# Patient Record
Sex: FEMALE | Race: WHITE | NOT HISPANIC OR LATINO | Employment: OTHER | URBAN - METROPOLITAN AREA
[De-identification: names, ages, dates, MRNs, and addresses within clinical notes are randomized per-mention and may not be internally consistent; named-entity substitution may affect disease eponyms.]

---

## 2017-01-10 ENCOUNTER — HOSPITAL ENCOUNTER (INPATIENT)
Facility: HOSPITAL | Age: 82
LOS: 3 days | Discharge: NURSING, REHAB OR LONG-TERM CARE | End: 2017-01-13
Attending: EMERGENCY MEDICINE | Admitting: INTERNAL MEDICINE

## 2017-01-10 ENCOUNTER — APPOINTMENT (OUTPATIENT)
Dept: GENERAL RADIOLOGY | Facility: HOSPITAL | Age: 82
End: 2017-01-10

## 2017-01-10 DIAGNOSIS — A41.9 SEPSIS, DUE TO UNSPECIFIED ORGANISM: ICD-10-CM

## 2017-01-10 DIAGNOSIS — N39.0 ACUTE UTI: Primary | ICD-10-CM

## 2017-01-10 PROBLEM — D72.829 LEUKOCYTOSIS: Status: ACTIVE | Noted: 2017-01-10

## 2017-01-10 PROBLEM — K21.9 GERD (GASTROESOPHAGEAL REFLUX DISEASE): Status: ACTIVE | Noted: 2017-01-10

## 2017-01-10 PROBLEM — I95.9 HYPOTENSION: Status: ACTIVE | Noted: 2017-01-10

## 2017-01-10 PROBLEM — I48.20 CHRONIC A-FIB (HCC): Status: ACTIVE | Noted: 2017-01-10

## 2017-01-10 PROBLEM — J96.00 ACUTE RESPIRATORY FAILURE (HCC): Status: ACTIVE | Noted: 2017-01-10

## 2017-01-10 LAB
ALBUMIN SERPL-MCNC: 3.3 G/DL (ref 3.2–4.8)
ALBUMIN/GLOB SERPL: 1.4 G/DL (ref 1.5–2.5)
ALP SERPL-CCNC: 151 U/L (ref 25–100)
ALT SERPL W P-5'-P-CCNC: 24 U/L (ref 7–40)
ANION GAP SERPL CALCULATED.3IONS-SCNC: 11 MMOL/L (ref 3–11)
AST SERPL-CCNC: 31 U/L (ref 0–33)
BACTERIA UR QL AUTO: ABNORMAL /HPF
BASOPHILS # BLD AUTO: 0.04 10*3/MM3 (ref 0–0.2)
BASOPHILS NFR BLD AUTO: 0.1 % (ref 0–1)
BILIRUB SERPL-MCNC: 0.4 MG/DL (ref 0.3–1.2)
BILIRUB UR QL STRIP: NEGATIVE
BUN BLD-MCNC: 26 MG/DL (ref 9–23)
BUN/CREAT SERPL: 32.5 (ref 7–25)
CALCIUM SPEC-SCNC: 8.3 MG/DL (ref 8.7–10.4)
CHLORIDE SERPL-SCNC: 98 MMOL/L (ref 99–109)
CLARITY UR: CLEAR
CO2 SERPL-SCNC: 27 MMOL/L (ref 20–31)
COLOR UR: YELLOW
CREAT BLD-MCNC: 0.8 MG/DL (ref 0.6–1.3)
D-LACTATE SERPL-SCNC: 1.3 MMOL/L (ref 0.5–2)
DEPRECATED RDW RBC AUTO: 60.4 FL (ref 37–54)
EOSINOPHIL # BLD AUTO: 0.01 10*3/MM3 (ref 0.1–0.3)
EOSINOPHIL NFR BLD AUTO: 0 % (ref 0–3)
ERYTHROCYTE [DISTWIDTH] IN BLOOD BY AUTOMATED COUNT: 20.1 % (ref 11.3–14.5)
GFR SERPL CREATININE-BSD FRML MDRD: 69 ML/MIN/1.73
GLOBULIN UR ELPH-MCNC: 2.4 GM/DL
GLUCOSE BLD-MCNC: 97 MG/DL (ref 70–100)
GLUCOSE UR STRIP-MCNC: NEGATIVE MG/DL
HCT VFR BLD AUTO: 32.1 % (ref 34.5–44)
HGB BLD-MCNC: 10.1 G/DL (ref 11.5–15.5)
HGB UR QL STRIP.AUTO: NEGATIVE
HYALINE CASTS UR QL AUTO: ABNORMAL /LPF
IMM GRANULOCYTES # BLD: 0.16 10*3/MM3 (ref 0–0.03)
IMM GRANULOCYTES NFR BLD: 0.5 % (ref 0–0.6)
INR PPP: 1.2
KETONES UR QL STRIP: NEGATIVE
LEUKOCYTE ESTERASE UR QL STRIP.AUTO: ABNORMAL
LYMPHOCYTES # BLD AUTO: 0.9 10*3/MM3 (ref 0.6–4.8)
LYMPHOCYTES NFR BLD AUTO: 3.1 % (ref 24–44)
MCH RBC QN AUTO: 25.5 PG (ref 27–31)
MCHC RBC AUTO-ENTMCNC: 31.5 G/DL (ref 32–36)
MCV RBC AUTO: 81.1 FL (ref 80–99)
MONOCYTES # BLD AUTO: 1.39 10*3/MM3 (ref 0–1)
MONOCYTES NFR BLD AUTO: 4.7 % (ref 0–12)
NEUTROPHILS # BLD AUTO: 26.82 10*3/MM3 (ref 1.5–8.3)
NEUTROPHILS NFR BLD AUTO: 91.6 % (ref 41–71)
NITRITE UR QL STRIP: NEGATIVE
PH UR STRIP.AUTO: 6 [PH] (ref 5–8)
PLATELET # BLD AUTO: 284 10*3/MM3 (ref 150–450)
PMV BLD AUTO: 10.4 FL (ref 6–12)
POTASSIUM BLD-SCNC: 4.5 MMOL/L (ref 3.5–5.5)
PROCALCITONIN SERPL-MCNC: 2.86 NG/ML
PROT SERPL-MCNC: 5.7 G/DL (ref 5.7–8.2)
PROT UR QL STRIP: NEGATIVE
PROTHROMBIN TIME: 13.2 SECONDS (ref 9.6–11.5)
RBC # BLD AUTO: 3.96 10*6/MM3 (ref 3.89–5.14)
RBC # UR: ABNORMAL /HPF
REF LAB TEST METHOD: ABNORMAL
SODIUM BLD-SCNC: 136 MMOL/L (ref 132–146)
SP GR UR STRIP: 1.01 (ref 1–1.03)
SQUAMOUS #/AREA URNS HPF: ABNORMAL /HPF
UROBILINOGEN UR QL STRIP: ABNORMAL
WBC NRBC COR # BLD: 29.32 10*3/MM3 (ref 3.5–10.8)
WBC UR QL AUTO: ABNORMAL /HPF
YEAST URNS QL MICRO: ABNORMAL /HPF

## 2017-01-10 PROCEDURE — 83880 ASSAY OF NATRIURETIC PEPTIDE: CPT | Performed by: INTERNAL MEDICINE

## 2017-01-10 PROCEDURE — 87106 FUNGI IDENTIFICATION YEAST: CPT | Performed by: EMERGENCY MEDICINE

## 2017-01-10 PROCEDURE — 99223 1ST HOSP IP/OBS HIGH 75: CPT | Performed by: INTERNAL MEDICINE

## 2017-01-10 PROCEDURE — 99285 EMERGENCY DEPT VISIT HI MDM: CPT

## 2017-01-10 PROCEDURE — 71020 HC CHEST PA AND LATERAL: CPT

## 2017-01-10 PROCEDURE — 25010000002 PIPERACILLIN SOD-TAZOBACTAM PER 1 G: Performed by: EMERGENCY MEDICINE

## 2017-01-10 PROCEDURE — 83605 ASSAY OF LACTIC ACID: CPT | Performed by: EMERGENCY MEDICINE

## 2017-01-10 PROCEDURE — 87086 URINE CULTURE/COLONY COUNT: CPT | Performed by: EMERGENCY MEDICINE

## 2017-01-10 PROCEDURE — 87040 BLOOD CULTURE FOR BACTERIA: CPT | Performed by: EMERGENCY MEDICINE

## 2017-01-10 PROCEDURE — 84145 PROCALCITONIN (PCT): CPT | Performed by: EMERGENCY MEDICINE

## 2017-01-10 PROCEDURE — 80053 COMPREHEN METABOLIC PANEL: CPT | Performed by: EMERGENCY MEDICINE

## 2017-01-10 PROCEDURE — 85025 COMPLETE CBC W/AUTO DIFF WBC: CPT | Performed by: EMERGENCY MEDICINE

## 2017-01-10 PROCEDURE — 81001 URINALYSIS AUTO W/SCOPE: CPT | Performed by: EMERGENCY MEDICINE

## 2017-01-10 PROCEDURE — 85610 PROTHROMBIN TIME: CPT | Performed by: EMERGENCY MEDICINE

## 2017-01-10 PROCEDURE — 25010000002 VANCOMYCIN HCL IN NACL 1.5-0.9 GM/500ML-% SOLUTION: Performed by: EMERGENCY MEDICINE

## 2017-01-10 RX ORDER — FLUCONAZOLE 150 MG/1
150 TABLET ORAL
Status: DISCONTINUED | OUTPATIENT
Start: 2017-01-10 | End: 2017-01-13 | Stop reason: HOSPADM

## 2017-01-10 RX ORDER — ONDANSETRON 2 MG/ML
4 INJECTION INTRAMUSCULAR; INTRAVENOUS EVERY 6 HOURS PRN
Status: DISCONTINUED | OUTPATIENT
Start: 2017-01-10 | End: 2017-01-13 | Stop reason: HOSPADM

## 2017-01-10 RX ORDER — ARGININE/ASCORBATE SOD/VITE AC 4.5 G/9.2G
1 POWDER IN PACKET (EA) ORAL 2 TIMES DAILY
COMMUNITY
End: 2017-04-05 | Stop reason: HOSPADM

## 2017-01-10 RX ORDER — WARFARIN SODIUM 5 MG/1
5 TABLET ORAL
COMMUNITY
End: 2017-01-10

## 2017-01-10 RX ORDER — HYDROCODONE BITARTRATE AND ACETAMINOPHEN 7.5; 325 MG/1; MG/1
1 TABLET ORAL EVERY 6 HOURS PRN
Status: DISCONTINUED | OUTPATIENT
Start: 2017-01-10 | End: 2017-01-13 | Stop reason: HOSPADM

## 2017-01-10 RX ORDER — SODIUM CHLORIDE 9 MG/ML
100 INJECTION, SOLUTION INTRAVENOUS CONTINUOUS
Status: DISCONTINUED | OUTPATIENT
Start: 2017-01-10 | End: 2017-01-11

## 2017-01-10 RX ORDER — CEPHALEXIN 500 MG/1
500 CAPSULE ORAL 2 TIMES DAILY
COMMUNITY
End: 2017-01-10

## 2017-01-10 RX ORDER — LEVOTHYROXINE SODIUM 0.12 MG/1
125 TABLET ORAL DAILY
Status: ON HOLD | COMMUNITY
End: 2017-03-22

## 2017-01-10 RX ORDER — VANCOMYCIN HYDROCHLORIDE 1 G/200ML
15 INJECTION, SOLUTION INTRAVENOUS EVERY 24 HOURS
Status: DISCONTINUED | OUTPATIENT
Start: 2017-01-11 | End: 2017-01-11 | Stop reason: DRUGHIGH

## 2017-01-10 RX ORDER — POLYETHYLENE GLYCOL 3350 17 G/17G
17 POWDER, FOR SOLUTION ORAL EVERY OTHER DAY
COMMUNITY

## 2017-01-10 RX ORDER — ACETAMINOPHEN 325 MG/1
650 TABLET ORAL EVERY 4 HOURS PRN
Status: DISCONTINUED | OUTPATIENT
Start: 2017-01-10 | End: 2017-01-13 | Stop reason: HOSPADM

## 2017-01-10 RX ORDER — SODIUM CHLORIDE 0.9 % (FLUSH) 0.9 %
10 SYRINGE (ML) INJECTION AS NEEDED
Status: DISCONTINUED | OUTPATIENT
Start: 2017-01-10 | End: 2017-01-13 | Stop reason: HOSPADM

## 2017-01-10 RX ORDER — VANCOMYCIN/0.9 % SOD CHLORIDE 1.5G/250ML
20 PLASTIC BAG, INJECTION (ML) INTRAVENOUS ONCE
Status: COMPLETED | OUTPATIENT
Start: 2017-01-10 | End: 2017-01-10

## 2017-01-10 RX ORDER — SODIUM CHLORIDE 9 MG/ML
125 INJECTION, SOLUTION INTRAVENOUS CONTINUOUS
Status: DISCONTINUED | OUTPATIENT
Start: 2017-01-10 | End: 2017-01-11

## 2017-01-10 RX ADMIN — FLUCONAZOLE 150 MG: 150 TABLET ORAL at 22:50

## 2017-01-10 RX ADMIN — SODIUM CHLORIDE 100 ML/HR: 9 INJECTION, SOLUTION INTRAVENOUS at 22:50

## 2017-01-10 RX ADMIN — SODIUM CHLORIDE 125 ML/HR: 9 INJECTION, SOLUTION INTRAVENOUS at 20:27

## 2017-01-10 RX ADMIN — TAZOBACTAM SODIUM AND PIPERACILLIN SODIUM 4.5 G: 500; 4 INJECTION, SOLUTION INTRAVENOUS at 19:52

## 2017-01-10 RX ADMIN — HYDROCODONE BITARTRATE AND ACETAMINOPHEN 1 TABLET: 7.5; 325 TABLET ORAL at 18:51

## 2017-01-10 RX ADMIN — SODIUM CHLORIDE 1000 ML: 9 INJECTION, SOLUTION INTRAVENOUS at 18:34

## 2017-01-10 RX ADMIN — VANCOMYCIN HYDROCHLORIDE 1500 MG: 1 INJECTION, POWDER, LYOPHILIZED, FOR SOLUTION INTRAVENOUS at 20:26

## 2017-01-10 NOTE — Clinical Note
Level of Care: Telemetry [5]   Diagnosis: Acute UTI [[id number]]   Admitting Physician: BERHANE DIGGS [[id number]]   Attending Physician: BERHANE DIGGS [[id number]]

## 2017-01-10 NOTE — IP AVS SNAPSHOT
INTER-FACILITY TRANSFER   AFTER VISIT SUMMARY             Neida Burk            Summary of Your Hospitalization        About Your Hospitalization     You were admitted on:  January 10, 2017 You last received care in the:  [address]      Reason for Hospitalization     Your primary diagnosis was:  Infection In Bloodstream    Your diagnoses also included:  Urinary Tract Infection, Low Blood Pressure, Elevated White Blood Cell Count, Atrial Fibrillation (Irregular Heartbeat), Acid Reflux Disease, Respiratory Insufficiency, Aspiration Pneumonia      Care Providers     Provider Service Role Specialty    Charlotte WILDE MD Medicine Attending Provider Hospitalist    Alban Villatoro MD Infectious Disease Consulting Physician  Infectious Diseases       Your Allergies  Date Reviewed: 1/10/2017    Allergen Reactions    Codeine Nausea And Vomiting         Lortab (Hydrocodone-Acetaminophen) Nausea And Vomiting      Pending Labs     Order Current Status    Blood Culture Preliminary result    Blood Culture Preliminary result       Medications    Based on the information you provided to us as well as any changes during this visit, the following is your updated medication list.  This is subject to change based on the care your receive at the receiving facility.  You should receive a new list once you are discharged.      If you have any questions or concerns, contact your primary care physician's office.             Your Medications      START taking these medications     amoxicillin-clavulanate 875-125 MG per tablet   Take 1 tablet by mouth Every 12 (Twelve) Hours for 4 days. Indications: Pneumonia   Commonly known as:  AUGMENTIN           fluconazole 150 MG tablet   Take 1 tablet by mouth Daily for 4 days. Indications: Infection of Blood or Tissues affecting the Whole Body, Urinary Tract Infection   Last time this was given:  1/12/2017  9:19 PM   Commonly known as:  DIFLUCAN             CONTINUE  taking these medications     ARGINAID pack   Take 1 packet by mouth 2 (Two) Times a Day.           aspirin 81 MG chewable tablet   Chew 81 mg Daily.   Last time this was given:  1/13/2017  9:23 AM           atorvastatin 20 MG tablet   Take 20 mg by mouth Every Night.   Last time this was given:  1/12/2017  9:19 PM   Commonly known as:  LIPITOR           baclofen 10 MG tablet   Take 10 mg by mouth Every Night.   Last time this was given:  1/12/2017  9:19 PM   Commonly known as:  LIORESAL           CENTRUM SILVER ULTRA WOMENS PO   Take 1 tablet by mouth Daily.   Last time this was given:  1/13/2017  9:24 AM           cholecalciferol 1000 UNITS tablet   Take 1,000 Units by mouth Daily.   Last time this was given:  1/13/2017 12:08 PM   Commonly known as:  VITAMIN D3           cyclobenzaprine 10 MG tablet   Take 10 mg by mouth 3 (Three) Times a Day As Needed for muscle spasms.   Last time this was given:  1/13/2017 12:08 PM   Commonly known as:  FLEXERIL           DULoxetine 60 MG capsule   Take 60 mg by mouth Daily.   Last time this was given:  1/13/2017 12:08 PM   Commonly known as:  CYMBALTA           folic acid 1 MG tablet   Take 1 mg by mouth Daily. Except on day take methotrexate   Last time this was given:  1/13/2017  9:25 AM   Commonly known as:  FOLVITE           furosemide 40 MG tablet   Take 40 mg by mouth Daily.   Commonly known as:  LASIX           gabapentin 600 MG tablet   Take 600 mg by mouth Every Night.   Commonly known as:  NEURONTIN           HYDROcodone-acetaminophen 7.5-325 MG per tablet   Take 1 tablet by mouth Every 6 (Six) Hours As Needed for moderate pain (4-6).   Last time this was given:  1/13/2017 10:27 AM   Commonly known as:  NORCO           hydroxychloroquine 200 MG tablet   Take 200 mg by mouth Daily.   Last time this was given:  1/13/2017 12:08 PM   Commonly known as:  PLAQUENIL           levothyroxine 125 MCG tablet   Take 125 mcg by mouth Daily.   Last time this was given:  1/13/2017   6:00 AM   Commonly known as:  SYNTHROID, LEVOTHROID           pantoprazole 40 MG EC tablet   Take 40 mg by mouth Daily.   Last time this was given:  1/13/2017  9:25 AM   Commonly known as:  PROTONIX           polyethylene glycol packet   Take 17 g by mouth Every Other Day.   Commonly known as:  MIRALAX           potassium chloride 10 MEQ CR tablet   Take 10 mEq by mouth Daily.   Commonly known as:  K-DUR           pramipexole 1 MG tablet   Take 1 mg by mouth Daily.   Last time this was given:  1/12/2017  9:19 PM   Commonly known as:  MIRAPEX                Where to Get Your Medications      Information about where to get these medications is not yet available     ! Ask your nurse or doctor about these medications     amoxicillin-clavulanate 875-125 MG per tablet    fluconazole 150 MG tablet                Additional Instructions    Information is subject to change based on the care your receive at the receiving facility.  If you have any questions or concerns, contact your primary care physician's office.          Activity Instructions     Activity as Tolerated                 Diet Instructions     Advance Diet As Tolerated                  Follow-ups for After Discharge        Follow-up Information     Follow up with Sergio Garcia MD Follow up on 1/23/2017.    Specialty:  Internal Medicine    Why:  Follow up with Dr. Garcia 1/23/17 at 11:30am.     Contact information:    [address] [place] [id number] [phone]        Referrals and Follow-ups to Schedule     Follow-Up    As directed    PCP in 1 week             Information on Stroke     You have been diagnosed with stroke during your hospitalization. Review the following to reduce your risk of stroke:  Discharge Medications  Taking your medications as prescribed is one of the most vital aspects of reducing your risk for stroke. It is important to know the names of your medications, how they work, how much to take, and when to take them. You  should take your medications at the same time every day. Do not stop your prescribed medications or begin taking over-the-counter or herbal medications without first speaking with your physician.  Risk Factors for Stroke  High blood pressure - High blood pressure is the most important risk factor for stroke. People who have high blood pressure have more than half the lifetime risk of having stroke compared to those who consistently have an optimal blood pressure reading of 120/80.  Tobacco Use - Tobacco use doubles the risk for another stroke. Stop smoking if you smoke  High cholesterol - Cholesterol or plaque build-up in the arteries can block normal blood flow to the brain and cause a stroke and increase risk of heart disease. Maintain healthy cholesterol levels  Diabetes - People with diabetes are up to 4 times as likely to have a stroke as someone who does not have the disease.   Atrial fibrillation - Atrial fibrillation increases your stroke risk 5 times, so it’s important to work with a doctor to control it. Eat a healthy diet -- maintaining a diet low in calories, saturated and trans fats and cholesterol helps manage both obesity and healthy cholesterol levels in the blood, which also reduces risk for stroke.   Physical activity - Physical activity reduces stroke risk. A recent study showed that people who exercise five or more times per week are less likely to have another stroke. Increase your physical activity.  Alcohol use - Some studies say that drinking more than 2 drinks per day may increase stroke risk by 50 percent. Other studies have indicated that one alcoholic beverage a day may lower a person’s risk for stroke, provided that there is no other medical reason for avoiding alcohol. Talk with a doctor about alcohol use and how it can best be controlled to prevent another stroke.    Warning Signs of Stroke  Use FAST to remember warning signs of stroke:  Face - Ask the person to smile. Does one side of  the face droop?  Arms - Ask the person to raise both arms. Does one arm drift downward?  Speech - Ask the person to repeat a simple phrase. Is their speech slurred or strange?  Time - If you observe any of these signs, call 9-1-1 immediately.  Symptoms of Stroke  Sudden numbness or weakness of face, arm or leg - especially on one side of the body.   Sudden confusion, trouble speaking or understanding.   Sudden trouble seeing in one or both eyes.   Sudden trouble walking, dizziness, loss of balance or coordination.   Sudden severe headache with no known cause.                     Patient Signature:  ____________________________________________________________    Date:  ____________________________________________________________        More Information      Urinary Tract Infection  Urinary tract infections (UTIs) can develop anywhere along your urinary tract. Your urinary tract is your body's drainage system for removing wastes and extra water. Your urinary tract includes two kidneys, two ureters, a bladder, and a urethra. Your kidneys are a pair of bean-shaped organs. Each kidney is about the size of your fist. They are located below your ribs, one on each side of your spine.  CAUSES  Infections are caused by microbes, which are microscopic organisms, including fungi, viruses, and bacteria. These organisms are so small that they can only be seen through a microscope. Bacteria are the microbes that most commonly cause UTIs.  SYMPTOMS   Symptoms of UTIs may vary by age and gender of the patient and by the location of the infection. Symptoms in young women typically include a frequent and intense urge to urinate and a painful, burning feeling in the bladder or urethra during urination. Older women and men are more likely to be tired, shaky, and weak and have muscle aches and abdominal pain. A fever may mean the infection is in your kidneys. Other symptoms of a kidney infection include pain in your back or sides below  the ribs, nausea, and vomiting.  DIAGNOSIS  To diagnose a UTI, your caregiver will ask you about your symptoms. Your caregiver will also ask you to provide a urine sample. The urine sample will be tested for bacteria and white blood cells. White blood cells are made by your body to help fight infection.  TREATMENT   Typically, UTIs can be treated with medication. Because most UTIs are caused by a bacterial infection, they usually can be treated with the use of antibiotics. The choice of antibiotic and length of treatment depend on your symptoms and the type of bacteria causing your infection.  HOME CARE INSTRUCTIONS  · If you were prescribed antibiotics, take them exactly as your caregiver instructs you. Finish the medication even if you feel better after you have only taken some of the medication.  · Drink enough water and fluids to keep your urine clear or pale yellow.  · Avoid caffeine, tea, and carbonated beverages. They tend to irritate your bladder.  · Empty your bladder often. Avoid holding urine for long periods of time.  · Empty your bladder before and after sexual intercourse.  · After a bowel movement, women should cleanse from front to back. Use each tissue only once.  SEEK MEDICAL CARE IF:   · You have back pain.  · You develop a fever.  · Your symptoms do not begin to resolve within 3 days.  SEEK IMMEDIATE MEDICAL CARE IF:   · You have severe back pain or lower abdominal pain.  · You develop chills.  · You have nausea or vomiting.  · You have continued burning or discomfort with urination.  MAKE SURE YOU:   · Understand these instructions.  · Will watch your condition.  · Will get help right away if you are not doing well or get worse.     This information is not intended to replace advice given to you by your health care provider. Make sure you discuss any questions you have with your health care provider.     Document Released: 09/27/2006 Document Revised: 09/07/2016 Document Reviewed:  01/25/2013  [employer] Patient Education ©2016 [employer]          Amoxicillin; Clavulanic Acid tablets  What is this medicine?  AMOXICILLIN; CLAVULANIC ACID (a mox i LAURENCE in; TRINHCLARE moriah misty mccabe AS id) is a penicillin antibiotic. It is used to treat certain kinds of bacterial infections. It will not work for colds, flu, or other viral infections.  This medicine may be used for other purposes; ask your health care provider or pharmacist if you have questions.  What should I tell my health care provider before I take this medicine?  They need to know if you have any of these conditions:  -bowel disease, like colitis  -kidney disease  -liver disease  -mononucleosis  -an unusual or allergic reaction to amoxicillin, penicillin, cephalosporin, other antibiotics, clavulanic acid, other medicines, foods, dyes, or preservatives  -pregnant or trying to get pregnant  -breast-feeding  How should I use this medicine?  Take this medicine by mouth with a full glass of water. Follow the directions on the prescription label. Take at the start of a meal. Do not crush or chew. If the tablet has a score line, you may cut it in half at the score line for easier swallowing. Take your medicine at regular intervals. Do not take your medicine more often than directed. Take all of your medicine as directed even if you think you are better. Do not skip doses or stop your medicine early.  Talk to your pediatrician regarding the use of this medicine in children. Special care may be needed.  Overdosage: If you think you have taken too much of this medicine contact a poison control center or emergency room at once.  NOTE: This medicine is only for you. Do not share this medicine with others.  What if I miss a dose?  If you miss a dose, take it as soon as you can. If it is almost time for your next dose, take only that dose. Do not take double or extra doses.  What may interact with this medicine?  -allopurinol  -anticoagulants  -birth  control pills  -methotrexate  -probenecid  This list may not describe all possible interactions. Give your health care provider a list of all the medicines, herbs, non-prescription drugs, or dietary supplements you use. Also tell them if you smoke, drink alcohol, or use illegal drugs. Some items may interact with your medicine.  What should I watch for while using this medicine?  Tell your doctor or health care professional if your symptoms do not improve.  Do not treat diarrhea with over the counter products. Contact your doctor if you have diarrhea that lasts more than 2 days or if it is severe and watery.  If you have diabetes, you may get a false-positive result for sugar in your urine. Check with your doctor or health care professional.  Birth control pills may not work properly while you are taking this medicine. Talk to your doctor about using an extra method of birth control.  What side effects may I notice from receiving this medicine?  Side effects that you should report to your doctor or health care professional as soon as possible:  -allergic reactions like skin rash, itching or hives, swelling of the face, lips, or tongue  -breathing problems  -dark urine  -fever or chills, sore throat  -redness, blistering, peeling or loosening of the skin, including inside the mouth  -seizures  -trouble passing urine or change in the amount of urine  -unusual bleeding, bruising  -unusually weak or tired  -white patches or sores in the mouth or throat  Side effects that usually do not require medical attention (report to your doctor or health care professional if they continue or are bothersome):  -diarrhea  -dizziness  -headache  -nausea, vomiting  -stomach upset  -vaginal or anal irritation  This list may not describe all possible side effects. Call your doctor for medical advice about side effects. You may report side effects to FDA at 3-507-FDA-5478.  Where should I keep my medicine?  Keep out of the reach of  children.  Store at room temperature below 25 degrees C (77 degrees F). Keep container tightly closed. Throw away any unused medicine after the expiration date.  NOTE: This sheet is a summary. It may not cover all possible information. If you have questions about this medicine, talk to your doctor, pharmacist, or health care provider.     © 2016, [employer]. (2009-03-12 12:04:30)          Fluconazole tablets  What is this medicine?  FLUCONAZOLE (floo HEMA na zole) is an antifungal medicine. It is used to treat certain kinds of fungal or yeast infections.  This medicine may be used for other purposes; ask your health care provider or pharmacist if you have questions.  What should I tell my health care provider before I take this medicine?  They need to know if you have any of these conditions:  -electrolyte abnormalities  -history of irregular heart beat  -kidney disease  -an unusual or allergic reaction to fluconazole, other azole antifungals, medicines, foods, dyes, or preservatives  -pregnant or trying to get pregnant  -breast-feeding  How should I use this medicine?  Take this medicine by mouth. Follow the directions on the prescription label. Do not take your medicine more often than directed.  Talk to your pediatrician regarding the use of this medicine in children. Special care may be needed. This medicine has been used in children as young as 6 months of age.  Overdosage: If you think you have taken too much of this medicine contact a poison control center or emergency room at once.  NOTE: This medicine is only for you. Do not share this medicine with others.  What if I miss a dose?  If you miss a dose, take it as soon as you can. If it is almost time for your next dose, take only that dose. Do not take double or extra doses.  What may interact with this medicine?  Do not take this medicine with any of the following medications:  -astemizole  -certain medicines for irregular heart beat like  dofetilide, dronedarone, quinidine  -cisapride  -erythromycin  -lomitapide  -other medicines that prolong the QT interval (cause an abnormal heart rhythm)  -pimozide  -terfenadine  -thioridazine  -tolvaptan  -ziprasidone  This medicine may also interact with the following medications:  -antiviral medicines for HIV or AIDS  -birth control pills  -certain antibiotics like rifabutin, rifampin  -certain medicines for blood pressure like amlodipine, isradipine, felodipine, hydrochlorothiazide, losartan, nifedipine  -certain medicines for cancer like cyclophosphamide, vinblastine, vincristine  -certain medicines for cholesterol like atorvastatin, lovastatin, fluvastatin, simvastatin  -certain medicines for depression, anxiety, or psychotic disturbances like amitriptyline, midazolam, nortriptyline, triazolam  -certain medicines for diabetes like glipizide, glyburide, tolbutamide  -certain medicines for pain like alfentanil, fentanyl, methadone  -certain medicines for seizures like carbamazepine, phenytoin  -certain medicines that treat or prevent blood clots like warfarin  -halofantrine  -medicines that lower your chance of fighting infection like cyclosporine, prednisone, tacrolimus  -NSAIDS, medicines for pain and inflammation, like celecoxib, diclofenac, flurbiprofen, ibuprofen, meloxicam, naproxen  -other medicines for fungal infections  -sirolimus  -theophylline  -tofacitinib  This list may not describe all possible interactions. Give your health care provider a list of all the medicines, herbs, non-prescription drugs, or dietary supplements you use. Also tell them if you smoke, drink alcohol, or use illegal drugs. Some items may interact with your medicine.  What should I watch for while using this medicine?  Visit your doctor or health care professional for regular checkups. If you are taking this medicine for a long time you may need blood work. Tell your doctor if your symptoms do not improve. Some fungal  infections need many weeks or months of treatment to cure.  Alcohol can increase possible damage to your liver. Avoid alcoholic drinks.  If you have a vaginal infection, do not have sex until you have finished your treatment. You can wear a sanitary napkin. Do not use tampons. Wear freshly washed cotton, not synthetic, panties.  What side effects may I notice from receiving this medicine?  Side effects that you should report to your doctor or health care professional as soon as possible:  -allergic reactions like skin rash or itching, hives, swelling of the lips, mouth, tongue, or throat  -dark urine  -feeling dizzy or faint  -irregular heartbeat or chest pain  -redness, blistering, peeling or loosening of the skin, including inside the mouth  -trouble breathing  -unusual bruising or bleeding  -vomiting  -yellowing of the eyes or skin  Side effects that usually do not require medical attention (report to your doctor or health care professional if they continue or are bothersome):  -changes in how food tastes  -diarrhea  -headache  -stomach upset or nausea  This list may not describe all possible side effects. Call your doctor for medical advice about side effects. You may report side effects to FDA at 8-033-FDA-0388.  Where should I keep my medicine?  Keep out of the reach of children.  Store at room temperature below 30 degrees C (86 degrees F). Throw away any medicine after the expiration date.  NOTE: This sheet is a summary. It may not cover all possible information. If you have questions about this medicine, talk to your doctor, pharmacist, or health care provider.     © 2016, [employer]. (2014-07-26 16:13:04)

## 2017-01-10 NOTE — ED PROVIDER NOTES
Subjective   HPI Comments: Jonathan Burk is a 83 y.o.female who presents to the ED with c/o hypotension. She was sent to the ED today by her nursing home. She has no physical complaints but by report has hypotension, leukocytosis, and fever. Upon ED arrival, she states she had cold symptoms this morning which have resolved but denies SOA, CP, abdominal pain, or any other complaints at this time. She notes she has chronic neck pain secondary to arthritis.    Patient is a 83 y.o. female presenting with general illness.   History provided by:  Patient and nursing home  Illness   Location:  General  Quality:  Hypotension  Severity:  Moderate  Onset quality:  Sudden  Timing:  Constant  Progression:  Unchanged  Chronicity:  New  Context:  She was sent to the ED today by her nursing home. She has no physical complaints but by report has hypotension, leukocytosis, and fever. Upon ED arrival, she states she had cold symptoms this morning which have resolved but denies SOA, CP, abdominal pain, or any other complaints at this time.   Relieved by:  None tried  Worsened by:  Nothing  Ineffective treatments:  None tried  Associated symptoms: fever    Associated symptoms: no abdominal pain, no chest pain, no cough, no diarrhea, no headaches, no loss of consciousness, no nausea, no shortness of breath, no sore throat and no vomiting        Review of Systems   Constitutional: Positive for fever. Negative for chills.        Hypotension and leukocytosis.    HENT: Positive for sneezing. Negative for sore throat.         Cold symptoms.    Respiratory: Negative for cough and shortness of breath.    Cardiovascular: Negative for chest pain.   Gastrointestinal: Negative for abdominal pain, diarrhea, nausea and vomiting.   Musculoskeletal: Negative for back pain and neck pain.   Neurological: Negative for dizziness, loss of consciousness, weakness, light-headedness and headaches.   All other systems reviewed and are negative.      Past  Medical History   Diagnosis Date   • Back pain    • Chronic a-fib    • CVA (cerebral vascular accident)      about 2-3 years ago, residual is slight gait problems   • Gastrointestinal hemorrhage    • GERD (gastroesophageal reflux disease)    • Guillain Barré syndrome    • Hypothyroid    • Malignant melanoma    • Melanoma    • MRSA (methicillin resistant Staphylococcus aureus)    • Ulcer of esophagus        Allergies   Allergen Reactions   • Codeine Nausea And Vomiting   • Lortab [Hydrocodone-Acetaminophen] Nausea And Vomiting       Past Surgical History   Procedure Laterality Date   • Hip arthroplasty Right      Had complications with bone fx and anisha, 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 and wears shoes with elevated soles   • Back surgery       x3    • Elbow procedure     • Knee surgery     • Skin cancer excision     • Endoscopy N/A 12/3/2016     Procedure: ESOPHAGOGASTRODUODENOSCOPY AT BEDSIDE;  Surgeon: Chandler aMher MD;  Location: [facility] ENDOSCOPY;  Service:        Family History   Problem Relation Age of Onset   • Aneurysm Mother    • Stroke Mother    • No Known Problems Father        Social History     Social History   • Marital status:      Spouse name: N/A   • Number of children: N/A   • Years of education: N/A     Social History Main Topics   • Smoking status: Former Smoker   • Smokeless tobacco: Never Used   • Alcohol use 0.6 oz/week     1 Glasses of wine per week   • Drug use: No   • Sexual activity: Defer     Other Topics Concern   • None     Social History Narrative   • None         Objective   Physical Exam   Constitutional: She is oriented to person, place, and time. She appears well-developed and well-nourished. No distress.   HENT:   Head: Normocephalic and atraumatic.   Eyes: Conjunctivae are normal. No scleral icterus.   Neck: Normal range of motion. Neck supple.   Cardiovascular: Normal rate, regular rhythm and normal heart sounds.    Pulmonary/Chest: Effort normal and breath  sounds normal. No respiratory distress.   Abdominal: Soft. Bowel sounds are normal. There is no tenderness.   Musculoskeletal: Normal range of motion. She exhibits no edema.   Neurological: She is alert and oriented to person, place, and time.   Skin: Skin is warm and dry.   Psychiatric: She has a normal mood and affect. Her behavior is normal.   Nursing note and vitals reviewed.      Procedures         ED Course  ED Course   Hypotensive in ED, responded well to IV fluids.  Empiric abx for sepsis ordered.  Urine results were delayed, finally showed UTI. CXR negative, unsure why she was hypoxic.  Patient stable on serial rechecks.  Discussed exam findings, test results so far and concerns in detail at the bedside.  Discussed need for admission for further evaluation and treatment.                    MDM  Number of Diagnoses or Management Options  Acute UTI:   Sepsis, due to unspecified organism:      Amount and/or Complexity of Data Reviewed  Clinical lab tests: ordered and reviewed  Tests in the radiology section of CPT®: ordered and reviewed  Decide to obtain previous medical records or to obtain history from someone other than the patient: yes  Discuss the patient with other providers: yes  Independent visualization of images, tracings, or specimens: yes        Final diagnoses:   Acute UTI   Sepsis, due to unspecified organism       Documentation assistance provided by blade Stevens.  Information recorded by the scribe was done at my direction and has been verified and validated by me.     Irma Stevens  01/10/17 1720       Francisco Lockwood MD  01/11/17 4425

## 2017-01-10 NOTE — IP AVS SNAPSHOT
LUIS VARGAS   Facility: [facility] ([place])   SA: [facility]    MRN:  [id number]   PT#:     Report:  [id number] - Summary of Care Document           Basic Information     Date Of Birth Sex Race Ethnicity Preferred Language Preferred Written Language    11/23/1933 Female White or  Not  or  English English      Allergies as of 1/13/2017  Reviewed On: 1/10/2017 By: Kristi Chau RN       Noted Reaction Type Reactions    Codeine 10/19/2016    Nausea And Vomiting    Lortab [Hydrocodone-acetaminophen] 10/19/2016    Nausea And Vomiting      Current Medications Are     amoxicillin-clavulanate (AUGMENTIN) 875-125 MG per tablet Take 1 tablet by mouth Every 12 (Twelve) Hours for 4 days. Indications: Pneumonia    aspirin 81 MG chewable tablet Chew 81 mg Daily.    atorvastatin (LIPITOR) 20 MG tablet Take 20 mg by mouth Every Night.    baclofen (LIORESAL) 10 MG tablet Take 10 mg by mouth Every Night.    cholecalciferol (VITAMIN D3) 1000 UNITS tablet Take 1,000 Units by mouth Daily.    cyclobenzaprine (FLEXERIL) 10 MG tablet Take 10 mg by mouth 3 (Three) Times a Day As Needed for muscle spasms.    DULoxetine (CYMBALTA) 60 MG capsule Take 60 mg by mouth Daily.    fluconazole (DIFLUCAN) 150 MG tablet Take 1 tablet by mouth Daily for 4 days. Indications: Infection of Blood or Tissues affecting the Whole Body, Urinary Tract Infection    folic acid (FOLVITE) 1 MG tablet Take 1 mg by mouth Daily. Except on day take methotrexate    furosemide (LASIX) 40 MG tablet Take 40 mg by mouth Daily.    gabapentin (NEURONTIN) 600 MG tablet Take 600 mg by mouth Every Night.    HYDROcodone-acetaminophen (NORCO) 7.5-325 MG per tablet Take 1 tablet by mouth Every 6 (Six) Hours As Needed for moderate pain (4-6).    hydroxychloroquine (PLAQUENIL) 200 MG tablet Take 200 mg by mouth Daily.    levothyroxine (SYNTHROID, LEVOTHROID) 125 MCG tablet Take 125 mcg by mouth Daily.    Multiple  Vitamins-Minerals (CENTRUM SILVER ULTRA WOMENS PO) Take 1 tablet by mouth Daily.    Nutritional Supplements (ARGINAID) pack Take 1 packet by mouth 2 (Two) Times a Day.    pantoprazole (PROTONIX) 40 MG EC tablet Take 40 mg by mouth Daily.    polyethylene glycol (MIRALAX) packet Take 17 g by mouth Every Other Day.    potassium chloride (K-DUR) 10 MEQ CR tablet Take 10 mEq by mouth Daily.    pramipexole (MIRAPEX) 1 MG tablet Take 1 mg by mouth Daily.      Current Immunizations     No immunizations on file.      Problem List as of 1/13/2017  Date Reviewed: 1/13/2017             Codes Priority Class Noted - Resolved    RESOLVED: Upper GI hemorrhage ICD-10-CM: K92.2  ICD-9-CM: 578.9   12/2/2016 - 1/10/2017            RESOLVED: Elevated liver enzymes due to shock liver ICD-10-CM: R74.8  ICD-9-CM: 790.5   12/2/2016 - 1/10/2017    CVA (cerebral vascular accident) ICD-10-CM: I63.9  ICD-9-CM: 434.91   12/2/2016 - Present    Overview Signed 12/2/2016  6:54 PM by VANNA Du     Takes coumadin at home         RESOLVED: Supratherapeutic INR ICD-10-CM: R79.1  ICD-9-CM: 790.92   12/2/2016 - 1/10/2017    RESOLVED: Altered mental state ICD-10-CM: R41.82  ICD-9-CM: 780.97   12/2/2016 - 1/10/2017    Recent Elbow Infection ICD-10-CM: S50.319A, L08.9  ICD-9-CM: 913.1   12/2/2016 - Present    Overview Signed 12/2/2016  7:06 PM by Anne Parker, APRN     Was just taken off doxycycline         RESOLVED: Acute blood loss anemia ICD-10-CM: D62  ICD-9-CM: 285.1   12/2/2016 - 1/10/2017    RESOLVED: Gastrointestinal hemorrhage with melena ICD-10-CM: K92.1  ICD-9-CM: 578.1   12/2/2016 - 1/10/2017    RESOLVED: Lactic acidosis ICD-10-CM: E87.2  ICD-9-CM: 276.2   12/2/2016 - 1/10/2017    RESOLVED: Acute kidney injury ICD-10-CM: N17.9  ICD-9-CM: 584.9   12/2/2016 - 1/10/2017    Ulcerative esophagitis ICD-10-CM: K22.10  ICD-9-CM: 530.19   12/6/2016 - Present    RESOLVED: Hemorrhagic Shock ICD-10-CM: R57.9  ICD-9-CM: 785.50    12/6/2016 - 1/10/2017    RESOLVED: Thrombophlebitis arm, bilateral ICD-10-CM: I80.8  ICD-9-CM: 451.84   12/6/2016 - 1/10/2017    RESOLVED: Troponin I above reference range, likely due to shock ICD-10-CM: R79.89  ICD-9-CM: 790.6   12/6/2016 - 1/10/2017    RESOLVED: Bilateral neck pain ICD-10-CM: M54.2  ICD-9-CM: 723.1   12/6/2016 - 1/10/2017    UTI (urinary tract infection) ICD-10-CM: N39.0  ICD-9-CM: 599.0   1/10/2017 - Present    * (Principal)Sepsis ICD-10-CM: A41.9  ICD-9-CM: 038.9, 995.91   1/10/2017 - Present    Hypotension ICD-10-CM: I95.9  ICD-9-CM: 458.9   1/10/2017 - Present    Leukocytosis ICD-10-CM: D72.829  ICD-9-CM: 288.60   1/10/2017 - Present    Chronic a-fib ICD-10-CM: I48.2  ICD-9-CM: 427.31   1/10/2017 - Present    GERD (gastroesophageal reflux disease) ICD-10-CM: K21.9  ICD-9-CM: 530.81   1/10/2017 - Present    Acute respiratory failure with hypoxia ICD-10-CM: J96.01  ICD-9-CM: 518.81   1/10/2017 - Present    Aspiration pneumonia ICD-10-CM: J69.0  ICD-9-CM: 507.0   1/13/2017 - Present      Diagnoses        Codes Comments    Acute UTI    -  Primary ICD-10-CM: N39.0  ICD-9-CM: 599.0     Sepsis, due to unspecified organism     ICD-10-CM: A41.9  ICD-9-CM: 038.9, 995.91       Lab and Imaging Results     Procedure Component Value Units Date/Time    Urine Culture [[id number]]  (Abnormal) Collected:  01/10/17 1900    Specimen:  Urine from Urine, Catheter Updated:  01/13/17 1324     Urine Culture 70,000-80,000 CFU/mL Candida tropicalis (A)     CBC (No Diff) [[id number]]  (Abnormal) Collected:  01/13/17 0700    Specimen:  Blood Updated:  01/13/17 0752     WBC 7.93 10*3/mm3      RBC 4.01 10*6/mm3      Hemoglobin 9.8 (L) g/dL      Hematocrit 33.1 (L) %      MCV 82.5 fL      MCH 24.4 (L) pg      MCHC 29.6 (L) g/dL      RDW 19.6 (H) %      RDW-SD 60.5 (H) fl      MPV 10.4 fL      Platelets 311 10*3/mm3     Vancomycin, Trough [[id number]]  (Abnormal) Collected:  01/12/17 2028    Specimen:  Blood Updated:  01/12/17 2102      Vancomycin Trough 8.70 (L) mcg/mL     Blood Culture [[id number]]  (Normal) Collected:  01/10/17 1803    Specimen:  Blood from Hand, Right Updated:  01/12/17 1901     Blood Culture No growth at 2 days     Blood Culture [[id number]]  (Normal) Collected:  01/10/17 1804    Specimen:  Blood from Arm, Left Updated:  01/12/17 1901     Blood Culture No growth at 2 days     CT Chest Without Contrast [[id number]] Collected:  01/12/17 1256     Updated:  01/12/17 1447    Narrative:       EXAMINATION: CT CHEST WITHOUT CONTRAST-01/11/2017:      INDICATION: Fever, cough, hypoxia; possible aspiration;; N39.0-Urinary  tract infection, site not specified; A41.9-Sepsis, unspecified organism.          TECHNIQUE:  CT data set of the chest and mediastinum was performed  without intravenous contrast.     The radiation dose reduction device was turned on for each scan per the  ALARA (As Low as Reasonably Achievable) protocol.     COMPARISON: NONE     FINDINGS:   1. Reactive-type lymph nodes are seen in the superior and mid  mediastinum. The cardiac chambers are borderline enlarged without  pericardial effusion. There is a large hiatus hernia with partial  intrathoracic stomach in the lower chest.  2. There are reactive lymph nodes in the axillary areas bilaterally  without bulky mass. Thoracic inlet is otherwise unremarkable. The images  into the upper abdomen are nonrevealing.  3. Extended window data sets demonstrate obstructive lung disease and  patchy areas of substantial fibrotic opacities. Linear nodular  coalescent opacities with mild groundglass are seen in the superior  segment right lower lobe and in the basilar segments bilaterally with  secondary pulmonary fibrosis and substantial fibrotic involvement of the  peripheral secondary pulmonary lobular septations. Trace effusions are  seen at both lung bases and there is bronchiectasis.       Impression:       1.  Cardiomegaly without pericardial effusion.  2.  Marked background  pattern of obstructive lung disease, centrilobular  emphysema and pulmonary fibrosis with involvement of the secondary  pulmonary lobular septations.  3.  Ill defined nodular groundglass opacity posterior segment right  upper lobe and superior segment right lower lobe with mild consolidative  opacities at both bases and trace effusions.  4.  Marked esophagomegaly is noted with fluid in the esophagus and there  is a large hiatus hernia in the lower chest with partial intrathoracic  stomach.     D:  01/12/2017  E:  01/12/2017           This report was finalized on 1/12/2017 2:45 PM by Dr. Chi Talley MD.       Basic Metabolic Panel [[id number]]  (Abnormal) Collected:  01/12/17 0704    Specimen:  Blood Updated:  01/12/17 0808     Glucose 72 mg/dL      BUN 18 mg/dL      Creatinine 0.70 mg/dL      Sodium 138 mmol/L      Potassium 3.8 mmol/L      Chloride 105 mmol/L      CO2 26.0 mmol/L      Calcium 7.9 (L) mg/dL      eGFR Non African Amer 80 mL/min/1.73      BUN/Creatinine Ratio 25.7 (H)      Anion Gap 7.0 mmol/L     Narrative:       National Kid[employer] Guidelines    Stage                           Description                             GFR                      1                               Normal or High                          90+  2                               Mild decrease                            60-89  3                               Moderate decrease                   30-59  4                               Severe decrease                       15-29  5                               Kidney failure                             <15    CBC & Differential [[id number]] Collected:  01/12/17 0704    Specimen:  Blood Updated:  01/12/17 0800    Narrative:       The following orders were created for panel order CBC & Differential.  Procedure                               Abnormality         Status                     ---------                               -----------         ------                     CBC Auto  Differential[[id number]]         Abnormal            Final result                 Please view results for these tests on the individual orders.    CBC Auto Differential [[id number]]  (Abnormal) Collected:  01/12/17 0704    Specimen:  Blood Updated:  01/12/17 0800     WBC 10.79 10*3/mm3      RBC 3.89 10*6/mm3      Hemoglobin 9.7 (L) g/dL      Hematocrit 32.0 (L) %      MCV 82.3 fL      MCH 24.9 (L) pg      MCHC 30.3 (L) g/dL      RDW 20.0 (H) %      RDW-SD 60.7 (H) fl      MPV 9.8 fL      Platelets 271 10*3/mm3      Neutrophil % 80.4 (H) %      Lymphocyte % 7.0 (L) %      Monocyte % 6.0 %      Eosinophil % 6.0 (H) %      Basophil % 0.2 %      Immature Grans % 0.4 %      Neutrophils, Absolute 8.67 (H) 10*3/mm3      Lymphocytes, Absolute 0.76 10*3/mm3      Monocytes, Absolute 0.65 10*3/mm3      Eosinophils, Absolute 0.65 (H) 10*3/mm3      Basophils, Absolute 0.02 10*3/mm3      Immature Grans, Absolute 0.04 (H) 10*3/mm3     Influenza A & B, RT PCR [[id number]]  (Normal) Collected:  01/11/17 2038    Specimen:  Swab from Nasopharynx Updated:  01/11/17 2219     Influenza A PCR Not Detected      Influenza B PCR Not Detected     XR Chest 2 View [[id number]] Collected:  01/11/17 0933     Updated:  01/11/17 1254    Narrative:       EXAMINATION: XR CHEST 2 VIEWS-01/10/2017:      INDICATION: Hypoxia, fever.      COMPARISON: NONE     FINDINGS:   1.  Cardiomegaly is noted. There is COPD and diffuse interstitial  fibrosis with granulomatous scarring.  2.  Superimposed upon these chronic senile changes, there is no evidence  of consolidation, edema or free fluid.  3.  There is marked dorsal arthropathy and there is severe right  shoulder arthropathy.           Impression:       1.  Cardiomegaly is noted with mild central vascular prominence.  Background pattern of significant interstitial and granulomatous  scarring is noted.  2.  Active disease, consolidation, free fluid or edema is otherwise not  identified.     D:  01/10/2017  E:   01/11/2017     This report was finalized on 1/11/2017 12:52 PM by Dr. Chi Talley MD.       Basic Metabolic Panel [[id number]]  (Abnormal) Collected:  01/11/17 0558    Specimen:  Blood Updated:  01/11/17 0848     Glucose 73 mg/dL      BUN 21 mg/dL      Creatinine 0.70 mg/dL      Sodium 136 mmol/L      Potassium 4.0 mmol/L      Chloride 105 mmol/L      CO2 22.0 mmol/L      Calcium 7.6 (L) mg/dL      eGFR Non African Amer 80 mL/min/1.73      BUN/Creatinine Ratio 30.0 (H)      Anion Gap 9.0 mmol/L     Narrative:       National Kidney Foundation Guidelines    Stage                           Description                             GFR                      1                               Normal or High                          90+  2                               Mild decrease                            60-89  3                               Moderate decrease                   30-59  4                               Severe decrease                       15-29  5                               Kidney failure                             <15    CBC & Differential [[id number]] Collected:  01/11/17 0558    Specimen:  Blood Updated:  01/11/17 0753    Narrative:       The following orders were created for panel order CBC & Differential.  Procedure                               Abnormality         Status                     ---------                               -----------         ------                     CBC Auto Differential[[id number]]         Abnormal            Final result                 Please view results for these tests on the individual orders.    CBC Auto Differential [[id number]]  (Abnormal) Collected:  01/11/17 0558    Specimen:  Blood Updated:  01/11/17 0753     WBC 15.03 (H) 10*3/mm3      RBC 3.76 (L) 10*6/mm3      Hemoglobin 9.2 (L) g/dL      Hematocrit 31.2 (L) %      MCV 83.0 fL      MCH 24.5 (L) pg      MCHC 29.5 (L) g/dL      RDW 20.0 (H) %      RDW-SD 61.8 (H) fl      MPV 10.4 fL      Platelets 248 10*3/mm3       Neutrophil % 85.4 (H) %      Lymphocyte % 6.1 (L) %      Monocyte % 5.8 %      Eosinophil % 2.1 %      Basophil % 0.3 %      Immature Grans % 0.3 %      Neutrophils, Absolute 12.85 (H) 10*3/mm3      Lymphocytes, Absolute 0.92 10*3/mm3      Monocytes, Absolute 0.87 10*3/mm3      Eosinophils, Absolute 0.31 (H) 10*3/mm3      Basophils, Absolute 0.04 10*3/mm3      Immature Grans, Absolute 0.04 (H) 10*3/mm3     BNP [[id number]]  (Abnormal) Collected:  01/10/17 1803    Specimen:  Blood Updated:  01/11/17 0124     BNP 1018.0 (H) pg/mL     Protime-INR [[id number]]  (Abnormal) Collected:  01/10/17 1946    Specimen:  Blood Updated:  01/10/17 2040     Protime 13.2 (H) Seconds      INR 1.20     Narrative:       Therapeutic Ranges for INR: 2.0-3.0 (PT 20-30)                              2.5-3.5 (PT 25-34)    Urinalysis With / Culture If Indicated [[id number]]  (Abnormal) Collected:  01/10/17 1900    Specimen:  Urine from Urine, Catheter Updated:  01/10/17 1958     Color, UA Yellow      Appearance, UA Clear      pH, UA 6.0      Specific Gravity, UA 1.009      Glucose, UA Negative      Ketones, UA Negative      Bilirubin, UA Negative      Blood, UA Negative      Protein, UA Negative      Leuk Esterase, UA Moderate (2+) (A)      Nitrite, UA Negative      Urobilinogen, UA 0.2 E.U./dL     Urinalysis, Microscopic Only [[id number]]  (Abnormal) Collected:  01/10/17 1900    Specimen:  Urine from Urine, Catheter Updated:  01/10/17 1958     RBC, UA 0-2 /HPF      WBC, UA 21-30 (A) /HPF      Bacteria, UA None Seen /HPF      Squamous Epithelial Cells, UA 3-6 (A) /HPF      Yeast, UA Moderate/2+ Budding Yeast /HPF      Hyaline Casts, UA 0-6 /LPF      Methodology Manual Light Microscopy     Comprehensive Metabolic Panel [[id number]]  (Abnormal) Collected:  01/10/17 1803    Specimen:  Blood Updated:  01/10/17 1911     Glucose 97 mg/dL      BUN 26 (H) mg/dL      Creatinine 0.80 mg/dL      Sodium 136 mmol/L      Potassium 4.5 mmol/L      Chloride 98 (L)  mmol/L      CO2 27.0 mmol/L      Calcium 8.3 (L) mg/dL      Total Protein 5.7 g/dL      Albumin 3.30 g/dL      ALT (SGPT) 24 U/L      AST (SGOT) 31 U/L      Alkaline Phosphatase 151 (H) U/L      Total Bilirubin 0.4 mg/dL      eGFR Non African Amer 69 mL/min/1.73      Globulin 2.4 gm/dL      A/G Ratio 1.4 (L) g/dL      BUN/Creatinine Ratio 32.5 (H)      Anion Gap 11.0 mmol/L     Narrative:       National Kidney Foundation Guidelines    Stage                           Description                             GFR                      1                               Normal or High                          90+  2                               Mild decrease                            60-89  3                               Moderate decrease                   30-59  4                               Severe decrease                       15-29  5                               Kidney failure                             <15    Lactic Acid, Plasma [[id number]]  (Normal) Collected:  01/10/17 1803    Specimen:  Blood Updated:  01/10/17 1857     Lactate 1.3 mmol/L       Falsely depressed results may occur on samples drawn from patients receiving N-Acetylcysteine (NAC) or Metamizole.       Procalcitonin [[id number]] Collected:  01/10/17 1803    Specimen:  Blood Updated:  01/10/17 1854     Procalcitonin 2.86 ng/mL     Narrative:       As a Marker for Sepsis (Non-Neonates):   1. <0.5 ng/mL represents a low risk of severe sepsis and/or septic shock.  2. >2 ng/mL represents a high risk of severe sepsis and/or septic shock.    As a Marker for Lower Respiratory Tract Infections that require antibiotic therapy:    PCT on Admission     Antibiotic Therapy       6-12 Hrs later  > 0.5                Strongly Recommended             >0.25 - <0.5         Recommended  0.1 - 0.25           Discouraged              Remeasure/reassess PCT  <0.1                 Strongly Discouraged     Remeasure/reassess PCT                     PCT values of < 0.5 ng/mL do  "not exclude an infection, because localized infections (without systemic signs) may be associated with such low concentrations, or a systemic infection in its initial stages (< 6 hours). Furthermore, increased PCT can occur without infection. PCT concentrations between 0.5 and 2.0 ng/mL should be interpreted taking into account the patient's history. It is recommended to retest PCT within 6-24 hours if any concentrations < 2 ng/mL are obtained.    CBC & Differential [[id number]] Collected:  01/10/17 1803    Specimen:  Blood Updated:  01/10/17 1849    Narrative:       The following orders were created for panel order CBC & Differential.  Procedure                               Abnormality         Status                     ---------                               -----------         ------                     CBC Auto Differential[[id number]]         Abnormal            Final result                 Please view results for these tests on the individual orders.    CBC Auto Differential [[id number]]  (Abnormal) Collected:  01/10/17 1803    Specimen:  Blood Updated:  01/10/17 1849     WBC 29.32 (H) 10*3/mm3      RBC 3.96 10*6/mm3      Hemoglobin 10.1 (L) g/dL      Hematocrit 32.1 (L) %      MCV 81.1 fL      MCH 25.5 (L) pg      MCHC 31.5 (L) g/dL      RDW 20.1 (H) %      RDW-SD 60.4 (H) fl      MPV 10.4 fL      Platelets 284 10*3/mm3      Neutrophil % 91.6 (H) %      Lymphocyte % 3.1 (L) %      Monocyte % 4.7 %      Eosinophil % 0.0 %      Basophil % 0.1 %      Immature Grans % 0.5 %      Neutrophils, Absolute 26.82 (H) 10*3/mm3      Lymphocytes, Absolute 0.90 10*3/mm3      Monocytes, Absolute 1.39 (H) 10*3/mm3      Eosinophils, Absolute 0.01 (L) 10*3/mm3      Basophils, Absolute 0.04 10*3/mm3      Immature Grans, Absolute 0.16 (H) 10*3/mm3       Vitals     BP Pulse Temp Resp Ht Wt    135/72 (BP Location: Right arm, Patient Position: Lying) 67 97.8 °F (36.6 °C) (Oral) 16 59\" (149.9 cm) 115 lb 11.9 oz (52.5 kg)    SpO2 BMI       "       (!) 87% 23.38 kg/m2       Vitals History      Social History     Category History    Smoking Tobacco Use Former Smoker; Start date:     Smokeless Tobacco Use Never Used    Tobacco Comment     Alcohol Use Yes; 0.6 oz alcohol/wk; 1 Glasses of wine per week    Drug Use No    Sexual Activity Defer    ADL Not Asked      Patient Care Team        Relationship Specialty Notifications Start End    Sergio Garcia MD PCP - General Internal Medicine  9/18/16        Instructions for After Discharge        Follow-up Information     Follow up with Sergio Garcia MD Follow up on 1/23/2017.    Specialty:  Internal Medicine    Why:  Follow up with Dr. Garcia 1/23/17 at 11:30am.     Contact information:    [facility] [address] [phone]        Activity Instructions     Activity as Tolerated                 Diet Instructions     Advance Diet As Tolerated                 Referrals and Follow-ups to Schedule     Follow-Up    As directed    PCP in 1 week

## 2017-01-11 ENCOUNTER — APPOINTMENT (OUTPATIENT)
Dept: CT IMAGING | Facility: HOSPITAL | Age: 82
End: 2017-01-11

## 2017-01-11 PROBLEM — J96.01 ACUTE RESPIRATORY FAILURE WITH HYPOXIA (HCC): Status: ACTIVE | Noted: 2017-01-10

## 2017-01-11 LAB
ANION GAP SERPL CALCULATED.3IONS-SCNC: 9 MMOL/L (ref 3–11)
BASOPHILS # BLD AUTO: 0.04 10*3/MM3 (ref 0–0.2)
BASOPHILS NFR BLD AUTO: 0.3 % (ref 0–1)
BNP SERPL-MCNC: 1018 PG/ML (ref 0–100)
BUN BLD-MCNC: 21 MG/DL (ref 9–23)
BUN/CREAT SERPL: 30 (ref 7–25)
CALCIUM SPEC-SCNC: 7.6 MG/DL (ref 8.7–10.4)
CHLORIDE SERPL-SCNC: 105 MMOL/L (ref 99–109)
CO2 SERPL-SCNC: 22 MMOL/L (ref 20–31)
CREAT BLD-MCNC: 0.7 MG/DL (ref 0.6–1.3)
DEPRECATED RDW RBC AUTO: 61.8 FL (ref 37–54)
EOSINOPHIL # BLD AUTO: 0.31 10*3/MM3 (ref 0.1–0.3)
EOSINOPHIL NFR BLD AUTO: 2.1 % (ref 0–3)
ERYTHROCYTE [DISTWIDTH] IN BLOOD BY AUTOMATED COUNT: 20 % (ref 11.3–14.5)
FLUAV SUBTYP SPEC NAA+PROBE: NOT DETECTED
FLUBV RNA ISLT QL NAA+PROBE: NOT DETECTED
GFR SERPL CREATININE-BSD FRML MDRD: 80 ML/MIN/1.73
GLUCOSE BLD-MCNC: 73 MG/DL (ref 70–100)
HCT VFR BLD AUTO: 31.2 % (ref 34.5–44)
HGB BLD-MCNC: 9.2 G/DL (ref 11.5–15.5)
IMM GRANULOCYTES # BLD: 0.04 10*3/MM3 (ref 0–0.03)
IMM GRANULOCYTES NFR BLD: 0.3 % (ref 0–0.6)
LYMPHOCYTES # BLD AUTO: 0.92 10*3/MM3 (ref 0.6–4.8)
LYMPHOCYTES NFR BLD AUTO: 6.1 % (ref 24–44)
MCH RBC QN AUTO: 24.5 PG (ref 27–31)
MCHC RBC AUTO-ENTMCNC: 29.5 G/DL (ref 32–36)
MCV RBC AUTO: 83 FL (ref 80–99)
MONOCYTES # BLD AUTO: 0.87 10*3/MM3 (ref 0–1)
MONOCYTES NFR BLD AUTO: 5.8 % (ref 0–12)
NEUTROPHILS # BLD AUTO: 12.85 10*3/MM3 (ref 1.5–8.3)
NEUTROPHILS NFR BLD AUTO: 85.4 % (ref 41–71)
PLATELET # BLD AUTO: 248 10*3/MM3 (ref 150–450)
PMV BLD AUTO: 10.4 FL (ref 6–12)
POTASSIUM BLD-SCNC: 4 MMOL/L (ref 3.5–5.5)
RBC # BLD AUTO: 3.76 10*6/MM3 (ref 3.89–5.14)
SODIUM BLD-SCNC: 136 MMOL/L (ref 132–146)
WBC NRBC COR # BLD: 15.03 10*3/MM3 (ref 3.5–10.8)

## 2017-01-11 PROCEDURE — 87502 INFLUENZA DNA AMP PROBE: CPT | Performed by: NURSE PRACTITIONER

## 2017-01-11 PROCEDURE — 80048 BASIC METABOLIC PNL TOTAL CA: CPT | Performed by: NURSE PRACTITIONER

## 2017-01-11 PROCEDURE — 25010000002 ENOXAPARIN PER 10 MG

## 2017-01-11 PROCEDURE — 99233 SBSQ HOSP IP/OBS HIGH 50: CPT | Performed by: HOSPITALIST

## 2017-01-11 PROCEDURE — 92610 EVALUATE SWALLOWING FUNCTION: CPT

## 2017-01-11 PROCEDURE — 85025 COMPLETE CBC W/AUTO DIFF WBC: CPT | Performed by: INTERNAL MEDICINE

## 2017-01-11 PROCEDURE — 25010000002 VANCOMYCIN PER 500 MG

## 2017-01-11 PROCEDURE — 71250 CT THORAX DX C-: CPT

## 2017-01-11 PROCEDURE — 25010000002 PIPERACILLIN SOD-TAZOBACTAM PER 1 G

## 2017-01-11 RX ORDER — ATORVASTATIN CALCIUM 20 MG/1
20 TABLET, FILM COATED ORAL NIGHTLY
Status: DISCONTINUED | OUTPATIENT
Start: 2017-01-11 | End: 2017-01-13 | Stop reason: HOSPADM

## 2017-01-11 RX ORDER — LEVOTHYROXINE SODIUM 0.12 MG/1
125 TABLET ORAL DAILY
Status: DISCONTINUED | OUTPATIENT
Start: 2017-01-11 | End: 2017-01-13 | Stop reason: HOSPADM

## 2017-01-11 RX ORDER — FOLIC ACID 1 MG/1
1 TABLET ORAL DAILY
Status: DISCONTINUED | OUTPATIENT
Start: 2017-01-11 | End: 2017-01-13 | Stop reason: HOSPADM

## 2017-01-11 RX ORDER — CYCLOBENZAPRINE HCL 10 MG
10 TABLET ORAL ONCE
Status: COMPLETED | OUTPATIENT
Start: 2017-01-11 | End: 2017-01-11

## 2017-01-11 RX ORDER — CYCLOBENZAPRINE HCL 10 MG
10 TABLET ORAL 3 TIMES DAILY
Status: DISCONTINUED | OUTPATIENT
Start: 2017-01-11 | End: 2017-01-11

## 2017-01-11 RX ORDER — MULTIPLE VITAMINS W/ MINERALS TAB 9MG-400MCG
1 TAB ORAL DAILY
Status: DISCONTINUED | OUTPATIENT
Start: 2017-01-11 | End: 2017-01-13 | Stop reason: HOSPADM

## 2017-01-11 RX ORDER — ASPIRIN 81 MG/1
81 TABLET, CHEWABLE ORAL DAILY
Status: DISCONTINUED | OUTPATIENT
Start: 2017-01-11 | End: 2017-01-13 | Stop reason: HOSPADM

## 2017-01-11 RX ORDER — DULOXETIN HYDROCHLORIDE 60 MG/1
60 CAPSULE, DELAYED RELEASE ORAL DAILY
Status: DISCONTINUED | OUTPATIENT
Start: 2017-01-11 | End: 2017-01-13 | Stop reason: HOSPADM

## 2017-01-11 RX ORDER — PRAMIPEXOLE DIHYDROCHLORIDE 1 MG/1
1 TABLET ORAL NIGHTLY
Status: DISCONTINUED | OUTPATIENT
Start: 2017-01-11 | End: 2017-01-13 | Stop reason: HOSPADM

## 2017-01-11 RX ADMIN — ENOXAPARIN SODIUM 30 MG: 30 INJECTION SUBCUTANEOUS at 04:57

## 2017-01-11 RX ADMIN — CYCLOBENZAPRINE HYDROCHLORIDE 10 MG: 10 TABLET, FILM COATED ORAL at 23:46

## 2017-01-11 RX ADMIN — PRAMIPEXOLE DIHYDROCHLORIDE 1 MG: 1 TABLET ORAL at 01:55

## 2017-01-11 RX ADMIN — TAZOBACTAM SODIUM AND PIPERACILLIN SODIUM 4.5 G: 500; 4 INJECTION, SOLUTION INTRAVENOUS at 20:28

## 2017-01-11 RX ADMIN — HYDROCODONE BITARTRATE AND ACETAMINOPHEN 1 TABLET: 7.5; 325 TABLET ORAL at 17:53

## 2017-01-11 RX ADMIN — FLUCONAZOLE 150 MG: 150 TABLET ORAL at 20:29

## 2017-01-11 RX ADMIN — MULTIPLE VITAMINS W/ MINERALS TAB 1 TABLET: TAB ORAL at 09:31

## 2017-01-11 RX ADMIN — HYDROCODONE BITARTRATE AND ACETAMINOPHEN 1 TABLET: 7.5; 325 TABLET ORAL at 01:54

## 2017-01-11 RX ADMIN — PRAMIPEXOLE DIHYDROCHLORIDE 1 MG: 1 TABLET ORAL at 20:30

## 2017-01-11 RX ADMIN — TAZOBACTAM SODIUM AND PIPERACILLIN SODIUM 4.5 G: 500; 4 INJECTION, SOLUTION INTRAVENOUS at 01:54

## 2017-01-11 RX ADMIN — ASPIRIN 81 MG 81 MG: 81 TABLET ORAL at 09:30

## 2017-01-11 RX ADMIN — LEVOTHYROXINE SODIUM 125 MCG: 125 TABLET ORAL at 04:58

## 2017-01-11 RX ADMIN — ATORVASTATIN CALCIUM 20 MG: 20 TABLET, FILM COATED ORAL at 20:29

## 2017-01-11 RX ADMIN — TAZOBACTAM SODIUM AND PIPERACILLIN SODIUM 4.5 G: 500; 4 INJECTION, SOLUTION INTRAVENOUS at 15:35

## 2017-01-11 RX ADMIN — TAZOBACTAM SODIUM AND PIPERACILLIN SODIUM 4.5 G: 500; 4 INJECTION, SOLUTION INTRAVENOUS at 09:30

## 2017-01-11 RX ADMIN — FOLIC ACID 1 MG: 1 TABLET ORAL at 09:30

## 2017-01-11 RX ADMIN — DULOXETINE 60 MG: 60 CAPSULE, DELAYED RELEASE ORAL at 09:31

## 2017-01-11 RX ADMIN — ACETAMINOPHEN 650 MG: 325 TABLET, FILM COATED ORAL at 15:37

## 2017-01-11 RX ADMIN — VANCOMYCIN HYDROCHLORIDE 750 MG: 750 INJECTION, SOLUTION INTRAVENOUS at 20:34

## 2017-01-11 RX ADMIN — HYDROCODONE BITARTRATE AND ACETAMINOPHEN 1 TABLET: 7.5; 325 TABLET ORAL at 11:23

## 2017-01-11 NOTE — PLAN OF CARE
Problem: Patient Care Overview (Adult)  Goal: Plan of Care Review  Outcome: Ongoing (interventions implemented as appropriate)    01/11/17 0934   Coping/Psychosocial Response Interventions   Plan Of Care Reviewed With patient   Outcome Evaluation   Outcome Summary/Follow up Plan Clinical swallow eval complete. Functional swallow. Pt reports reflux w/ recent increase. No overt s/s of asp even when pushed w/ thins. REC: regular and thins, meds whole in thins, reflux procautions, no therpay indicatd.

## 2017-01-11 NOTE — PROGRESS NOTES
"      HOSPITALIST DAILY PROGRESS NOTE    Chief Complaint: f/u hypotension    Subjective   SUBJECTIVE/OVERNIGHT EVENTS   Patient seen this morning. She feels better. Eating breakfast. Says she has been dealing with a cold recently, has been coughing up some clear sputum. No other complaints today.    Review of Systems:  Gen-no fevers, no chills  CV-no chest pain, no palpitations  Resp-+cough, no dyspnea  GI-no N/V/D, no abd pain    Objective   OBJECTIVE   I have reviewed the vital signs.  Visit Vitals   • /70 (BP Location: Right arm, Patient Position: Lying)   • Pulse 59   • Temp 97.4 °F (36.3 °C) (Oral)   • Resp 18   • Ht 59\" (149.9 cm)   • Wt 115 lb 11.9 oz (52.5 kg)   • SpO2 93%   • BMI 23.38 kg/m2       Physical Exam:  Gen-no acute distress  CV-RRR, S1 S2 normal, no m/r/g  Resp-CTAB, no wheezes  Abd-soft, NT, ND, +BS  Ext-no edema  Neuro-A&Ox3, no focal deficits  Psych-appropriate mood    Results:  I have reviewed the labs, culture data, radiology results, and diagnostic studies.      Results from last 7 days  Lab Units 01/11/17  0558 01/10/17  1803   WBC 10*3/mm3 15.03* 29.32*   HEMOGLOBIN g/dL 9.2* 10.1*   HEMATOCRIT % 31.2* 32.1*   PLATELETS 10*3/mm3 248 284       Results from last 7 days  Lab Units 01/11/17  0558   SODIUM mmol/L 136   POTASSIUM mmol/L 4.0   CHLORIDE mmol/L 105   TOTAL CO2 mmol/L 22.0   BUN mg/dL 21   CREATININE mg/dL 0.70   GLUCOSE mg/dL 73   CALCIUM mg/dL 7.6*       Culture Data:  Cultures:    BLOOD CULTURE   Date Value Ref Range Status   01/10/2017 No growth at less than 24 hours  Preliminary   01/10/2017 No growth at less than 24 hours  Preliminary     URINE CULTURE   Date Value Ref Range Status   01/10/2017 No growth at 24 hours  Preliminary         Radiology Results:  Imaging Results (last 24 hours)     Procedure Component Value Units Date/Time    XR Chest 2 View [[id number]] Collected:  01/11/17 0933     Updated:  01/11/17 1254    Narrative:       EXAMINATION: XR CHEST 2 " VIEWS-01/10/2017:      INDICATION: Hypoxia, fever.      COMPARISON: NONE     FINDINGS:   1.  Cardiomegaly is noted. There is COPD and diffuse interstitial  fibrosis with granulomatous scarring.  2.  Superimposed upon these chronic senile changes, there is no evidence  of consolidation, edema or free fluid.  3.  There is marked dorsal arthropathy and there is severe right  shoulder arthropathy.           Impression:       1.  Cardiomegaly is noted with mild central vascular prominence.  Background pattern of significant interstitial and granulomatous  scarring is noted.  2.  Active disease, consolidation, free fluid or edema is otherwise not  identified.     D:  01/10/2017  E:  01/11/2017     This report was finalized on 1/11/2017 12:52 PM by Dr. Chi Talley MD.             I have reviewed the medications.      Assessment/Plan   ASSESSMENT/PLAN    Principal Problem:    Sepsis  Active Problems:    Acute UTI    Hypotension    Leukocytosis    Acute respiratory failure with hypoxia    Chronic a-fib    GERD (gastroesophageal reflux disease)    84 yo F presents from the [facility] due to low blood pressure and temp of 100.4. Found to have leukocytosis of 29,000.    PLAN:  --UA not terribly impressive. Will await urine culture along with blood cultures.   --CXR not so revealing, CT chest ordered and pending for further evaluation for source of sepsis. NH reported choking with food: SLP evaluation complete, no therapy indicated.   --Improving on Vanc/Zosyn.   --ID to see as she has a history of right elbow septic joint treated by Dr. Villatoro in Sept 2016.     Complex patient.    More than 50% of time spent counseling on current illness and plan of care. Case discussed with: patient  Total time of the encounter was 35 minutes.    I expect patient to be discharged in: 1-2 days    Charlotte Peace MD  01/11/17  1:17 PM

## 2017-01-11 NOTE — CONSULTS
Pharmacokinetic Consult - Antimicrobial Dosing    Pharmacy consulted to manage piperacillin/tazobactam and vancomycin therapies for this 82 yo female with sepsis of questionable etiology, PNA v. urosepsis . PMH significant for recent admission for acute blood loss anemia and septic v. hypovolemic shock for which she received the same antimicrobial therapies; history of MRSA right elbow septic arthritis. Her renal function is much better than previous visit when she had an YOGESH and was maintained on vancomycin 750 mg IV q24h.     Indication: Sepsis - urosepsis v. PNA  Consulting Provider: Dr. Arie Barr  Vancomycin target trough: 15-20 mcg/mL    Objective  Lab Results   Component Value Date    CREATININE 0.80 01/10/2017     Estimated Creatinine Clearance: 47.4 mL/min (by C-G formula based on Cr of 0.8).  I/O last 3 completed shifts:  In: 200 [I.V.:200]  Out: -   Wt Readings from Last 3 Encounters:   01/10/17 160 lb (72.6 kg)   12/09/16 152 lb 1.6 oz (69 kg)   10/19/16 130 lb (59 kg)     Lab Results   Component Value Date    WBC 29.32 (H) 01/10/2017     ANTI-INFECTIVE I-VENT REVIEW    Patient: Neida Burk  MRN#: [id number]  Attending: No name on file.  Admission Date: 011017    Current Antimicrobial Medications  IV Anti-Infectives       Ordered     Dose/Rate Route Frequency Start Stop      01/10/17 2155  vancomycin (VANCOCIN) IVPB 1 g (premix) in Dextrose 5% 200 mL     Ordering Provider:  Singh Champion IV, RPH    15 mg/kg × 72.6 kg  over 60 Minutes Intravenous Every 24 Hours 01/11/17 2100      01/10/17 2155  piperacillin-tazobactam (ZOSYN) 4.5 g in dextrose 100 mL IVPB (premix)     Ordering Provider:  Singh Champion IV, RPH    4.5 g  over 0.5 Hours Intravenous Every 6 Hours 01/11/17 0200      01/10/17 2148  fluconazole (DIFLUCAN) tablet 150 mg     Ordering Provider:  VANNA Connell    150 mg Oral Every 24 Hours Scheduled 01/10/17 2230      01/10/17 2148  Pharmacy to dose vancomycin     Ordering  Provider:  VANNA Suarez     Does not apply Continuous PRN 01/10/17 2148      01/10/17 1919  vancomycin IVPB 1500 mg in 0.9% NaCl (Premix) 500 mL     Ordering Provider:  Francisco Lockwood MD    20 mg/kg × 72.6 kg Intravenous Once 01/10/17 1921 01/10/17 2026    01/10/17 1919  piperacillin-tazobactam (ZOSYN) 4.5 g in dextrose 100 mL IVPB (premix)     Ordering Provider:  Francisco Lockwood MD    4.5 g Intravenous Once 01/10/17 1921 01/10/17 2026            Assessment/Plan    Ms. Burk received 4.5 gm of piperacillin/tazobactam and 1500 mg of IV vancomycin in the ED ~2030 this evening.     Start 4.5 gm IV piperacillin/tazobactam for septic PNA at 0200 tomorrow - if CAP rather than HCAP, reduce to 3.375 gm IV q6h.    Vancomycin 1000 mg (15 mg/kg) q24h starting tomorrow at 2100. Assess early vancomycin trough at 2030 on Thursday 1/12/17. Target trough 15-20 mcg/mL.    Thank you for this consult.  Singh Champion IV, PharmD, BCPS  1/10/2017  9:55 PM

## 2017-01-11 NOTE — PLAN OF CARE
Problem: Skin Integrity Impairment, Risk/Actual (Adult)  Goal: Identify Related Risk Factors and Signs and Symptoms  Outcome: Ongoing (interventions implemented as appropriate)    01/11/17 0449   Skin Integrity Impairment, Risk/Actual   Skin Integrity Impairment, Risk/Actual: Related Risk Factors fluid/nutrition status;sensory impairment   Signs and Symptoms (Skin Integrity Impairment) erythema nonblanchable         Problem: Infection, Risk/Actual (Adult)  Goal: Identify Related Risk Factors and Signs and Symptoms  Outcome: Ongoing (interventions implemented as appropriate)    01/11/17 0449   Infection, Risk/Actual   Infection, Risk/Actual: Related Risk Factors skin integrity impairment   Signs and Symptoms (Infection, Risk/Actual) lab value changes

## 2017-01-11 NOTE — THERAPY DISCHARGE NOTE
Acute Care - Speech Language Pathology   Swallow Eval/Discharge [facility]   Clinical Swallow Evaluation       Patient Name: Neida Burk  : 1933  MRN: [id number]  Today's Date: 2017               Admit Date: 1/10/2017    Visit Dx:    ICD-10-CM ICD-9-CM   1. Acute UTI N39.0 599.0   2. Sepsis, due to unspecified organism A41.9 038.9     995.91     Patient Active Problem List   Diagnosis   • CVA (cerebral vascular accident)   • Recent Elbow Infection   • Ulcerative esophagitis   • Acute UTI   • Sepsis   • Hypotension   • Leukocytosis   • Chronic a-fib   • GERD (gastroesophageal reflux disease)   • Acute respiratory failure     Past Medical History   Diagnosis Date   • Back pain    • Chronic a-fib    • CVA (cerebral vascular accident)      about 2-3 years ago, residual is slight gait problems   • Gastrointestinal hemorrhage    • GERD (gastroesophageal reflux disease)    • Guillain Barré syndrome    • Hypothyroid    • Malignant melanoma    • Melanoma    • MRSA (methicillin resistant Staphylococcus aureus)    • Ulcer of esophagus      Past Surgical History   Procedure Laterality Date   • Hip arthroplasty Right      Had complications with bone fx and anisha, 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 and wears shoes with elevated soles   • Back surgery       x3    • Elbow procedure     • Knee surgery     • Skin cancer excision     • Endoscopy N/A 12/3/2016     Procedure: ESOPHAGOGASTRODUODENOSCOPY AT BEDSIDE;  Surgeon: Chandler Maher MD;  Location: [facility] ENDOSCOPY;  Service:           SWALLOW EVALUATION (last 72 hours)      Swallow Evaluation       17 0845                Rehab Evaluation    Document Type evaluation  -LS        Subjective Information no complaints;agree to therapy  -LS        General Information    Patient Profile Review yes  -LS        Subjective Patient Observations alert and oriented  -LS        Pertinent History Of Current Problem admit w/ UTI, ?PNA vs. Broncitis   -LS         Current Diet Limitations thin liquids;regular solid  -        Plans/Goals Discussed With patient;agreed upon  -LS        Barriers to Rehab none identified  -LS        Clinical Impression    Patient's Goals For Discharge patient did not state  -        SLP Swallowing Diagnosis --   WFL  -LS        Rehab Potential/Prognosis, Swallowing good, to achieve stated therapy goals  -        Criteria for Skilled Therapeutic Interventions Met no problems identified which require skilled intervention  -        SLP Diet Recommendation regular textures;thin liquids  -        SLP Rec. for Method of Medication Administration meds whole with thin liquid  -LS        Pain Assessment    Pain Assessment No/denies pain  -LS        Oral Motor Structure and Function    Oral Motor Anatomy and Physiology patient demonstrates anatomy and physiology that is WNL  -LS        Dentition Assessment present and adequate  -LS        Secretion Management WNL/WFL  -LS        Mucosal Quality moist, healthy  -LS        Volitional Swallow no difficulties initiating volitional swallow  -LS        Volitional Cough no difficulties initiating volitional cough  -LS        General Swallow Observations    General Swallow Observations WFL  -        Clinical Swallow Exam    Oral Phase Results intact oral phase without signs of dysfunction  -LS        Pharyngeal Phase Results no signs/symptoms of pharyngeal impairment  -LS        Summary of Clinical Exam Functional swallow. Pt reports reflux w/ recent increase. No overt s/s of asp even when pushed w/ thins. REC: regular and thins, meds whole in thins, reflux procautions, no therpay indicatd.   -          User Key  (r) = Recorded By, (t) = Taken By, (c) = Cosigned By    Initials Name Effective Dates     Odessa Siddiqui MS [facility]-SLP 06/22/15 -         EDUCATION  The patient has been educated in the following areas:   Dysphagia (Swallowing Impairment) Oral Care/Hydration.    SLP Recommendation and Plan      SLP Diet Recommendation: regular textures, thin liquids     SLP Rec. for Method of Medication Administration: meds whole with thin liquid        Criteria for Skilled Therapeutic Interventions Met: no problems identified which require skilled intervention     Rehab Potential/Prognosis, Swallowing: good, to achieve stated therapy goals                Plan of Care Review  Plan Of Care Reviewed With: patient  Outcome Summary/Follow up Plan: Clinical swallow eval complete. Functional swallow. Pt reports reflux w/ recent increase. No overt s/s of asp even when pushed w/ thins. REC: regular and thins, meds whole in thins, reflux procautions, no therpay indicatd.              Time Calculation:         Time Calculation- SLP       01/11/17 0934          Time Calculation- SLP    SLP Start Time 0845  -      SLP Received On 01/11/17  -        User Key  (r) = Recorded By, (t) = Taken By, (c) = Cosigned By    Initials Name Provider Type     Odessa Siddiqui MS CCC-SLP Speech and Language Pathologist          Therapy Charges for Today     Code Description Service Date Service Provider Modifiers Qty    [id number]  ST EVAL ORAL PHARYNG SWALLOW 4 1/11/2017 Odessa Siddiqui, MS CCC-SLP GN 1                    Odessa Siddiqui MS CCC-SLP  1/11/2017

## 2017-01-11 NOTE — PROGRESS NOTES
"Adult Nutrition  Assessment/PES    Patient Name:  Neida Burk  YOB: 1933  MRN: [id number]  Admit Date:  1/10/2017    Assessment Date:  1/11/2017        Reason for Assessment       01/11/17 1148    Reason for Assessment    Reason For Assessment/Visit identified at risk by screening criteria    Identified At Risk By Screening Criteria MST SCORE 2+    Time Spent (min) 20    Diagnosis Diagnosis    Cardiac PAF;Hypotension    Endocrine Hypothyroid    Gastrointestinal GERD/Reflux   Pt has history of GI bleed    Infectious Disease [place]';MRSA;Sepsis;UTI    Neurological CVA    Oncology --   Pt has history of malignant melanoma    Skin --   Pt has history of MRSA    Other diagnosis --              Nutrition/Diet History       01/11/17 1158    Nutrition/Diet History    Reported/Observed By Patient    Other Pt stated appetite declined when she began taking antibiotic meds in Nov; appetite has improved since then, reported eating more than half of breakfast tray            Anthropometrics       01/11/17 1200    Anthropometrics    Height 149.9 cm (59\")    Weight 52.5 kg (115 lb 11.9 oz)    Ideal Body Weight (IBW)    Ideal Body Weight (IBW), Female 43.97    % Ideal Body Weight 119.64    Body Mass Index (BMI)    BMI (kg/m2) 23.43            Labs/Tests/Procedures/Meds       01/11/17 1200    Labs/Tests/Procedures/Meds    Labs/Tests Review Reviewed    Procedure Review SLP   Per SLP REC - Regular, Thins    Swallow eval status Done    Type of SLP Evaluation Bedside                Nutrition Prescription Ordered       01/11/17 1200    Nutrition Prescription PO    Current PO Diet Regular    Fluid Consistency Thin            Evaluation of Received Nutrient/Fluid Intake       01/11/17 1200    PO Evaluation    Number of Meals 1    % PO Intake 75              Problem/Interventions:        Problem 1       01/11/17 1201    Nutrition Diagnoses Problem 1    Problem 1 Nutrition Appropriate for Condition at this Time    " Etiology (related to) Medical Diagnosis   Clinical condition    Signs/Symptoms (evidenced by) PO Intake    Percent (%) intake recorded 75 %    Over number of meals 1                    Intervention Goal       01/11/17 1202    Intervention Goal    General Nutrition support treatment    PO Maintain intake            Nutrition Intervention       01/11/17 1202    Nutrition Intervention    RD/Tech Action Encourage intake;Follow Tx progress              Education/Evaluation       01/11/17 1202    Monitor/Evaluation    Monitor Per protocol;PO intake        Comments:      Electronically signed by:  Felicia Ortega  01/11/17 2:50 PM

## 2017-01-11 NOTE — PROGRESS NOTES
"Adult Nutrition  Assessment/PES    Patient Name:  Neida Burk  YOB: 1933  MRN: [id number]  Admit Date:  1/10/2017    Assessment Date:  1/11/2017        Reason for Assessment       01/11/17 1148    Reason for Assessment    Reason For Assessment/Visit identified at risk by screening criteria    Identified At Risk By Screening Criteria MST SCORE 2+    Time Spent (min) 20    Diagnosis Diagnosis    Cardiac PAF;Hypotension    Endocrine Hypothyroid    Gastrointestinal GERD/Reflux   Pt has history of GI bleed    Infectious Disease [place]';MRSA;Sepsis;UTI    Neurological CVA    Oncology --   Pt has history of malignant melanoma    Skin --   Pt has history of MRSA    Other diagnosis --              Nutrition/Diet History       01/11/17 1158    Nutrition/Diet History    Reported/Observed By Patient    Other Pt stated appetite declined when she began taking antibiotic meds in Nov; appetite has improved since then, reported eating more than half of breakfast tray            Anthropometrics       01/11/17 1200    Anthropometrics    Height 149.9 cm (59\")    Weight 52.5 kg (115 lb 11.9 oz)    Ideal Body Weight (IBW)    Ideal Body Weight (IBW), Female 43.97    % Ideal Body Weight 119.64    Body Mass Index (BMI)    BMI (kg/m2) 23.43            Labs/Tests/Procedures/Meds       01/11/17 1200    Labs/Tests/Procedures/Meds    Labs/Tests Review Reviewed                Nutrition Prescription Ordered       01/11/17 1200    Nutrition Prescription PO    Current PO Diet Regular    Fluid Consistency Thin            Evaluation of Received Nutrient/Fluid Intake       01/11/17 1200    PO Evaluation    Number of Meals 1    % PO Intake 75              Problem/Interventions:        Problem 1       01/11/17 1201    Nutrition Diagnoses Problem 1    Problem 1 Nutrition Appropriate for Condition at this Time    Etiology (related to) Medical Diagnosis   Clinical condition    Signs/Symptoms (evidenced by) PO Intake    Percent " (%) intake recorded 75 %    Over number of meals 1                    Intervention Goal       01/11/17 1202    Intervention Goal    General Nutrition support treatment    PO Maintain intake            Nutrition Intervention       01/11/17 1202    Nutrition Intervention    RD/Tech Action Encourage intake;Follow Tx progress              Education/Evaluation       01/11/17 1202    Monitor/Evaluation    Monitor Per protocol;PO intake        Comments:      Electronically signed by:  Felicai Ortega  01/11/17 2:29 PM

## 2017-01-11 NOTE — H&P
"  [facility] Medicine Services  HISTORY AND PHYSICAL    Primary Care Physician: Sergio Garcia MD    Subjective     Chief Complaint- hypotension     History of Present Illness   84 yo female presented to the ed w/ c/o a low blood pressure. Pt is currently at [facility] for rehabilitation. Pt's blood pressure noted to be low, pt brought to the ed for evaluation. She reports that she has felt ok with only a mildly productive cough that started this morning. Reports that her sputum is clear and that her cough improved this afternoon. She has had a \"head cold\" and some congestion for 4-5 days with improving symptoms. She was told that she had a fever this morning but does not remember the actual temperature. Has felt mildly short of breath today. Does not require supplemental oxygen at home. She denies feeling feverish or chilled. Denies N/V/D, abdominal pain. Denies chest pain, weakness. Does report small laceration to her right lower extremity that she has been dealing with for the past month, but denies drainage, redness or erythema of the surrounding tissue. Denies dysuria, urgency/frequency, but does report that her urine output has been decreased from baseline for the last 24-48 hours. Reports a healthy appetite and good PO intake. Pt admitted to the hospital medicine service for further evaluation.     Admitted in December of 2016 secondary to a GI bleed from esophagitis/esophageal ulcer. Had been on coumadin for chronic afib, per nursing home papers she stopped taking the coumadin 1/2/17. Also admitted in September for a septic right elbow- denies any edema, pain or erythema of any joints at this time.    Review of Systems   Constitutional: Negative for appetite change, chills, fatigue and fever.        Fever reported by [facility] staff, unclear how high her temperature reached. Denies feeling feverish or chilled.   HENT: Positive for congestion. Negative for rhinorrhea, sinus " pressure, sneezing and sore throat.         Head cold x 4-5 days w/ improving symptoms.    Eyes: Negative for visual disturbance.   Respiratory: Positive for shortness of breath. Negative for wheezing and stridor.         Cough w/ scant clear sputum production that started this morning and improved by this afternoon.    Cardiovascular: Negative for chest pain, palpitations and leg swelling.   Gastrointestinal: Negative for abdominal pain, blood in stool, constipation, diarrhea, nausea and vomiting.   Genitourinary: Positive for decreased urine volume. Negative for dysuria, flank pain, frequency, hematuria and urgency.   Musculoskeletal: Negative for back pain, neck pain and neck stiffness.   Skin: Positive for wound.        Small laceration- RLE x 1 month. No surrounding erythema or edema.   Neurological: Negative for dizziness, tremors, seizures, syncope, facial asymmetry, speech difficulty, weakness, light-headedness, numbness and headaches.   Psychiatric/Behavioral: Negative for confusion.   All other systems reviewed and are negative.     Otherwise complete ROS reviewed and negative except as mentioned in the HPI.      Past Medical History:   Past Medical History   Diagnosis Date   • Back pain    • Chronic a-fib    • CVA (cerebral vascular accident)      about 2-3 years ago, residual is slight gait problems   • Gastrointestinal hemorrhage    • GERD (gastroesophageal reflux disease)    • Guillain Barré syndrome    • Hypothyroid    • Malignant melanoma    • Melanoma    • MRSA (methicillin resistant Staphylococcus aureus)    • Ulcer of esophagus        Past Surgical History:  Past Surgical History   Procedure Laterality Date   • Hip arthroplasty Right      Had complications with bone fx and anisha, 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 and wears shoes with elevated soles   • Back surgery       x3    • Elbow procedure     • Knee surgery     • Skin cancer excision     • Endoscopy N/A 12/3/2016     Procedure:  "ESOPHAGOGASTRODUODENOSCOPY AT BEDSIDE;  Surgeon: Chandler Maher MD;  Location: [facility] ENDOSCOPY;  Service:        Family History:   Family History   Problem Relation Age of Onset   • Aneurysm Mother    • Stroke Mother    • No Known Problems Father      Social History:   Social History     Social History   • Marital status:      Spouse name: N/A   • Number of children: N/A   • Years of education: N/A     Occupational History   • Not on file.     Social History Main Topics   • Smoking status: Former Smoker   • Smokeless tobacco: Never Used   • Alcohol use 0.6 oz/week     1 Glasses of wine per week   • Drug use: No   • Sexual activity: Defer     Other Topics Concern   • Not on file     Social History Narrative   • No narrative on file       Medications:    (Not in a hospital admission)  Allergies:  Allergies   Allergen Reactions   • Codeine Nausea And Vomiting   • Lortab [Hydrocodone-Acetaminophen] Nausea And Vomiting       Objective     Vital Signs:   Visit Vitals   • /62   • Pulse 66   • Temp 98.2 °F (36.8 °C) (Oral)   • Resp 16   • Ht 59\" (149.9 cm)   • Wt 160 lb (72.6 kg)   • SpO2 96%   • BMI 32.32 kg/m2     Physical Exam   Constitutional: She is oriented to person, place, and time. She appears well-developed and well-nourished. No distress.   HENT:   Head: Normocephalic and atraumatic.   Eyes: EOM are normal. Pupils are equal, round, and reactive to light.   Neck: Normal range of motion. Neck supple.   Cardiovascular: Normal rate, regular rhythm, normal heart sounds and intact distal pulses.  Exam reveals no gallop and no friction rub.    No murmur heard.  Pulmonary/Chest: Effort normal and breath sounds normal. No respiratory distress. She has no wheezes. She has no rales. She exhibits no tenderness.   Abdominal: Soft. Bowel sounds are normal. She exhibits no distension. There is no tenderness. There is no guarding.   Musculoskeletal: Normal range of motion. She exhibits no edema or " deformity.   Neurological: She is alert and oriented to person, place, and time. No cranial nerve deficit.   Skin: Skin is warm and dry. No rash noted. She is not diaphoretic. No erythema. No pallor.   Small laceration/wound- RLE, 2cm x 2cm. Surrounding skin without edema or erythema. Scant amount of clear drainage noted.    Psychiatric: She has a normal mood and affect. Her behavior is normal. Judgment and thought content normal.   Vitals reviewed.      Results Reviewed:    Results from last 7 days  Lab Units 01/10/17  1803   WBC 10*3/mm3 29.32*   HEMOGLOBIN g/dL 10.1*   PLATELETS 10*3/mm3 284       Results from last 7 days  Lab Units 01/10/17  1803   SODIUM mmol/L 136   POTASSIUM mmol/L 4.5   TOTAL CO2 mmol/L 27.0   CREATININE mg/dL 0.80   GLUCOSE mg/dL 97   CALCIUM mg/dL 8.3*       I have personally reviewed and interpreted the radiology studies and ECG obtained at time of admission.     Assessment / Plan      Assessment & Plan  Principal Problem:    Acute UTI  Active Problems:    Sepsis    Hypotension    Leukocytosis    Chronic a-fib    GERD (gastroesophageal reflux disease)    Acute respiratory failure    PLAN:   82 yo female presented to the ed w/ c/o hypotension and mild dyspnea. Initial blood BP in ed was 86/43. Requiring 2 liters of oxygen w/ saturation of 92%. Chest xray, UA reviewed. UA revealed pyuria with no bacteria seen. WBC elevated @ 29.32. BP improved with IVF. Blood cultures collected. Flu A/B PCR pending. Pt started on broad spectrum antibiotics, including IV Vanc and Zosyn pending blood and urine cultures as source of infection is still somewhat unclear. Add Diflucan. Will continue IVF NS @ 100ml/hr, Vanc and Zosyn. Close telemetry monitoring, frequent BP checks, fall precautions.  WOC consulted for RLE wound care. Repeat CBC and CMP w/ am labs. Consider repeating chest x ray tomorrow. Consult infectious disease, pt has followed w/ Dr. Clemente in the past.     DVT prophylaxis: lovenox,  teds/scds- left leg only  Code Status: Full Code     I discussed the patients findings and my recommendations with: the patient, pt's family and the primary care team.     VANNA Connell 01/10/17 9:24 PM      Brief Attending Note   I have seen and examined the patient, performing an independent face-to-face diagnostic evaluation at bedside    The plan of care reviewed and developed with the advanced practice clinician (APC):   vanna Li  Brief Summary Statement/HPI:   84 yo f w/ history right septic elbow, sent from [facility] To er due to fever 100.4 and sbp 80's. Here wbc 29,000, cough and sats mid 80's. No n/v, no dysuria, yellow sputum. Per nurse, states caregiver (no longer here) was having some choking occasional w/ swallowing food      Attending Physical Exam:  Nontoxic appearing, does cough occasionally, oriented x3  Ncat, oroph clear  rrr  bilatearl rhonchi, no wheezes, normal effort  abd soft, nontender  No cce  nonfocal neuro exam      Brief Assessment/Plan :    *Hypoxia  *Sepsis (poa)   -unclear if pneumonia vs uti vs combination   -wbc 29,000, fever, sbp 80's, elevated procalcitonin  *Fever (100.4 at nursing home)  *Severe Leukocysosis  *funguria/rule out fungal uti  *Bronchitis vs pneumonia  *Hx right elbow septic joint (mrsa, corynebacterium sp grew 9/'16)   -saw dr. romero at that time  *Recent coughing while eating/r/o aspiration  *skin tear right shin    Plan:  Empiric vanc/zosyn given in er  Add diflucan  Follow urine/blood cultures  Volume resuscitation  CT chest  Wound care right shin lesion  Cbc, bmp  Add bnp  Dysphagia eval  Dr. Villatoro/i.d. Consult in a.m.    See above for further detailed assessment and plan developed with APC which I have reviewed and/or edited.    I believe this patient requires inpatient status      VANNA Connell 01/10/17 9:24 PM

## 2017-01-11 NOTE — PLAN OF CARE
Problem: Skin Integrity Impairment, Risk/Actual (Adult)  Goal: Identify Related Risk Factors and Signs and Symptoms  Outcome: Ongoing (interventions implemented as appropriate)

## 2017-01-11 NOTE — CONSULTS
INFECTIOUS DISEASE CONSULT/INITIAL HOSPITAL VISIT    Neida Burk  11/23/1933  [id number]    Date of Consult: 1/11/2017    Admission Date: 1/10/2017      Requesting Provider: No ref. provider found  Evaluating Physician: Alban Villatoro MD    Reason for Consultation:   Sepsis    History of present illness:    Patient is a 83 y.o. female who is seen today for evaluation of sepsis.  I recently treated her for an extensive MRSA right elbow septic tenosynovitis.  She initially received a prolonged course of daptomycin with gradual improvement.  I then switched her to Zyvox therapy.  After the Zyvox, she was switched to doxycycline.  She developed nausea and vomiting on the doxycycline.  She then presented with a significant upper GI bleed and secondary hypotension, acute renal failure, and elevated liver enzymes.  She appeared to have an acute upper GI bleed secondary to Ila-Andrea tear.  She was discharged to [facility] for rehabilitation.  She re-presented last evening after developing an upper respiratory tract infection followed by possible fever at the [facility].  She complains of a cough with minimal sputum production.  When she presented she was found to have hypotension.  Her blood pressure was down to 86 systolic.  She was also found to have significant leukocytosis and an elevated pro-calcitonin.  After cultures were obtained, she was started on intravenous vancomycin\ and Zosyn, along with fluconazole.  Her caretaker now indicates that she had a fever to 101.1° at the nursing home.    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     Social History Main Topics   • Smoking status: Former Smoker   • Smokeless tobacco: Never Used   • Alcohol use 0.6 oz/week     1 Glasses of wine per week   • Drug use: No   • Sexual activity: Defer     Other Topics Concern   • Not on file     Social History Narrative   • No narrative on file       Allergies   Allergen Reactions   • Codeine Nausea And Vomiting   • Lortab [Hydrocodone-Acetaminophen] Nausea And Vomiting         Medication:    Current Facility-Administered Medications:   •  acetaminophen (TYLENOL) tablet 650 mg, 650 mg, Oral, Q4H PRN, Haydee Cannon APRN, 650 mg at 01/11/17 1537  •  aspirin chewable tablet 81 mg, 81 mg, Oral, Daily, Arie Barr MD, 81 mg at 01/11/17 0930  •  atorvastatin (LIPITOR) tablet 20 mg, 20 mg, Oral, Nightly, Arie Barr MD  •  DULoxetine (CYMBALTA) DR capsule 60 mg, 60 mg, Oral, Daily, Arie Barr MD, 60 mg at 01/11/17 0931  •  enoxaparin (LOVENOX) syringe 30 mg, 30 mg, Subcutaneous, Q AM, Singh Champion IV, RPH, 30 mg at 01/11/17 0457  •  fluconazole (DIFLUCAN) tablet 150 mg, 150 mg, Oral, Q24H, Radha Lagos, APRN, 150 mg at 01/10/17 2250  •  folic acid (FOLVITE) tablet 1 mg, 1 mg, Oral, Daily,  Arie Barr MD, 1 mg at 01/11/17 0930  •  HYDROcodone-acetaminophen (NORCO) 7.5-325 MG per tablet 1 tablet, 1 tablet, Oral, Q6H PRN, Francisco Lockwood MD, 1 tablet at 01/11/17 1123  •  levothyroxine (SYNTHROID, LEVOTHROID) tablet 125 mcg, 125 mcg, Oral, Daily, Arie Barr MD, 125 mcg at 01/11/17 0458  •  multivitamin with minerals 1 tablet, 1 tablet, Oral, Daily, Arie Barr MD, 1 tablet at 01/11/17 0931  •  ondansetron (ZOFRAN) injection 4 mg, 4 mg, Intravenous, Q6H PRN, VANNA Suarez  •  Pharmacy Consult - Pharmacy to dose, , Does not apply, Continuous PRN, Arie Barr MD  •  Pharmacy to dose vancomycin, , Does not apply, Continuous PRN, VANNA Suarez  •  piperacillin-tazobactam (ZOSYN) 4.5 g in dextrose 100 mL IVPB (premix), 4.5 g, Intravenous, Q6H, Singh Champion IV, RPH, 4.5 g at 01/11/17 1535  •  pramipexole (MIRAPEX) tablet 1 mg, 1 mg, Oral, Nightly, Arei Barr MD, 1 mg at 01/11/17 0155  •  Insert peripheral IV, , , Once **AND** sodium chloride 0.9 % flush 10 mL, 10 mL, Intravenous, PRN, Francisco Lockwood MD  •  vancomycin (VANCOCIN) IVPB 750 mg, 15 mg/kg, Intravenous, Q24H, Harris Hdz RPH    Antibiotics:  IV Anti-Infectives     Ordered     Dose/Rate Route Frequency Start Stop    01/11/17 0030  vancomycin (VANCOCIN) IVPB 750 mg     Ordering Provider:  Harris Hdz RPH    15 mg/kg × 52.5 kg Intravenous Every 24 Hours 01/11/17 2100      01/10/17 2155  piperacillin-tazobactam (ZOSYN) 4.5 g in dextrose 100 mL IVPB (premix)     Ordering Provider:  Singh Champion IV, RPH    4.5 g  over 0.5 Hours Intravenous Every 6 Hours 01/11/17 0200      01/10/17 2148  fluconazole (DIFLUCAN) tablet 150 mg     Ordering Provider:  VANNA Connell    150 mg Oral Every 24 Hours Scheduled 01/10/17 2230      01/10/17 2148  Pharmacy to dose vancomycin     Ordering Provider:  VANNA Suarez     Does not apply Continuous PRN  01/10/17 2148      01/10/17 1919  vancomycin IVPB 1500 mg in 0.9% NaCl (Premix) 500 mL     Ordering Provider:  Francisco Lockwood MD    20 mg/kg × 72.6 kg Intravenous Once 01/10/17 1921 01/10/17 2026    01/10/17 1919  piperacillin-tazobactam (ZOSYN) 4.5 g in dextrose 100 mL IVPB (premix)     Ordering Provider:  Francisco Lockwood MD    4.5 g Intravenous Once 01/10/17 1921 01/10/17 2026            Review of Systems:  Constitutional-- she had malaise and fevers at the nursing home.  HEENT-- No new vision, hearing or throat complaints.  No epistaxis or oral sores.  Denies odynophagia or dysphagia. No headache, photophobia or neck stiffness.  CV-- No chest pain, palpitation or syncope  Resp-- she complains of cough with minimal sputum production.  She has some mild dyspnea.  GI- No nausea, vomiting, or diarrhea.  She had a recent significant upper GI bleed secondary to a Lia-Andrea tear but denies any recent nausea, vomiting, abdominal pain, hematochezia, or hematemesis.  -- No dysuria, hematuria, or flank pain.  Denies hesitancy, urgency or flank pain.  Lymph- no swollen lymph nodes in neck/axilla or groin.   Heme- No active bruising or bleeding; no Hx of DVT or PE.  MS-- no swelling or pain in the bones or joints of arms/legs.  No new back pain.  Neuro-- No acute focal weakness or numbness in the arms or legs.  No seizures.  Skin--No rashes or lesions      Physical Exam:   Vital Signs  Temp (24hrs), Av.7 °F (36.5 °C), Min:97.4 °F (36.3 °C), Max:98.2 °F (36.8 °C)    Temp  Min: 97.4 °F (36.3 °C)  Max: 98.2 °F (36.8 °C)  BP  Min: 86/43  Max: 131/77  Pulse  Min: 56  Max: 78  Resp  Min: 16  Max: 20  SpO2  Min: 82 %  Max: 99 %    GENERAL: Awake and alert, in no acute distress.   HEENT: Normocephalic, atraumatic.  PERRL. EOMI. No conjunctival injection. No icterus. Oropharynx clear without evidence of thrush or exudate. No evidence of peridontal disease.    NECK: Supple without nuchal rigidity. No mass.  LYMPH:  No cervical, axillary or inguinal lymphadenopathy.  HEART: RRR; 1-2/6 systolic murmur  LUNGS: She has right greater than left crackles in the bases   ABDOMEN: Soft, nontender, nondistended. Positive bowel sounds. No rebound or guarding. NO mass or HSM.  EXT:  No cyanosis, clubbing or edema. No cord.  : Genitalia generally unremarkable.  Without Huynh catheter.  MSK: FROM without joint effusions noted arms/legs.  Examination of the right elbow reveals no residual erythema, swelling, or drainage   SKIN: Is a shallow right lateral calf wound which is approximately 2.5 cm in diameter and 3 mm deep with no associated erythema    NEURO: Oriented to PPT. No focal deficits on motor/sensory exam at arms/legs.  PSYCHIATRIC: Normal insight and judgement. Cooperative with PE    Laboratory Data      Results from last 7 days  Lab Units 01/11/17  0558 01/10/17  1803   WBC 10*3/mm3 15.03* 29.32*   HEMOGLOBIN g/dL 9.2* 10.1*   HEMATOCRIT % 31.2* 32.1*   PLATELETS 10*3/mm3 248 284       Results from last 7 days  Lab Units 01/11/17  0558   SODIUM mmol/L 136   POTASSIUM mmol/L 4.0   CHLORIDE mmol/L 105   TOTAL CO2 mmol/L 22.0   BUN mg/dL 21   CREATININE mg/dL 0.70   GLUCOSE mg/dL 73   CALCIUM mg/dL 7.6*       Results from last 7 days  Lab Units 01/10/17  1803   ALK PHOS U/L 151*   BILIRUBIN mg/dL 0.4   ALT (SGPT) U/L 24   AST (SGOT) U/L 31               Results from last 7 days  Lab Units 01/10/17  1803   LACTATE mmol/L 1.3             Estimated Creatinine Clearance: 38.3 mL/min (by C-G formula based on Cr of 0.7).      Microbiology:  BLOOD CULTURE   Date Value Ref Range Status   01/10/2017 No growth at less than 24 hours  Preliminary                    URINE CULTURE   Date Value Ref Range Status   01/10/2017 No growth at 24 hours  Preliminary              Radiology:  Imaging Results (last 72 hours)     Procedure Component Value Units Date/Time    XR Chest 2 View [[id number]] Collected:  01/11/17 0933     Updated:  01/11/17 6960     Narrative:       EXAMINATION: XR CHEST 2 VIEWS-01/10/2017:      INDICATION: Hypoxia, fever.      COMPARISON: NONE     FINDINGS:   1.  Cardiomegaly is noted. There is COPD and diffuse interstitial  fibrosis with granulomatous scarring.  2.  Superimposed upon these chronic senile changes, there is no evidence  of consolidation, edema or free fluid.  3.  There is marked dorsal arthropathy and there is severe right  shoulder arthropathy.           Impression:       1.  Cardiomegaly is noted with mild central vascular prominence.  Background pattern of significant interstitial and granulomatous  scarring is noted.  2.  Active disease, consolidation, free fluid or edema is otherwise not  identified.     D:  01/10/2017  E:  2017     This report was finalized on 2017 12:52 PM by Dr. Chi Talley MD.       CT Chest Without Contrast [[id number]]      Updated:  17 1639            Impression:   1.  Sepsis-she presented with hypotension, fevers at the nursing home (unquantified), marked leukocytosis, pyuria, and an elevated pro-calcitonin.  She has a history of a recent severe right elbow MRSA infection.  She does not have any evidence of a current flare of MRSA right elbow infection.  Potential sources of sepsis include pneumonia and UTI.  2.  Recent right elbow MRSA infection  3.  Recent severe upper GI bleed-this appears to been secondary to a Lia-Andrea tear from nausea/vomiting  4.  Leukocytosis/neutrophilia-improving  5.  Elevated pro-calcitonin-this supports the diagnosis of sepsis  6.  URI-she is at risk for development of pneumonia  7.  Pyuria-with yeast on urinalysis.  She is currently on fluconazole for a yeast UTI    PLAN/RECOMMENDATIONS:   Thank you for asking us to see Neida Burk, I recommend the followin.  Blood cultures-pending  2.  Urine culture-pending  3.  Chest CT scan-this was just performed  4.  Continue vancomycin and Zosyn pending culture data  6.  Continue fluconazole          Alban Villatoro MD  1/11/2017  4:39 PM

## 2017-01-11 NOTE — PROGRESS NOTES
Pharmacokinetic Consult - Vancomycin Dosing  Neida Burk is a 83 y.o. female who has been consulted for vancomycin dosing for pneumonia vs. urosepsis  (goal trough 15-20 mcg/mL).    Patient's weight was 72.6kg at time of initial pharmacy note.  Patient's weight has been updated to 52.5kg.  I have adjusted initial maintenance doses from 1000mg to 750mg.  Relevant clinical data and objective history reviewed:  CREATININE   Date Value Ref Range Status   01/10/2017 0.80 0.60 - 1.30 mg/dL Final   12/02/2016 2.80 (H) 0.60 - 1.30 mg/dL Final   04/08/2015 0.6 0.6 - 1.3 mg/dL Final     Estimated Creatinine Clearance: 38.3 mL/min (by C-G formula based on Cr of 0.8).    I/O last 3 completed shifts:  In: 200 [I.V.:200]  Out: -   Patient weight: 115 lb 12.8 oz (52.5 kg)    Asessment/Plan  Will initiate dose at 1500 mg IV once (already given)       followed by  Vancomycin 750mg IV q 24 hours    Vancomycin trough is scheduled 01/12 at 2030 prior to the 3rd dose    Harris Hdz [facility]  1/11/2017  12:26 AM

## 2017-01-11 NOTE — PLAN OF CARE
Problem: Patient Care Overview (Adult)  Goal: Plan of Care Review  Outcome: Ongoing (interventions implemented as appropriate)    01/11/17 1506   Coping/Psychosocial Response Interventions   Plan Of Care Reviewed With patient   Outcome Evaluation   Outcome Summary/Follow up Plan Patient presents with right posterior lower leg ulceration POA. Patient unaware of how she sustained wound. Wound presents with mild moist slough. See LDA's for details. Applied Thera honey for autolytic debridement promotion. Then covered with silicone border dressing. Will continue to follow at this time. Please contact [facility] if any further needs arise.    Patient Care Overview   Progress no change         Problem: Wound, Traumatic, Nonburn (Adult)  Goal: Signs and Symptoms of Listed Potential Problems Will be Absent or Manageable (Wound, Traumatic, Nonburn)  Outcome: Ongoing (interventions implemented as appropriate)    01/11/17 1506   Wound, Traumatic, Nonburn   Problems Assessed (Wound) all   Problems Present (Wound) skin breakdown

## 2017-01-12 LAB
ANION GAP SERPL CALCULATED.3IONS-SCNC: 7 MMOL/L (ref 3–11)
BASOPHILS # BLD AUTO: 0.02 10*3/MM3 (ref 0–0.2)
BASOPHILS NFR BLD AUTO: 0.2 % (ref 0–1)
BUN BLD-MCNC: 18 MG/DL (ref 9–23)
BUN/CREAT SERPL: 25.7 (ref 7–25)
CALCIUM SPEC-SCNC: 7.9 MG/DL (ref 8.7–10.4)
CHLORIDE SERPL-SCNC: 105 MMOL/L (ref 99–109)
CO2 SERPL-SCNC: 26 MMOL/L (ref 20–31)
CREAT BLD-MCNC: 0.7 MG/DL (ref 0.6–1.3)
DEPRECATED RDW RBC AUTO: 60.7 FL (ref 37–54)
EOSINOPHIL # BLD AUTO: 0.65 10*3/MM3 (ref 0.1–0.3)
EOSINOPHIL NFR BLD AUTO: 6 % (ref 0–3)
ERYTHROCYTE [DISTWIDTH] IN BLOOD BY AUTOMATED COUNT: 20 % (ref 11.3–14.5)
GFR SERPL CREATININE-BSD FRML MDRD: 80 ML/MIN/1.73
GLUCOSE BLD-MCNC: 72 MG/DL (ref 70–100)
HCT VFR BLD AUTO: 32 % (ref 34.5–44)
HGB BLD-MCNC: 9.7 G/DL (ref 11.5–15.5)
IMM GRANULOCYTES # BLD: 0.04 10*3/MM3 (ref 0–0.03)
IMM GRANULOCYTES NFR BLD: 0.4 % (ref 0–0.6)
LYMPHOCYTES # BLD AUTO: 0.76 10*3/MM3 (ref 0.6–4.8)
LYMPHOCYTES NFR BLD AUTO: 7 % (ref 24–44)
MCH RBC QN AUTO: 24.9 PG (ref 27–31)
MCHC RBC AUTO-ENTMCNC: 30.3 G/DL (ref 32–36)
MCV RBC AUTO: 82.3 FL (ref 80–99)
MONOCYTES # BLD AUTO: 0.65 10*3/MM3 (ref 0–1)
MONOCYTES NFR BLD AUTO: 6 % (ref 0–12)
NEUTROPHILS # BLD AUTO: 8.67 10*3/MM3 (ref 1.5–8.3)
NEUTROPHILS NFR BLD AUTO: 80.4 % (ref 41–71)
PLATELET # BLD AUTO: 271 10*3/MM3 (ref 150–450)
PMV BLD AUTO: 9.8 FL (ref 6–12)
POTASSIUM BLD-SCNC: 3.8 MMOL/L (ref 3.5–5.5)
RBC # BLD AUTO: 3.89 10*6/MM3 (ref 3.89–5.14)
SODIUM BLD-SCNC: 138 MMOL/L (ref 132–146)
VANCOMYCIN TROUGH SERPL-MCNC: 8.7 MCG/ML (ref 10–20)
WBC NRBC COR # BLD: 10.79 10*3/MM3 (ref 3.5–10.8)

## 2017-01-12 PROCEDURE — 80202 ASSAY OF VANCOMYCIN: CPT

## 2017-01-12 PROCEDURE — 25010000002 ENOXAPARIN PER 10 MG

## 2017-01-12 PROCEDURE — 80048 BASIC METABOLIC PNL TOTAL CA: CPT | Performed by: HOSPITALIST

## 2017-01-12 PROCEDURE — 99233 SBSQ HOSP IP/OBS HIGH 50: CPT | Performed by: HOSPITALIST

## 2017-01-12 PROCEDURE — 25010000002 PIPERACILLIN SOD-TAZOBACTAM PER 1 G

## 2017-01-12 PROCEDURE — 85025 COMPLETE CBC W/AUTO DIFF WBC: CPT | Performed by: HOSPITALIST

## 2017-01-12 RX ORDER — HYDROXYCHLOROQUINE SULFATE 200 MG/1
200 TABLET, FILM COATED ORAL DAILY
Status: DISCONTINUED | OUTPATIENT
Start: 2017-01-12 | End: 2017-01-13 | Stop reason: HOSPADM

## 2017-01-12 RX ORDER — MELATONIN
1000 DAILY
Status: DISCONTINUED | OUTPATIENT
Start: 2017-01-12 | End: 2017-01-13 | Stop reason: HOSPADM

## 2017-01-12 RX ORDER — CYCLOBENZAPRINE HCL 10 MG
10 TABLET ORAL 3 TIMES DAILY PRN
Status: DISCONTINUED | OUTPATIENT
Start: 2017-01-12 | End: 2017-01-13 | Stop reason: HOSPADM

## 2017-01-12 RX ORDER — PANTOPRAZOLE SODIUM 40 MG/1
40 TABLET, DELAYED RELEASE ORAL DAILY
Status: DISCONTINUED | OUTPATIENT
Start: 2017-01-12 | End: 2017-01-13 | Stop reason: HOSPADM

## 2017-01-12 RX ORDER — BACLOFEN 10 MG/1
10 TABLET ORAL NIGHTLY
Status: DISCONTINUED | OUTPATIENT
Start: 2017-01-12 | End: 2017-01-13 | Stop reason: HOSPADM

## 2017-01-12 RX ORDER — GABAPENTIN 300 MG/1
600 CAPSULE ORAL NIGHTLY
Status: DISCONTINUED | OUTPATIENT
Start: 2017-01-12 | End: 2017-01-13 | Stop reason: HOSPADM

## 2017-01-12 RX ADMIN — ASPIRIN 81 MG 81 MG: 81 TABLET ORAL at 08:42

## 2017-01-12 RX ADMIN — VITAMIN D, TAB 1000IU (100/BT) 1000 UNITS: 25 TAB at 14:36

## 2017-01-12 RX ADMIN — ATORVASTATIN CALCIUM 20 MG: 20 TABLET, FILM COATED ORAL at 21:19

## 2017-01-12 RX ADMIN — HYDROCODONE BITARTRATE AND ACETAMINOPHEN 1 TABLET: 7.5; 325 TABLET ORAL at 14:33

## 2017-01-12 RX ADMIN — BACLOFEN 10 MG: 10 TABLET ORAL at 21:19

## 2017-01-12 RX ADMIN — TAZOBACTAM SODIUM AND PIPERACILLIN SODIUM 4.5 G: 500; 4 INJECTION, SOLUTION INTRAVENOUS at 02:22

## 2017-01-12 RX ADMIN — HYDROCODONE BITARTRATE AND ACETAMINOPHEN 1 TABLET: 7.5; 325 TABLET ORAL at 21:19

## 2017-01-12 RX ADMIN — TAZOBACTAM SODIUM AND PIPERACILLIN SODIUM 4.5 G: 500; 4 INJECTION, SOLUTION INTRAVENOUS at 21:18

## 2017-01-12 RX ADMIN — GABAPENTIN 600 MG: 300 CAPSULE ORAL at 21:19

## 2017-01-12 RX ADMIN — DULOXETINE 60 MG: 60 CAPSULE, DELAYED RELEASE ORAL at 08:42

## 2017-01-12 RX ADMIN — CYCLOBENZAPRINE HYDROCHLORIDE 10 MG: 10 TABLET, FILM COATED ORAL at 21:57

## 2017-01-12 RX ADMIN — HYDROXYCHLOROQUINE SULFATE 200 MG: 200 TABLET, FILM COATED ORAL at 14:36

## 2017-01-12 RX ADMIN — TAZOBACTAM SODIUM AND PIPERACILLIN SODIUM 4.5 G: 500; 4 INJECTION, SOLUTION INTRAVENOUS at 08:00

## 2017-01-12 RX ADMIN — ENOXAPARIN SODIUM 30 MG: 30 INJECTION SUBCUTANEOUS at 05:27

## 2017-01-12 RX ADMIN — HYDROCODONE BITARTRATE AND ACETAMINOPHEN 1 TABLET: 7.5; 325 TABLET ORAL at 05:26

## 2017-01-12 RX ADMIN — PRAMIPEXOLE DIHYDROCHLORIDE 1 MG: 1 TABLET ORAL at 21:19

## 2017-01-12 RX ADMIN — FLUCONAZOLE 150 MG: 150 TABLET ORAL at 21:19

## 2017-01-12 RX ADMIN — MULTIPLE VITAMINS W/ MINERALS TAB 1 TABLET: TAB ORAL at 08:42

## 2017-01-12 RX ADMIN — PANTOPRAZOLE SODIUM 40 MG: 40 TABLET, DELAYED RELEASE ORAL at 14:38

## 2017-01-12 RX ADMIN — TAZOBACTAM SODIUM AND PIPERACILLIN SODIUM 4.5 G: 500; 4 INJECTION, SOLUTION INTRAVENOUS at 14:36

## 2017-01-12 RX ADMIN — FOLIC ACID 1 MG: 1 TABLET ORAL at 08:48

## 2017-01-12 RX ADMIN — CYCLOBENZAPRINE HYDROCHLORIDE 10 MG: 10 TABLET, FILM COATED ORAL at 15:59

## 2017-01-12 RX ADMIN — LEVOTHYROXINE SODIUM 125 MCG: 125 TABLET ORAL at 05:27

## 2017-01-12 NOTE — PLAN OF CARE
Problem: Skin Integrity Impairment, Risk/Actual (Adult)  Goal: Identify Related Risk Factors and Signs and Symptoms  Outcome: Ongoing (interventions implemented as appropriate)    01/12/17 0442   Skin Integrity Impairment, Risk/Actual   Skin Integrity Impairment, Risk/Actual: Related Risk Factors fluid/nutrition status   Signs and Symptoms (Skin Integrity Impairment) erythema nonblanchable         Problem: Infection, Risk/Actual (Adult)  Goal: Identify Related Risk Factors and Signs and Symptoms  Outcome: Ongoing (interventions implemented as appropriate)    01/12/17 0442   Infection, Risk/Actual   Infection, Risk/Actual: Related Risk Factors skin integrity impairment   Signs and Symptoms (Infection, Risk/Actual) lab value changes         Problem: Patient Care Overview (Adult)  Goal: Plan of Care Review  Outcome: Ongoing (interventions implemented as appropriate)    01/12/17 0442   Coping/Psychosocial Response Interventions   Plan Of Care Reviewed With patient   Patient Care Overview   Progress progress toward functional goals as expected

## 2017-01-12 NOTE — PROGRESS NOTES
[facility] Progress Note    Admission Date: 1/10/2017    Neida Burk  11/23/1933  [id number]    Date: 1/12/2017    Antibiotics:  IV Anti-Infectives     Ordered     Dose/Rate Route Frequency Start Stop    01/11/17 0030  vancomycin (VANCOCIN) IVPB 750 mg     Ordering Provider:  Harris Hdz, RPH    15 mg/kg × 52.5 kg Intravenous Every 24 Hours 01/11/17 2100      01/10/17 2155  piperacillin-tazobactam (ZOSYN) 4.5 g in dextrose 100 mL IVPB (premix)     Ordering Provider:  Singh Champion IV, RPH    4.5 g  over 0.5 Hours Intravenous Every 6 Hours 01/11/17 0200      01/10/17 2148  fluconazole (DIFLUCAN) tablet 150 mg     Ordering Provider:  VANNA Connell    150 mg Oral Every 24 Hours Scheduled 01/10/17 2230      01/10/17 2148  Pharmacy to dose vancomycin     Ordering Provider:  VANNA Suarez     Does not apply Continuous PRN 01/10/17 2148      01/10/17 1919  vancomycin IVPB 1500 mg in 0.9% NaCl (Premix) 500 mL     Ordering Provider:  Francisco Lockwood MD    20 mg/kg × 72.6 kg Intravenous Once 01/10/17 1921 01/10/17 2026    01/10/17 1919  piperacillin-tazobactam (ZOSYN) 4.5 g in dextrose 100 mL IVPB (premix)     Ordering Provider:  Francisco Lockwood MD    4.5 g Intravenous Once 01/10/17 1921 01/10/17 2026          CC:  Sepsis  HPI:  1/11/17: Patient is a 83 y.o. female who is seen today for evaluation of sepsis. I recently treated her for an extensive MRSA right elbow septic tenosynovitis. She initially received a prolonged course of daptomycin with gradual improvement. I then switched her to Zyvox therapy. After the Zyvox, she was switched to doxycycline. She developed nausea and vomiting on the doxycycline. She then presented with a significant upper GI bleed and secondary hypotension, acute renal failure, and elevated liver enzymes. She appeared to have an acute upper GI bleed secondary to Lia-Andrea tear. She was discharged to the [facility] for rehabilitation. She re-presented last evening  after developing an upper respiratory tract infection followed by possible fever at the [facility]. She complains of a cough with minimal sputum production. When she presented she was found to have hypotension. Her blood pressure was down to 86 systolic. She was also found to have significant leukocytosis and an elevated pro-calcitonin. After cultures were obtained, she was started on intravenous vancomycin\ and Zosyn, along with fluconazole. Her caretaker now indicates that she had a fever to 101.1° at the nursing home.  1/12/17: She feels substantially better today.  She has remained afebrile.  She notes a decrease in her cough and denies sputum production.  She denies dyspnea and chest pain.  She has no nausea, vomiting, or diarrhea.  She denies urinary frequency and dysuria.  She denies any increase in her right elbow discomfort.     PE:  Vital Signs  Temp  Min: 97.6 °F (36.4 °C)  Max: 98 °F (36.7 °C)  BP  Min: 108/65  Max: 143/74  Pulse  Min: 64  Max: 80  Resp  Min: 18  Max: 20  SpO2  Min: 94 %  Max: 96 %    GENERAL: Awake and alert, in no acute distress.   HEENT: Normocephalic, atraumatic.  PERRL. EOMI. No conjunctival injection. No icterus. Oropharynx clear without evidence of thrush or exudate.  NECK: Supple without nuchal rigidity. No mass.  LYMPH: No cervical, axillary or inguinal lymphadenopathy.  HEART: RRR; No murmur, rubs, gallops.   LUNGS: Minimal right basilar crackles  ABDOMEN: Soft, nontender, nondistended. Positive bowel sounds. No rebound or guarding. NO mass or HSM.  EXT:  She has no erythema over the right elbow  : Genitalia generally unremarkable.  Without Huynh catheter.  MSK: FROM without joint effusions noted arms/legs.    SKIN: Warm and dry without cutaneous eruptions on Inspection/palpation.    NEURO: Oriented to PPT. No focal deficits on motor/sensory exam at arms/legs.    Laboratory Data      Results from last 7 days  Lab Units 01/12/17  0704 01/11/17  0558 01/10/17  1803   WBC 10*3/mm3  10.79 15.03* 29.32*   HEMOGLOBIN g/dL 9.7* 9.2* 10.1*   HEMATOCRIT % 32.0* 31.2* 32.1*   PLATELETS 10*3/mm3 271 248 284       Results from last 7 days  Lab Units 01/12/17  0704   SODIUM mmol/L 138   POTASSIUM mmol/L 3.8   CHLORIDE mmol/L 105   TOTAL CO2 mmol/L 26.0   BUN mg/dL 18   CREATININE mg/dL 0.70   GLUCOSE mg/dL 72   CALCIUM mg/dL 7.9*       Results from last 7 days  Lab Units 01/10/17  1803   ALK PHOS U/L 151*   BILIRUBIN mg/dL 0.4   ALT (SGPT) U/L 24   AST (SGOT) U/L 31               Estimated Creatinine Clearance: 38.3 mL/min (by C-G formula based on Cr of 0.7).      Microbiology:  Blood cultures are no growth so far  Influenza PCR is negative  Urine culture is growing yeast    Radiology:  Imaging Results (last 72 hours)     Procedure Component Value Units Date/Time    XR Chest 2 View [[id number]] Collected:  01/11/17 0933     Updated:  01/11/17 1254    Narrative:       EXAMINATION: XR CHEST 2 VIEWS-01/10/2017:      INDICATION: Hypoxia, fever.      COMPARISON: NONE     FINDINGS:   1.  Cardiomegaly is noted. There is COPD and diffuse interstitial  fibrosis with granulomatous scarring.  2.  Superimposed upon these chronic senile changes, there is no evidence  of consolidation, edema or free fluid.  3.  There is marked dorsal arthropathy and there is severe right  shoulder arthropathy.           Impression:       1.  Cardiomegaly is noted with mild central vascular prominence.  Background pattern of significant interstitial and granulomatous  scarring is noted.  2.  Active disease, consolidation, free fluid or edema is otherwise not  identified.     D:  01/10/2017  E:  01/11/2017     This report was finalized on 1/11/2017 12:52 PM by Dr. Chi Talley MD.       CT Chest Without Contrast [[id number]]      Updated:  01/11/17 1639      Chest CT scan reveals bibasilar right greater than left patchy infiltrates by my reading but this has not yet been read by the radiologist    I personally reviewed the radiographic  studies     IMPRESSION:   1. Sepsis-she presented with hypotension, fevers at the nursing home (unquantified), marked leukocytosis, pyuria, and an elevated pro-calcitonin. She has a history of a recent severe right elbow MRSA infection. She does not have any evidence of a current flare of MRSA right elbow infection. Potential sources of sepsis include pneumonia and UTI.  2. Recent right elbow MRSA infection  3. Recent severe upper GI bleed-this appears to been secondary to a Lia-Andrea tear from nausea/vomiting  4. Leukocytosis/neutrophilia-improving  5. Elevated pro-calcitonin-this supports the diagnosis of sepsis  6.  Pneumonia-she appears to have patchy bibasilar infiltrates on chest CT scan.  7.  Yeast UTI-she is on fluconazole therapy       RECOMMENDATIONS:   1. Blood cultures-pending  2. Urine culture-pending  3. Continue fluconazole  4. Continue vancomycin and Zosyn pending culture data    I discussed her situation with Dr. Peace today.  We may be able to switch her to oral antibiotic therapy in the next 1-2 days if her cultures remain negative and she is clinically improved.    Alban Villatoro MD  1/12/2017

## 2017-01-12 NOTE — PROGRESS NOTES
Continued Stay Note  [facility]     Patient Name: Neida Burk  MRN: [id number]  Today's Date: 1/12/2017    Admit Date: 1/10/2017          Discharge Plan       01/12/17 1420    Case Management/Social Work Plan    Plan update    Patient/Family In Agreement With Plan yes    Additional Comments Pt confirmed she was at the [facility] in [place] prior to coming to [facility] she was there for SN and PT. She has a bed hold and the [facility] rep Nicole is aware she is here and ready to recieve the pt when she is ready for d/c. Pt has transportation w family upon d/c. No other needs at this time.              Discharge Codes     None        Expected Discharge Date and Time     Expected Discharge Date Expected Discharge Time    Jan 16, 2017             Gisella Vaughn RN

## 2017-01-12 NOTE — PROGRESS NOTES
"      HOSPITALIST DAILY PROGRESS NOTE    Chief Complaint: f/u hypotension    Subjective   SUBJECTIVE/OVERNIGHT EVENTS   Patient seen this morning. She feels well, sitting up to eat breakfast. Asking for some of her home meds that were not restarted on admission, mostly to help with her chronic arthritis pain.     Review of Systems:  Gen-no fevers, no chills  CV-no chest pain, no palpitations  Resp-improved cough, no dyspnea  GI-no N/V/D, no abd pain    Objective   OBJECTIVE   I have reviewed the vital signs.  Visit Vitals   • /74 (BP Location: Right arm, Patient Position: Lying)   • Pulse 66   • Temp 97.6 °F (36.4 °C) (Oral)   • Resp 18   • Ht 59\" (149.9 cm)   • Wt 115 lb 11.9 oz (52.5 kg)   • SpO2 95%   • BMI 23.38 kg/m2       Physical Exam:  Gen-no acute distress  CV-RRR, S1 S2 normal, no m/r/g  Resp-CTAB, no wheezes  Abd-soft, NT, ND, +BS  Ext-no edema  Neuro-A&Ox3, no focal deficits  Psych-appropriate mood    Results:  I have reviewed the labs, culture data, radiology results, and diagnostic studies.      Results from last 7 days  Lab Units 01/12/17  0704 01/11/17  0558 01/10/17  1803   WBC 10*3/mm3 10.79 15.03* 29.32*   HEMOGLOBIN g/dL 9.7* 9.2* 10.1*   HEMATOCRIT % 32.0* 31.2* 32.1*   PLATELETS 10*3/mm3 271 248 284       Results from last 7 days  Lab Units 01/12/17  0704   SODIUM mmol/L 138   POTASSIUM mmol/L 3.8   CHLORIDE mmol/L 105   TOTAL CO2 mmol/L 26.0   BUN mg/dL 18   CREATININE mg/dL 0.70   GLUCOSE mg/dL 72   CALCIUM mg/dL 7.9*       Culture Data:  Cultures:    BLOOD CULTURE   Date Value Ref Range Status   01/10/2017 No growth at 24 hours  Preliminary   01/10/2017 No growth at 24 hours  Preliminary     URINE CULTURE   Date Value Ref Range Status   01/10/2017 70,000-80,000 CFU/mL Yeast isolated (A)  Preliminary         Radiology Results:  CT chest pending official read but personally reviewed and shows some patchy infiltrates at the bases bilaterally     I have reviewed the " medications.      Assessment/Plan   ASSESSMENT/PLAN    Principal Problem:    Sepsis  Active Problems:    Acute UTI    Hypotension    Leukocytosis    Acute respiratory failure with hypoxia    Chronic a-fib    GERD (gastroesophageal reflux disease)    82 yo F presents from the [facility] due to low blood pressure and temp of 100.4. Found to have leukocytosis of 29,000.    PLAN:  --UA not terribly impressive but urine culture is growing yeast. --CXR not so revealing, CT chest ordered and pending official read, appears to show some bibasilar infiltrates on my read. NH reported choking with food: SLP evaluation complete, no overt signs on aspiration noted, so no therapy indicated.   --Improving on Vanc/Zosyn/Diflucan.  --Leukocytosis resolved.  --Resume home meds.   --ID following.     Complex patient.    More than 50% of time spent counseling on current illness and plan of care. Case discussed with: patient, Dr. Villatoro  Total time of the encounter was 35 minutes.    I expect patient to be discharged in: 1-2 days    Charlotte Peace MD  01/12/17  9:47 AM

## 2017-01-13 VITALS
TEMPERATURE: 97.8 F | OXYGEN SATURATION: 87 % | DIASTOLIC BLOOD PRESSURE: 72 MMHG | HEART RATE: 67 BPM | WEIGHT: 115.74 LBS | RESPIRATION RATE: 16 BRPM | HEIGHT: 59 IN | BODY MASS INDEX: 23.33 KG/M2 | SYSTOLIC BLOOD PRESSURE: 135 MMHG

## 2017-01-13 PROBLEM — J69.0 ASPIRATION PNEUMONIA (HCC): Status: ACTIVE | Noted: 2017-01-13

## 2017-01-13 LAB
BACTERIA SPEC AEROBE CULT: ABNORMAL
DEPRECATED RDW RBC AUTO: 60.5 FL (ref 37–54)
ERYTHROCYTE [DISTWIDTH] IN BLOOD BY AUTOMATED COUNT: 19.6 % (ref 11.3–14.5)
HCT VFR BLD AUTO: 33.1 % (ref 34.5–44)
HGB BLD-MCNC: 9.8 G/DL (ref 11.5–15.5)
MCH RBC QN AUTO: 24.4 PG (ref 27–31)
MCHC RBC AUTO-ENTMCNC: 29.6 G/DL (ref 32–36)
MCV RBC AUTO: 82.5 FL (ref 80–99)
PLATELET # BLD AUTO: 311 10*3/MM3 (ref 150–450)
PMV BLD AUTO: 10.4 FL (ref 6–12)
RBC # BLD AUTO: 4.01 10*6/MM3 (ref 3.89–5.14)
WBC NRBC COR # BLD: 7.93 10*3/MM3 (ref 3.5–10.8)

## 2017-01-13 PROCEDURE — 99239 HOSP IP/OBS DSCHRG MGMT >30: CPT | Performed by: HOSPITALIST

## 2017-01-13 PROCEDURE — 25010000002 VANCOMYCIN PER 500 MG

## 2017-01-13 PROCEDURE — 25010000002 PIPERACILLIN SOD-TAZOBACTAM PER 1 G

## 2017-01-13 PROCEDURE — 25010000002 ENOXAPARIN PER 10 MG

## 2017-01-13 PROCEDURE — 85027 COMPLETE CBC AUTOMATED: CPT | Performed by: HOSPITALIST

## 2017-01-13 RX ORDER — FLUCONAZOLE 150 MG/1
150 TABLET ORAL
Qty: 4 TABLET | Refills: 0
Start: 2017-01-13 | End: 2017-01-17

## 2017-01-13 RX ORDER — VANCOMYCIN HYDROCHLORIDE 1 G/200ML
1000 INJECTION, SOLUTION INTRAVENOUS EVERY 24 HOURS
Status: DISCONTINUED | OUTPATIENT
Start: 2017-01-13 | End: 2017-01-13

## 2017-01-13 RX ORDER — AMOXICILLIN AND CLAVULANATE POTASSIUM 875; 125 MG/1; MG/1
1 TABLET, FILM COATED ORAL EVERY 12 HOURS SCHEDULED
Qty: 8 TABLET | Refills: 0
Start: 2017-01-13 | End: 2017-01-17

## 2017-01-13 RX ORDER — AMOXICILLIN AND CLAVULANATE POTASSIUM 875; 125 MG/1; MG/1
1 TABLET, FILM COATED ORAL EVERY 12 HOURS SCHEDULED
Status: DISCONTINUED | OUTPATIENT
Start: 2017-01-13 | End: 2017-01-13 | Stop reason: HOSPADM

## 2017-01-13 RX ADMIN — PANTOPRAZOLE SODIUM 40 MG: 40 TABLET, DELAYED RELEASE ORAL at 09:25

## 2017-01-13 RX ADMIN — MULTIPLE VITAMINS W/ MINERALS TAB 1 TABLET: TAB ORAL at 09:24

## 2017-01-13 RX ADMIN — LEVOTHYROXINE SODIUM 125 MCG: 125 TABLET ORAL at 06:00

## 2017-01-13 RX ADMIN — TAZOBACTAM SODIUM AND PIPERACILLIN SODIUM 4.5 G: 500; 4 INJECTION, SOLUTION INTRAVENOUS at 03:38

## 2017-01-13 RX ADMIN — DULOXETINE 60 MG: 60 CAPSULE, DELAYED RELEASE ORAL at 12:08

## 2017-01-13 RX ADMIN — VITAMIN D, TAB 1000IU (100/BT) 1000 UNITS: 25 TAB at 12:08

## 2017-01-13 RX ADMIN — VANCOMYCIN HYDROCHLORIDE 1000 MG: 1 INJECTION, SOLUTION INTRAVENOUS at 10:27

## 2017-01-13 RX ADMIN — FOLIC ACID 1 MG: 1 TABLET ORAL at 09:25

## 2017-01-13 RX ADMIN — TAZOBACTAM SODIUM AND PIPERACILLIN SODIUM 4.5 G: 500; 4 INJECTION, SOLUTION INTRAVENOUS at 09:23

## 2017-01-13 RX ADMIN — CYCLOBENZAPRINE HYDROCHLORIDE 10 MG: 10 TABLET, FILM COATED ORAL at 12:08

## 2017-01-13 RX ADMIN — HYDROCODONE BITARTRATE AND ACETAMINOPHEN 1 TABLET: 7.5; 325 TABLET ORAL at 10:27

## 2017-01-13 RX ADMIN — HYDROXYCHLOROQUINE SULFATE 200 MG: 200 TABLET, FILM COATED ORAL at 12:08

## 2017-01-13 RX ADMIN — ASPIRIN 81 MG 81 MG: 81 TABLET ORAL at 09:23

## 2017-01-13 RX ADMIN — AMOXICILLIN AND CLAVULANATE POTASSIUM 1 TABLET: 875; 125 TABLET, FILM COATED ORAL at 14:05

## 2017-01-13 RX ADMIN — ENOXAPARIN SODIUM 30 MG: 30 INJECTION SUBCUTANEOUS at 06:00

## 2017-01-13 NOTE — PLAN OF CARE
Problem: Skin Integrity Impairment, Risk/Actual (Adult)  Goal: Identify Related Risk Factors and Signs and Symptoms  Outcome: Ongoing (interventions implemented as appropriate)    Problem: Patient Care Overview (Adult)  Goal: Plan of Care Review  Outcome: Ongoing (interventions implemented as appropriate)    01/13/17 3602   Coping/Psychosocial Response Interventions   Plan Of Care Reviewed With patient   Patient Care Overview   Progress progress toward functional goals as expected

## 2017-01-13 NOTE — PROGRESS NOTES
[facility] Progress Note    Admission Date: 1/10/2017    Neida Burk  1933  [id number]    Date: 2017    Antibiotics:  IV Anti-Infectives     Ordered     Dose/Rate Route Frequency Start Stop    17 0758  vancomycin (VANCOCIN) IVPB 1 g (premix) in Dextrose 5% 200 mL     Ordering Provider:  Rick Ferguson, RP    1,000 mg  over 60 Minutes Intravenous Every 24 Hours 17 0900      17 0907  hydroxychloroquine (PLAQUENIL) tablet 200 mg     Ordering Provider:  Charlotte WILDE MD    200 mg Oral Daily 17 1000      17 0030  vancomycin (VANCOCIN) IVPB 750 mg     Eric Alicea [facility] let the order  on 17.   Ordering Provider:  Charlotte WILDE MD    15 mg/kg × 52.5 kg Intravenous Every 24 Hours 17 2100 17 0001    01/10/17 215  piperacillin-tazobactam (ZOSYN) 4.5 g in dextrose 100 mL IVPB (premix)     Ordering Provider:  Singh Champion IV [facility]    4.5 g  over 0.5 Hours Intravenous Every 6 Hours 17 0200      01/10/17 214  fluconazole (DIFLUCAN) tablet 150 mg     Ordering Provider:  VANNA Connell    150 mg Oral Every 24 Hours Scheduled 01/10/17 2230      01/10/17 2148  Pharmacy to dose vancomycin     Ordering Provider:  VANNA Suarez     Does not apply Continuous PRN 01/10/17 2148      01/10/17 1919  vancomycin IVPB 1500 mg in 0.9% NaCl (Premix) 500 mL     Ordering Provider:  Francisco Lockwood MD    20 mg/kg × 72.6 kg Intravenous Once 01/10/17 1921 01/10/17 2026    01/10/17 1919  piperacillin-tazobactam (ZOSYN) 4.5 g in dextrose 100 mL IVPB (premix)     Ordering Provider:  Francisco Lockwood MD    4.5 g Intravenous Once 01/10/17 1921 01/10/17 2026          CC:  Sepsis  HPI:  17: Patient is a 83 y.o. female who is seen today for evaluation of sepsis. I recently treated her for an extensive MRSA right elbow septic tenosynovitis. She initially received a prolonged course of daptomycin with gradual improvement. I then switched her  to Zyvox therapy. After the Zyvox, she was switched to doxycycline. She developed nausea and vomiting on the doxycycline. She then presented with a significant upper GI bleed and secondary hypotension, acute renal failure, and elevated liver enzymes. She appeared to have an acute upper GI bleed secondary to Lia-Andrea tear. She was discharged to the [facility] for rehabilitation. She re-presented last evening after developing an upper respiratory tract infection followed by possible fever at the [facility]. She complains of a cough with minimal sputum production. When she presented she was found to have hypotension. Her blood pressure was down to 86 systolic. She was also found to have significant leukocytosis and an elevated pro-calcitonin. After cultures were obtained, she was started on intravenous vancomycin\ and Zosyn, along with fluconazole. Her caretaker now indicates that she had a fever to 101.1° at the nursing home.  1/12/17: She feels substantially better today.  She has remained afebrile.  She notes a decrease in her cough and denies sputum production.  She denies dyspnea and chest pain.  She has no nausea, vomiting, or diarrhea.  She denies urinary frequency and dysuria.  She denies any increase in her right elbow discomfort.  1/3/17: She continues to feel better.  She has a decreased cough and denies dyspnea.  She denies nausea, vomiting, and diarrhea.  Her nursing home indicated that she appeared to choke on some food.     PE:  Vital Signs  Temp  Min: 97.8 °F (36.6 °C)  Max: 97.9 °F (36.6 °C)  BP  Min: 126/78  Max: 135/72  Pulse  Min: 67  Max: 75  Resp  Min: 16  Max: 18  SpO2  Min: 86 %  Max: 88 %    GENERAL: Awake and alert, in no acute distress.   HEENT: Normocephalic, atraumatic.  PERRL. EOMI. No conjunctival injection. No icterus. Oropharynx clear without evidence of thrush or exudate.  NECK: Supple without nuchal rigidity. No mass.  LYMPH: No cervical, axillary or inguinal lymphadenopathy.  HEART:  RRR; No murmur, rubs, gallops.   LUNGS: She still has some right greater than left bibasilar crackles.  ABDOMEN: Soft, nontender, nondistended. Positive bowel sounds. No rebound or guarding. NO mass or HSM.  EXT:  She has no erythema over the right elbow  : Genitalia generally unremarkable.  Without Huynh catheter.  MSK: FROM without joint effusions noted arms/legs.    SKIN: Warm and dry without cutaneous eruptions on Inspection/palpation.    NEURO: Oriented to PPT. No focal deficits on motor/sensory exam at arms/legs.    Laboratory Data      Results from last 7 days  Lab Units 01/13/17  0700 01/12/17  0704 01/11/17  0558   WBC 10*3/mm3 7.93 10.79 15.03*   HEMOGLOBIN g/dL 9.8* 9.7* 9.2*   HEMATOCRIT % 33.1* 32.0* 31.2*   PLATELETS 10*3/mm3 311 271 248       Results from last 7 days  Lab Units 01/12/17  0704   SODIUM mmol/L 138   POTASSIUM mmol/L 3.8   CHLORIDE mmol/L 105   TOTAL CO2 mmol/L 26.0   BUN mg/dL 18   CREATININE mg/dL 0.70   GLUCOSE mg/dL 72   CALCIUM mg/dL 7.9*       Results from last 7 days  Lab Units 01/10/17  1803   ALK PHOS U/L 151*   BILIRUBIN mg/dL 0.4   ALT (SGPT) U/L 24   AST (SGOT) U/L 31               Estimated Creatinine Clearance: 38.3 mL/min (by C-G formula based on Cr of 0.7).      Microbiology:  Blood cultures are no growth so far  Influenza PCR is negative  Urine culture is growing yeast    Radiology:  Imaging Results (last 72 hours)     Procedure Component Value Units Date/Time    XR Chest 2 View [[id number]] Collected:  01/11/17 0933     Updated:  01/11/17 1254    Narrative:       EXAMINATION: XR CHEST 2 VIEWS-01/10/2017:      INDICATION: Hypoxia, fever.      COMPARISON: NONE     FINDINGS:   1.  Cardiomegaly is noted. There is COPD and diffuse interstitial  fibrosis with granulomatous scarring.  2.  Superimposed upon these chronic senile changes, there is no evidence  of consolidation, edema or free fluid.  3.  There is marked dorsal arthropathy and there is severe right  shoulder  arthropathy.           Impression:       1.  Cardiomegaly is noted with mild central vascular prominence.  Background pattern of significant interstitial and granulomatous  scarring is noted.  2.  Active disease, consolidation, free fluid or edema is otherwise not  identified.     D:  01/10/2017  E:  01/11/2017     This report was finalized on 1/11/2017 12:52 PM by Dr. Chi Talley MD.       CT Chest Without Contrast [[id number]]      Updated:  01/11/17 1639      Chest CT scan reveals bibasilar right greater than left patchy infiltrates by my reading but this has not yet been read by the radiologist    I personally reviewed the radiographic studies     IMPRESSION:   1. Sepsis-this appears to have been secondary to pneumonia rather than a yeast UTI.  With her history of choking on food, she may have an aspiration pneumonia.  I will plan to switch her antibiotic therapy to oral Augmentin.  2. Recent right elbow MRSA infection  3. Recent severe upper GI bleed-this appears to been secondary to a Lia-Andrea tear from nausea/vomiting  4. Leukocytosis/neutrophilia-improving  5. Elevated pro-calcitonin-this supports the diagnosis of sepsis  6.  Pneumonia-this appears to be an aspiration pneumonia.  I will plan to switch her to oral Augmentin.  7.  Yeast UTI-she is on fluconazole therapy       RECOMMENDATIONS:   1.  Discontinue vancomycin and Zosyn  2.  Augmentin 875 mg by mouth twice a day ×4 days  3.  Continue fluconazole for 4 more days  4.  Consider discharge today    I discussed her situation with Dr. Peace again today.  She should be able to discharge to her subacute nursing facility today    Alban Villatoro MD  1/13/2017

## 2017-01-13 NOTE — PROGRESS NOTES
Adult Nutrition  Assessment/PES    Patient Name:  Neida Burk  YOB: 1933  MRN: [id number]  Admit Date:  1/10/2017    Assessment Date:  1/13/2017        Reason for Assessment       01/13/17 0720    Reason for Assessment    Reason For Assessment/Visit follow up protocol    Time Spent (min) 5                                Problem/Interventions:        Problem 1       01/13/17 0721    Nutrition Diagnoses Problem 1    Problem 1 Nutrition Appropriate for Condition at this Time    Signs/Symptoms (evidenced by) PO Intake                          Education/Evaluation       01/13/17 0721    Monitor/Evaluation    Monitor Per protocol        Comments:      Electronically signed by:  Klaudia Cannon RD  01/13/17 7:21 AM

## 2017-01-13 NOTE — DISCHARGE SUMMARY
HOSPITAL MEDICINE DISCHARGE SUMMARY    Date of Admission: 1/10/2017  Date of Discharge:  1/13/2017    Discharge Diagnoses:  Principal Problem:    Sepsis  Active Problems:    UTI (urinary tract infection)    Hypotension    Leukocytosis    Acute respiratory failure with hypoxia    Aspiration pneumonia    Chronic a-fib    GERD (gastroesophageal reflux disease)      Presenting Problem/History of Present Illness  Acute UTI [N39.0]  Patient is a 83 y.o. female presented from the [facility] due to low blood pressure, cough, and congestion.     Discharge Day HPI:  Patient seen this morning. Some mild confusion when she first woke up but has improved as day went on. Caretaker at bedside. Patient feels well and has no complaints. Wants to go home.     ROS:  Gen-no fevers, chills  CV-no chest pain, palpitations  Resp-improved cough, no dyspnea  GI-no N/V/D, abd pain    Hospital Course  Patient admitted to hospitalist service. BP improved with IV fluids. CXR initially was not very revealing, but CT chest was done and did show consolidative opacities at the bases bilaterally and in the RUL/RLL most likely pneumonia. Her urine culture grew 70,000-80,000 yeast. She was initially on broad spectrum ATBx with Vanc, Zosyn, as well as Diflucan. Infectious Disease was consulted and followed along. She continued to improve. Patient was felt most likely to have aspiration PNA. She had speech team evaluate her and they recommended regular diet and thin liquids and reflux precautions, no further therapy was indicated. ID recommended 4 more days of Augmentin and Diflucan to complete her treatment course. She has reached maximal benefit of inpatient hospitalization and will be discharged back to the [facility] today. She has been intermittently requiring minimal supplemental O2 here, and this can be continued at the [facility] and efforts should be made to wean her back to room air while she completes treatment for pneumonia.     Procedures  Performed         Consults:   Consults     Date and Time Order Name Status Description    1/10/2017 2350 Inpatient Consult to Infectious Diseases            Pertinent Test Results:   Lab Results (last 7 days)     Procedure Component Value Units Date/Time    CBC & Differential [[id number]] Collected:  01/10/17 1803    Specimen:  Blood Updated:  01/10/17 1849    Narrative:       The following orders were created for panel order CBC & Differential.  Procedure                               Abnormality         Status                     ---------                               -----------         ------                     CBC Auto Differential[[id number]]         Abnormal            Final result                 Please view results for these tests on the individual orders.    CBC Auto Differential [[id number]]  (Abnormal) Collected:  01/10/17 1803    Specimen:  Blood Updated:  01/10/17 1849     WBC 29.32 (H) 10*3/mm3      RBC 3.96 10*6/mm3      Hemoglobin 10.1 (L) g/dL      Hematocrit 32.1 (L) %      MCV 81.1 fL      MCH 25.5 (L) pg      MCHC 31.5 (L) g/dL      RDW 20.1 (H) %      RDW-SD 60.4 (H) fl      MPV 10.4 fL      Platelets 284 10*3/mm3      Neutrophil % 91.6 (H) %      Lymphocyte % 3.1 (L) %      Monocyte % 4.7 %      Eosinophil % 0.0 %      Basophil % 0.1 %      Immature Grans % 0.5 %      Neutrophils, Absolute 26.82 (H) 10*3/mm3      Lymphocytes, Absolute 0.90 10*3/mm3      Monocytes, Absolute 1.39 (H) 10*3/mm3      Eosinophils, Absolute 0.01 (L) 10*3/mm3      Basophils, Absolute 0.04 10*3/mm3      Immature Grans, Absolute 0.16 (H) 10*3/mm3     Procalcitonin [[id number]] Collected:  01/10/17 1803    Specimen:  Blood Updated:  01/10/17 1854     Procalcitonin 2.86 ng/mL     Narrative:       As a Marker for Sepsis (Non-Neonates):   1. <0.5 ng/mL represents a low risk of severe sepsis and/or septic shock.  2. >2 ng/mL represents a high risk of severe sepsis and/or septic shock.    As a Marker for Lower Respiratory Tract  Infections that require antibiotic therapy:    PCT on Admission     Antibiotic Therapy       6-12 Hrs later  > 0.5                Strongly Recommended             >0.25 - <0.5         Recommended  0.1 - 0.25           Discouraged              Remeasure/reassess PCT  <0.1                 Strongly Discouraged     Remeasure/reassess PCT                     PCT values of < 0.5 ng/mL do not exclude an infection, because localized infections (without systemic signs) may be associated with such low concentrations, or a systemic infection in its initial stages (< 6 hours). Furthermore, increased PCT can occur without infection. PCT concentrations between 0.5 and 2.0 ng/mL should be interpreted taking into account the patient's history. It is recommended to retest PCT within 6-24 hours if any concentrations < 2 ng/mL are obtained.    Lactic Acid, Plasma [[id number]]  (Normal) Collected:  01/10/17 1803    Specimen:  Blood Updated:  01/10/17 1857     Lactate 1.3 mmol/L       Falsely depressed results may occur on samples drawn from patients receiving N-Acetylcysteine (NAC) or Metamizole.       Comprehensive Metabolic Panel [[id number]]  (Abnormal) Collected:  01/10/17 1803    Specimen:  Blood Updated:  01/10/17 1911     Glucose 97 mg/dL      BUN 26 (H) mg/dL      Creatinine 0.80 mg/dL      Sodium 136 mmol/L      Potassium 4.5 mmol/L      Chloride 98 (L) mmol/L      CO2 27.0 mmol/L      Calcium 8.3 (L) mg/dL      Total Protein 5.7 g/dL      Albumin 3.30 g/dL      ALT (SGPT) 24 U/L      AST (SGOT) 31 U/L      Alkaline Phosphatase 151 (H) U/L      Total Bilirubin 0.4 mg/dL      eGFR Non African Amer 69 mL/min/1.73      Globulin 2.4 gm/dL      A/G Ratio 1.4 (L) g/dL      BUN/Creatinine Ratio 32.5 (H)      Anion Gap 11.0 mmol/L     Narrative:       National Kid[employer] Guidelines    Stage                           Description                             GFR                      1                               Normal or High                           90+  2                               Mild decrease                            60-89  3                               Moderate decrease                   30-59  4                               Severe decrease                       15-29  5                               Kidney failure                             <15    Urinalysis With / Culture If Indicated [[id number]]  (Abnormal) Collected:  01/10/17 1900    Specimen:  Urine from Urine, Catheter Updated:  01/10/17 1958     Color, UA Yellow      Appearance, UA Clear      pH, UA 6.0      Specific Gravity, UA 1.009      Glucose, UA Negative      Ketones, UA Negative      Bilirubin, UA Negative      Blood, UA Negative      Protein, UA Negative      Leuk Esterase, UA Moderate (2+) (A)      Nitrite, UA Negative      Urobilinogen, UA 0.2 E.U./dL     Urinalysis, Microscopic Only [[id number]]  (Abnormal) Collected:  01/10/17 1900    Specimen:  Urine from Urine, Catheter Updated:  01/10/17 1958     RBC, UA 0-2 /HPF      WBC, UA 21-30 (A) /HPF      Bacteria, UA None Seen /HPF      Squamous Epithelial Cells, UA 3-6 (A) /HPF      Yeast, UA Moderate/2+ Budding Yeast /HPF      Hyaline Casts, UA 0-6 /LPF      Methodology Manual Light Microscopy     Protime-INR [[id number]]  (Abnormal) Collected:  01/10/17 1946    Specimen:  Blood Updated:  01/10/17 2040     Protime 13.2 (H) Seconds      INR 1.20     Narrative:       Therapeutic Ranges for INR: 2.0-3.0 (PT 20-30)                              2.5-3.5 (PT 25-34)    BNP [[id number]]  (Abnormal) Collected:  01/10/17 1803    Specimen:  Blood Updated:  01/11/17 0124     BNP 1018.0 (H) pg/mL     CBC & Differential [[id number]] Collected:  01/11/17 0558    Specimen:  Blood Updated:  01/11/17 4413    Narrative:       The following orders were created for panel order CBC & Differential.  Procedure                               Abnormality         Status                     ---------                               -----------          ------                     CBC Auto Differential[[id number]]         Abnormal            Final result                 Please view results for these tests on the individual orders.    CBC Auto Differential [[id number]]  (Abnormal) Collected:  01/11/17 0558    Specimen:  Blood Updated:  01/11/17 0753     WBC 15.03 (H) 10*3/mm3      RBC 3.76 (L) 10*6/mm3      Hemoglobin 9.2 (L) g/dL      Hematocrit 31.2 (L) %      MCV 83.0 fL      MCH 24.5 (L) pg      MCHC 29.5 (L) g/dL      RDW 20.0 (H) %      RDW-SD 61.8 (H) fl      MPV 10.4 fL      Platelets 248 10*3/mm3      Neutrophil % 85.4 (H) %      Lymphocyte % 6.1 (L) %      Monocyte % 5.8 %      Eosinophil % 2.1 %      Basophil % 0.3 %      Immature Grans % 0.3 %      Neutrophils, Absolute 12.85 (H) 10*3/mm3      Lymphocytes, Absolute 0.92 10*3/mm3      Monocytes, Absolute 0.87 10*3/mm3      Eosinophils, Absolute 0.31 (H) 10*3/mm3      Basophils, Absolute 0.04 10*3/mm3      Immature Grans, Absolute 0.04 (H) 10*3/mm3     Basic Metabolic Panel [[id number]]  (Abnormal) Collected:  01/11/17 0558    Specimen:  Blood Updated:  01/11/17 0848     Glucose 73 mg/dL      BUN 21 mg/dL      Creatinine 0.70 mg/dL      Sodium 136 mmol/L      Potassium 4.0 mmol/L      Chloride 105 mmol/L      CO2 22.0 mmol/L      Calcium 7.6 (L) mg/dL      eGFR Non African Amer 80 mL/min/1.73      BUN/Creatinine Ratio 30.0 (H)      Anion Gap 9.0 mmol/L     Narrative:       National Kidney Foundation Guidelines    Stage                           Description                             GFR                      1                               Normal or High                          90+  2                               Mild decrease                            60-89  3                               Moderate decrease                   30-59  4                               Severe decrease                       15-29  5                               Kidney failure                             <15    Influenza A & B, RT  PCR [[id number]]  (Normal) Collected:  01/11/17 2038    Specimen:  Swab from Nasopharynx Updated:  01/11/17 2219     Influenza A PCR Not Detected      Influenza B PCR Not Detected     CBC & Differential [[id number]] Collected:  01/12/17 0704    Specimen:  Blood Updated:  01/12/17 0800    Narrative:       The following orders were created for panel order CBC & Differential.  Procedure                               Abnormality         Status                     ---------                               -----------         ------                     CBC Auto Differential[[id number]]         Abnormal            Final result                 Please view results for these tests on the individual orders.    CBC Auto Differential [[id number]]  (Abnormal) Collected:  01/12/17 0704    Specimen:  Blood Updated:  01/12/17 0800     WBC 10.79 10*3/mm3      RBC 3.89 10*6/mm3      Hemoglobin 9.7 (L) g/dL      Hematocrit 32.0 (L) %      MCV 82.3 fL      MCH 24.9 (L) pg      MCHC 30.3 (L) g/dL      RDW 20.0 (H) %      RDW-SD 60.7 (H) fl      MPV 9.8 fL      Platelets 271 10*3/mm3      Neutrophil % 80.4 (H) %      Lymphocyte % 7.0 (L) %      Monocyte % 6.0 %      Eosinophil % 6.0 (H) %      Basophil % 0.2 %      Immature Grans % 0.4 %      Neutrophils, Absolute 8.67 (H) 10*3/mm3      Lymphocytes, Absolute 0.76 10*3/mm3      Monocytes, Absolute 0.65 10*3/mm3      Eosinophils, Absolute 0.65 (H) 10*3/mm3      Basophils, Absolute 0.02 10*3/mm3      Immature Grans, Absolute 0.04 (H) 10*3/mm3     Basic Metabolic Panel [[id number]]  (Abnormal) Collected:  01/12/17 0704    Specimen:  Blood Updated:  01/12/17 0808     Glucose 72 mg/dL      BUN 18 mg/dL      Creatinine 0.70 mg/dL      Sodium 138 mmol/L      Potassium 3.8 mmol/L      Chloride 105 mmol/L      CO2 26.0 mmol/L      Calcium 7.9 (L) mg/dL      eGFR Non African Amer 80 mL/min/1.73      BUN/Creatinine Ratio 25.7 (H)      Anion Gap 7.0 mmol/L     Narrative:       National Kidney Foundation  Guidelines    Stage                           Description                             GFR                      1                               Normal or High                          90+  2                               Mild decrease                            60-89  3                               Moderate decrease                   30-59  4                               Severe decrease                       15-29  5                               Kidney failure                             <15    Blood Culture [[id number]]  (Normal) Collected:  01/10/17 1803    Specimen:  Blood from Hand, Right Updated:  01/12/17 1901     Blood Culture No growth at 2 days     Blood Culture [[id number]]  (Normal) Collected:  01/10/17 1804    Specimen:  Blood from Arm, Left Updated:  01/12/17 1901     Blood Culture No growth at 2 days     Vancomycin, Trough [[id number]]  (Abnormal) Collected:  01/12/17 2028    Specimen:  Blood Updated:  01/12/17 2102     Vancomycin Trough 8.70 (L) mcg/mL     Urine Culture [[id number]]  (Abnormal) Collected:  01/10/17 1900    Specimen:  Urine from Urine, Catheter Updated:  01/13/17 0746     Urine Culture 70,000-80,000 CFU/mL Yeast isolated (A)     CBC (No Diff) [[id number]]  (Abnormal) Collected:  01/13/17 0700    Specimen:  Blood Updated:  01/13/17 0752     WBC 7.93 10*3/mm3      RBC 4.01 10*6/mm3      Hemoglobin 9.8 (L) g/dL      Hematocrit 33.1 (L) %      MCV 82.5 fL      MCH 24.4 (L) pg      MCHC 29.6 (L) g/dL      RDW 19.6 (H) %      RDW-SD 60.5 (H) fl      MPV 10.4 fL      Platelets 311 10*3/mm3         Imaging Results (all)     Procedure Component Value Units Date/Time    XR Chest 2 View [[id number]] Collected:  01/11/17 0933     Updated:  01/11/17 1254    Narrative:       EXAMINATION: XR CHEST 2 VIEWS-01/10/2017:      INDICATION: Hypoxia, fever.      COMPARISON: NONE     FINDINGS:   1.  Cardiomegaly is noted. There is COPD and diffuse interstitial  fibrosis with granulomatous scarring.  2.  Superimposed  upon these chronic senile changes, there is no evidence  of consolidation, edema or free fluid.  3.  There is marked dorsal arthropathy and there is severe right  shoulder arthropathy.           Impression:       1.  Cardiomegaly is noted with mild central vascular prominence.  Background pattern of significant interstitial and granulomatous  scarring is noted.  2.  Active disease, consolidation, free fluid or edema is otherwise not  identified.     D:  01/10/2017  E:  01/11/2017     This report was finalized on 1/11/2017 12:52 PM by Dr. Chi Talley MD.       CT Chest Without Contrast [[id number]] Collected:  01/12/17 1256     Updated:  01/12/17 1447    Narrative:       EXAMINATION: CT CHEST WITHOUT CONTRAST-01/11/2017:      INDICATION: Fever, cough, hypoxia; possible aspiration;; N39.0-Urinary  tract infection, site not specified; A41.9-Sepsis, unspecified organism.          TECHNIQUE:  CT data set of the chest and mediastinum was performed  without intravenous contrast.     The radiation dose reduction device was turned on for each scan per the  ALARA (As Low as Reasonably Achievable) protocol.     COMPARISON: NONE     FINDINGS:   1. Reactive-type lymph nodes are seen in the superior and mid  mediastinum. The cardiac chambers are borderline enlarged without  pericardial effusion. There is a large hiatus hernia with partial  intrathoracic stomach in the lower chest.  2. There are reactive lymph nodes in the axillary areas bilaterally  without bulky mass. Thoracic inlet is otherwise unremarkable. The images  into the upper abdomen are nonrevealing.  3. Extended window data sets demonstrate obstructive lung disease and  patchy areas of substantial fibrotic opacities. Linear nodular  coalescent opacities with mild groundglass are seen in the superior  segment right lower lobe and in the basilar segments bilaterally with  secondary pulmonary fibrosis and substantial fibrotic involvement of the  peripheral secondary  "pulmonary lobular septations. Trace effusions are  seen at both lung bases and there is bronchiectasis.       Impression:       1.  Cardiomegaly without pericardial effusion.  2.  Marked background pattern of obstructive lung disease, centrilobular  emphysema and pulmonary fibrosis with involvement of the secondary  pulmonary lobular septations.  3.  Ill defined nodular groundglass opacity posterior segment right  upper lobe and superior segment right lower lobe with mild consolidative  opacities at both bases and trace effusions.  4.  Marked esophagomegaly is noted with fluid in the esophagus and there  is a large hiatus hernia in the lower chest with partial intrathoracic  stomach.     D:  01/12/2017  E:  01/12/2017           This report was finalized on 1/12/2017 2:45 PM by Dr. Chi Talley MD.             Physical Exam on Discharge:    Visit Vitals   • /72 (BP Location: Right arm, Patient Position: Lying)   • Pulse 67   • Temp 97.8 °F (36.6 °C) (Oral)   • Resp 16   • Ht 59\" (149.9 cm)   • Wt 115 lb 11.9 oz (52.5 kg)   • SpO2 (!) 87%   • BMI 23.38 kg/m2     Gen-no acute distress  CV-RRR, S1 S2 normal, no m/r/g  Resp-CTAB, no wheezes  Abd-soft, NT, ND, +BS  Ext-no edema  Neuro-A&Ox3, no focal deficits  Psych-appropriate mood      Discharge Disposition  Skilled Nursing Facility (DC - External)    Discharge Medications   Ne[facility] Burk   Home Medication Instructions YAEL:[id number]    Printed on:01/13/17 124   Medication Information                      amoxicillin-clavulanate (AUGMENTIN) 875-125 MG per tablet  Take 1 tablet by mouth Every 12 (Twelve) Hours for 4 days. Indications: Pneumonia             aspirin 81 MG chewable tablet  Chew 81 mg Daily.             atorvastatin (LIPITOR) 20 MG tablet  Take 20 mg by mouth Every Night.             baclofen (LIORESAL) 10 MG tablet  Take 10 mg by mouth Every Night.             cholecalciferol (VITAMIN D3) 1000 UNITS tablet  Take 1,000 Units by mouth Daily.      "        cyclobenzaprine (FLEXERIL) 10 MG tablet  Take 10 mg by mouth 3 (Three) Times a Day As Needed for muscle spasms.             DULoxetine (CYMBALTA) 60 MG capsule  Take 60 mg by mouth Daily.             fluconazole (DIFLUCAN) 150 MG tablet  Take 1 tablet by mouth Daily for 4 days. Indications: Infection of Blood or Tissues affecting the Whole Body, Urinary Tract Infection             folic acid (FOLVITE) 1 MG tablet  Take 1 mg by mouth Daily. Except on day take methotrexate             furosemide (LASIX) 40 MG tablet  Take 40 mg by mouth Daily.             gabapentin (NEURONTIN) 600 MG tablet  Take 600 mg by mouth Every Night.             HYDROcodone-acetaminophen (NORCO) 7.5-325 MG per tablet  Take 1 tablet by mouth Every 6 (Six) Hours As Needed for moderate pain (4-6).             hydroxychloroquine (PLAQUENIL) 200 MG tablet  Take 200 mg by mouth Daily.             levothyroxine (SYNTHROID, LEVOTHROID) 125 MCG tablet  Take 125 mcg by mouth Daily.             Multiple Vitamins-Minerals (CENTRUM SILVER ULTRA WOMENS PO)  Take 1 tablet by mouth Daily.             Nutritional Supplements (ARGINAID) pack  Take 1 packet by mouth 2 (Two) Times a Day.             pantoprazole (PROTONIX) 40 MG EC tablet  Take 40 mg by mouth Daily.             polyethylene glycol (MIRALAX) packet  Take 17 g by mouth Every Other Day.             potassium chloride (K-DUR) 10 MEQ CR tablet  Take 10 mEq by mouth Daily.             pramipexole (MIRAPEX) 1 MG tablet  Take 1 mg by mouth Daily.                 Discharge Diet   Diet Instructions     Advance Diet As Tolerated                     Activity at Discharge  Activity Instructions     Activity as Tolerated                     Follow-up Appointments  No future appointments.  Referrals and Follow-ups to Schedule     Follow-Up    As directed    PCP in 1 week                 Test Results Pending at Discharge   Order Current Status    Blood Culture Preliminary result    Blood Culture  Preliminary result    Urine Culture Preliminary result          CC to: Sergio Garcia MD    Time: Discharge 45 min

## 2017-01-13 NOTE — DISCHARGE PLACEMENT REQUEST
"Bhargavi Luis DIAZ (83 y.o. Female)     Tiffany Ziegler, RN Case Manager [phone]      Date of Birth Social Security Number Address Home Phone MRN    11/23/1933  [address] [place] [id number] [phone] [id number]    [facility] Marital Status          Rastafarian        Admission Date Admission Type Admitting Provider Attending Provider Department, Room/Bed    1/10/17 Emergency Charlotte Peace MD Reddy, Mayuri V, MD [address], S451/1    Discharge Date Discharge Disposition Discharge Destination         Skilled Nursing Facility (DC - External)             Attending Provider: Charlotte WILDE MD     Allergies:  Codeine, Lortab [Hydrocodone-acetaminophen]    Isolation:  None   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)   Code Status:  FULL    Ht:  59\" (149.9 cm)   Wt:  115 lb 11.9 oz (52.5 kg)    Admission Cmt:  None   Principal Problem:  Sepsis [A41.9]                 Active Insurance as of 1/10/2017     Primary Coverage     Payor Plan Insurance Group Employer/Plan Group    MEDICARE MEDICARE A & B      Payor Plan Address Payor Plan Phone Number Effective From Effective To    [address] [phone] 11/1/1998     [place] [address]       Subscriber Name Subscriber Birth Date Member ID       LUIS VARGAS 11/23/1933 [id number]           Secondary Coverage     Payor Plan Insurance Group Employer/Plan Group    [facility] SUPP [id number]     Payor Plan Address Payor Plan Phone Number Effective From Effective To    [address]  1/1/2013     [place] [address]       Subscriber Name Subscriber Birth Date Member ID       LUIS VARGAS 11/23/1933 [id number]                 Emergency Contacts      (Rel.) Home Phone Work Phone Mobile Phone    MarathonHu dunlap (Son) -- -- [phone]    Sheyla Lara (Care Giver) [phone] -- --               Discharge Summary      Charlotte WILDE MD at 1/13/2017 12:48 PM                HOSPITAL MEDICINE DISCHARGE SUMMARY    Date " of Admission: 1/10/2017  Date of Discharge:  1/13/2017    Discharge Diagnoses:  Principal Problem:    Sepsis  Active Problems:    UTI (urinary tract infection)    Hypotension    Leukocytosis    Acute respiratory failure with hypoxia    Aspiration pneumonia    Chronic a-fib    GERD (gastroesophageal reflux disease)      Presenting Problem/History of Present Illness  Acute UTI [N39.0]  Patient is a 83 y.o. female presented from the [facility] due to low blood pressure, cough, and congestion.     Discharge Day HPI:  Patient seen this morning. Some mild confusion when she first woke up but has improved as day went on. Caretaker at bedside. Patient feels well and has no complaints. Wants to go home.     ROS:  Gen-no fevers, chills  CV-no chest pain, palpitations  Resp-improved cough, no dyspnea  GI-no N/V/D, abd pain    Hospital Course  Patient admitted to hospitalist service. BP improved with IV fluids. CXR initially was not very revealing, but CT chest was done and did show consolidative opacities at the bases bilaterally and in the RUL/RLL most likely pneumonia. Her urine culture grew 70,000-80,000 yeast. She was initially on broad spectrum ATBx with Vanc, Zosyn, as well as Diflucan. Infectious Disease was consulted and followed along. She continued to improve. Patient was felt most likely to have aspiration PNA. She had speech team evaluate her and they recommended regular diet and thin liquids and reflux precautions, no further therapy was indicated. ID recommended 4 more days of Augmentin and Diflucan to complete her treatment course. She has reached maximal benefit of inpatient hospitalization and will be discharged back to the [facility] today. She has been intermittently requiring minimal supplemental O2 here, and this can be continued at the [facility] and efforts should be made to wean her back to room air while she completes treatment for pneumonia.     Procedures Performed         Consults:   Consults     Date and  Time Order Name Status Description    1/10/2017 2350 Inpatient Consult to Infectious Diseases            Pertinent Test Results:   Lab Results (last 7 days)     Procedure Component Value Units Date/Time    CBC & Differential [[id number]] Collected:  01/10/17 1803    Specimen:  Blood Updated:  01/10/17 1849    Narrative:       The following orders were created for panel order CBC & Differential.  Procedure                               Abnormality         Status                     ---------                               -----------         ------                     CBC Auto Differential[[id number]]         Abnormal            Final result                 Please view results for these tests on the individual orders.    CBC Auto Differential [[id number]]  (Abnormal) Collected:  01/10/17 1803    Specimen:  Blood Updated:  01/10/17 1849     WBC 29.32 (H) 10*3/mm3      RBC 3.96 10*6/mm3      Hemoglobin 10.1 (L) g/dL      Hematocrit 32.1 (L) %      MCV 81.1 fL      MCH 25.5 (L) pg      MCHC 31.5 (L) g/dL      RDW 20.1 (H) %      RDW-SD 60.4 (H) fl      MPV 10.4 fL      Platelets 284 10*3/mm3      Neutrophil % 91.6 (H) %      Lymphocyte % 3.1 (L) %      Monocyte % 4.7 %      Eosinophil % 0.0 %      Basophil % 0.1 %      Immature Grans % 0.5 %      Neutrophils, Absolute 26.82 (H) 10*3/mm3      Lymphocytes, Absolute 0.90 10*3/mm3      Monocytes, Absolute 1.39 (H) 10*3/mm3      Eosinophils, Absolute 0.01 (L) 10*3/mm3      Basophils, Absolute 0.04 10*3/mm3      Immature Grans, Absolute 0.16 (H) 10*3/mm3     Procalcitonin [[id number]] Collected:  01/10/17 1803    Specimen:  Blood Updated:  01/10/17 1854     Procalcitonin 2.86 ng/mL     Narrative:       As a Marker for Sepsis (Non-Neonates):   1. <0.5 ng/mL represents a low risk of severe sepsis and/or septic shock.  2. >2 ng/mL represents a high risk of severe sepsis and/or septic shock.    As a Marker for Lower Respiratory Tract Infections that require antibiotic therapy:    PCT on  Admission     Antibiotic Therapy       6-12 Hrs later  > 0.5                Strongly Recommended             >0.25 - <0.5         Recommended  0.1 - 0.25           Discouraged              Remeasure/reassess PCT  <0.1                 Strongly Discouraged     Remeasure/reassess PCT                     PCT values of < 0.5 ng/mL do not exclude an infection, because localized infections (without systemic signs) may be associated with such low concentrations, or a systemic infection in its initial stages (< 6 hours). Furthermore, increased PCT can occur without infection. PCT concentrations between 0.5 and 2.0 ng/mL should be interpreted taking into account the patient's history. It is recommended to retest PCT within 6-24 hours if any concentrations < 2 ng/mL are obtained.    Lactic Acid, Plasma [[id number]]  (Normal) Collected:  01/10/17 1803    Specimen:  Blood Updated:  01/10/17 1857     Lactate 1.3 mmol/L       Falsely depressed results may occur on samples drawn from patients receiving N-Acetylcysteine (NAC) or Metamizole.       Comprehensive Metabolic Panel [[id number]]  (Abnormal) Collected:  01/10/17 1803    Specimen:  Blood Updated:  01/10/17 1911     Glucose 97 mg/dL      BUN 26 (H) mg/dL      Creatinine 0.80 mg/dL      Sodium 136 mmol/L      Potassium 4.5 mmol/L      Chloride 98 (L) mmol/L      CO2 27.0 mmol/L      Calcium 8.3 (L) mg/dL      Total Protein 5.7 g/dL      Albumin 3.30 g/dL      ALT (SGPT) 24 U/L      AST (SGOT) 31 U/L      Alkaline Phosphatase 151 (H) U/L      Total Bilirubin 0.4 mg/dL      eGFR Non African Amer 69 mL/min/1.73      Globulin 2.4 gm/dL      A/G Ratio 1.4 (L) g/dL      BUN/Creatinine Ratio 32.5 (H)      Anion Gap 11.0 mmol/L     Narrative:       National Kidney Foundation Guidelines    Stage                           Description                             GFR                      1                               Normal or High                          90+  2                                Mild decrease                            60-89  3                               Moderate decrease                   30-59  4                               Severe decrease                       15-29  5                               Kidney failure                             <15    Urinalysis With / Culture If Indicated [[id number]]  (Abnormal) Collected:  01/10/17 1900    Specimen:  Urine from Urine, Catheter Updated:  01/10/17 1958     Color, UA Yellow      Appearance, UA Clear      pH, UA 6.0      Specific Gravity, UA 1.009      Glucose, UA Negative      Ketones, UA Negative      Bilirubin, UA Negative      Blood, UA Negative      Protein, UA Negative      Leuk Esterase, UA Moderate (2+) (A)      Nitrite, UA Negative      Urobilinogen, UA 0.2 E.U./dL     Urinalysis, Microscopic Only [[id number]]  (Abnormal) Collected:  01/10/17 1900    Specimen:  Urine from Urine, Catheter Updated:  01/10/17 1958     RBC, UA 0-2 /HPF      WBC, UA 21-30 (A) /HPF      Bacteria, UA None Seen /HPF      Squamous Epithelial Cells, UA 3-6 (A) /HPF      Yeast, UA Moderate/2+ Budding Yeast /HPF      Hyaline Casts, UA 0-6 /LPF      Methodology Manual Light Microscopy     Protime-INR [[id number]]  (Abnormal) Collected:  01/10/17 1946    Specimen:  Blood Updated:  01/10/17 2040     Protime 13.2 (H) Seconds      INR 1.20     Narrative:       Therapeutic Ranges for INR: 2.0-3.0 (PT 20-30)                              2.5-3.5 (PT 25-34)    BNP [[id number]]  (Abnormal) Collected:  01/10/17 1803    Specimen:  Blood Updated:  01/11/17 0124     BNP 1018.0 (H) pg/mL     CBC & Differential [[id number]] Collected:  01/11/17 0558    Specimen:  Blood Updated:  01/11/17 0753    Narrative:       The following orders were created for panel order CBC & Differential.  Procedure                               Abnormality         Status                     ---------                               -----------         ------                     CBC Auto  Differential[[id number]]         Abnormal            Final result                 Please view results for these tests on the individual orders.    CBC Auto Differential [[id number]]  (Abnormal) Collected:  01/11/17 0558    Specimen:  Blood Updated:  01/11/17 0753     WBC 15.03 (H) 10*3/mm3      RBC 3.76 (L) 10*6/mm3      Hemoglobin 9.2 (L) g/dL      Hematocrit 31.2 (L) %      MCV 83.0 fL      MCH 24.5 (L) pg      MCHC 29.5 (L) g/dL      RDW 20.0 (H) %      RDW-SD 61.8 (H) fl      MPV 10.4 fL      Platelets 248 10*3/mm3      Neutrophil % 85.4 (H) %      Lymphocyte % 6.1 (L) %      Monocyte % 5.8 %      Eosinophil % 2.1 %      Basophil % 0.3 %      Immature Grans % 0.3 %      Neutrophils, Absolute 12.85 (H) 10*3/mm3      Lymphocytes, Absolute 0.92 10*3/mm3      Monocytes, Absolute 0.87 10*3/mm3      Eosinophils, Absolute 0.31 (H) 10*3/mm3      Basophils, Absolute 0.04 10*3/mm3      Immature Grans, Absolute 0.04 (H) 10*3/mm3     Basic Metabolic Panel [[id number]]  (Abnormal) Collected:  01/11/17 0558    Specimen:  Blood Updated:  01/11/17 0848     Glucose 73 mg/dL      BUN 21 mg/dL      Creatinine 0.70 mg/dL      Sodium 136 mmol/L      Potassium 4.0 mmol/L      Chloride 105 mmol/L      CO2 22.0 mmol/L      Calcium 7.6 (L) mg/dL      eGFR Non African Amer 80 mL/min/1.73      BUN/Creatinine Ratio 30.0 (H)      Anion Gap 9.0 mmol/L     Narrative:       National Kidney Foundation Guidelines    Stage                           Description                             GFR                      1                               Normal or High                          90+  2                               Mild decrease                            60-89  3                               Moderate decrease                   30-59  4                               Severe decrease                       15-29  5                               Kidney failure                             <15    Influenza A & B, RT PCR [[id number]]  (Normal) Collected:   01/11/17 2038    Specimen:  Swab from Nasopharynx Updated:  01/11/17 2219     Influenza A PCR Not Detected      Influenza B PCR Not Detected     CBC & Differential [[id number]] Collected:  01/12/17 0704    Specimen:  Blood Updated:  01/12/17 0800    Narrative:       The following orders were created for panel order CBC & Differential.  Procedure                               Abnormality         Status                     ---------                               -----------         ------                     CBC Auto Differential[[id number]]         Abnormal            Final result                 Please view results for these tests on the individual orders.    CBC Auto Differential [[id number]]  (Abnormal) Collected:  01/12/17 0704    Specimen:  Blood Updated:  01/12/17 0800     WBC 10.79 10*3/mm3      RBC 3.89 10*6/mm3      Hemoglobin 9.7 (L) g/dL      Hematocrit 32.0 (L) %      MCV 82.3 fL      MCH 24.9 (L) pg      MCHC 30.3 (L) g/dL      RDW 20.0 (H) %      RDW-SD 60.7 (H) fl      MPV 9.8 fL      Platelets 271 10*3/mm3      Neutrophil % 80.4 (H) %      Lymphocyte % 7.0 (L) %      Monocyte % 6.0 %      Eosinophil % 6.0 (H) %      Basophil % 0.2 %      Immature Grans % 0.4 %      Neutrophils, Absolute 8.67 (H) 10*3/mm3      Lymphocytes, Absolute 0.76 10*3/mm3      Monocytes, Absolute 0.65 10*3/mm3      Eosinophils, Absolute 0.65 (H) 10*3/mm3      Basophils, Absolute 0.02 10*3/mm3      Immature Grans, Absolute 0.04 (H) 10*3/mm3     Basic Metabolic Panel [[id number]]  (Abnormal) Collected:  01/12/17 0704    Specimen:  Blood Updated:  01/12/17 0808     Glucose 72 mg/dL      BUN 18 mg/dL      Creatinine 0.70 mg/dL      Sodium 138 mmol/L      Potassium 3.8 mmol/L      Chloride 105 mmol/L      CO2 26.0 mmol/L      Calcium 7.9 (L) mg/dL      eGFR Non African Amer 80 mL/min/1.73      BUN/Creatinine Ratio 25.7 (H)      Anion Gap 7.0 mmol/L     Narrative:       National Kidney Foundation Guidelines    Stage                            Description                             GFR                      1                               Normal or High                          90+  2                               Mild decrease                            60-89  3                               Moderate decrease                   30-59  4                               Severe decrease                       15-29  5                               Kidney failure                             <15    Blood Culture [[id number]]  (Normal) Collected:  01/10/17 1803    Specimen:  Blood from Hand, Right Updated:  01/12/17 1901     Blood Culture No growth at 2 days     Blood Culture [[id number]]  (Normal) Collected:  01/10/17 1804    Specimen:  Blood from Arm, Left Updated:  01/12/17 1901     Blood Culture No growth at 2 days     Vancomycin, Trough [[id number]]  (Abnormal) Collected:  01/12/17 2028    Specimen:  Blood Updated:  01/12/17 2102     Vancomycin Trough 8.70 (L) mcg/mL     Urine Culture [[id number]]  (Abnormal) Collected:  01/10/17 1900    Specimen:  Urine from Urine, Catheter Updated:  01/13/17 0746     Urine Culture 70,000-80,000 CFU/mL Yeast isolated (A)     CBC (No Diff) [[id number]]  (Abnormal) Collected:  01/13/17 0700    Specimen:  Blood Updated:  01/13/17 0752     WBC 7.93 10*3/mm3      RBC 4.01 10*6/mm3      Hemoglobin 9.8 (L) g/dL      Hematocrit 33.1 (L) %      MCV 82.5 fL      MCH 24.4 (L) pg      MCHC 29.6 (L) g/dL      RDW 19.6 (H) %      RDW-SD 60.5 (H) fl      MPV 10.4 fL      Platelets 311 10*3/mm3         Imaging Results (all)     Procedure Component Value Units Date/Time    XR Chest 2 View [[id number]] Collected:  01/11/17 0933     Updated:  01/11/17 1254    Narrative:       EXAMINATION: XR CHEST 2 VIEWS-01/10/2017:      INDICATION: Hypoxia, fever.      COMPARISON: NONE     FINDINGS:   1.  Cardiomegaly is noted. There is COPD and diffuse interstitial  fibrosis with granulomatous scarring.  2.  Superimposed upon these chronic senile changes, there is no  evidence  of consolidation, edema or free fluid.  3.  There is marked dorsal arthropathy and there is severe right  shoulder arthropathy.           Impression:       1.  Cardiomegaly is noted with mild central vascular prominence.  Background pattern of significant interstitial and granulomatous  scarring is noted.  2.  Active disease, consolidation, free fluid or edema is otherwise not  identified.     D:  01/10/2017  E:  01/11/2017     This report was finalized on 1/11/2017 12:52 PM by Dr. Chi Talley MD.       CT Chest Without Contrast [[id number]] Collected:  01/12/17 1256     Updated:  01/12/17 1447    Narrative:       EXAMINATION: CT CHEST WITHOUT CONTRAST-01/11/2017:      INDICATION: Fever, cough, hypoxia; possible aspiration;; N39.0-Urinary  tract infection, site not specified; A41.9-Sepsis, unspecified organism.          TECHNIQUE:  CT data set of the chest and mediastinum was performed  without intravenous contrast.     The radiation dose reduction device was turned on for each scan per the  ALARA (As Low as Reasonably Achievable) protocol.     COMPARISON: NONE     FINDINGS:   1. Reactive-type lymph nodes are seen in the superior and mid  mediastinum. The cardiac chambers are borderline enlarged without  pericardial effusion. There is a large hiatus hernia with partial  intrathoracic stomach in the lower chest.  2. There are reactive lymph nodes in the axillary areas bilaterally  without bulky mass. Thoracic inlet is otherwise unremarkable. The images  into the upper abdomen are nonrevealing.  3. Extended window data sets demonstrate obstructive lung disease and  patchy areas of substantial fibrotic opacities. Linear nodular  coalescent opacities with mild groundglass are seen in the superior  segment right lower lobe and in the basilar segments bilaterally with  secondary pulmonary fibrosis and substantial fibrotic involvement of the  peripheral secondary pulmonary lobular septations. Trace effusions  "are  seen at both lung bases and there is bronchiectasis.       Impression:       1.  Cardiomegaly without pericardial effusion.  2.  Marked background pattern of obstructive lung disease, centrilobular  emphysema and pulmonary fibrosis with involvement of the secondary  pulmonary lobular septations.  3.  Ill defined nodular groundglass opacity posterior segment right  upper lobe and superior segment right lower lobe with mild consolidative  opacities at both bases and trace effusions.  4.  Marked esophagomegaly is noted with fluid in the esophagus and there  is a large hiatus hernia in the lower chest with partial intrathoracic  stomach.     D:  01/12/2017  E:  01/12/2017           This report was finalized on 1/12/2017 2:45 PM by Dr. Chi Talley MD.             Physical Exam on Discharge:    Visit Vitals   • /72 (BP Location: Right arm, Patient Position: Lying)   • Pulse 67   • Temp 97.8 °F (36.6 °C) (Oral)   • Resp 16   • Ht 59\" (149.9 cm)   • Wt 115 lb 11.9 oz (52.5 kg)   • SpO2 (!) 87%   • BMI 23.38 kg/m2     Gen-no acute distress  CV-RRR, S1 S2 normal, no m/r/g  Resp-CTAB, no wheezes  Abd-soft, NT, ND, +BS  Ext-no edema  Neuro-A&Ox3, no focal deficits  Psych-appropriate mood      Discharge Disposition  Skilled Nursing Facility (DC - External)    Discharge Medications   Neida Burk   Home Medication Instructions YAEL:[id number]    Printed on:01/13/17 1245   Medication Information                      amoxicillin-clavulanate (AUGMENTIN) 875-125 MG per tablet  Take 1 tablet by mouth Every 12 (Twelve) Hours for 4 days. Indications: Pneumonia             aspirin 81 MG chewable tablet  Chew 81 mg Daily.             atorvastatin (LIPITOR) 20 MG tablet  Take 20 mg by mouth Every Night.             baclofen (LIORESAL) 10 MG tablet  Take 10 mg by mouth Every Night.             cholecalciferol (VITAMIN D3) 1000 UNITS tablet  Take 1,000 Units by mouth Daily.             cyclobenzaprine (FLEXERIL) 10 MG " tablet  Take 10 mg by mouth 3 (Three) Times a Day As Needed for muscle spasms.             DULoxetine (CYMBALTA) 60 MG capsule  Take 60 mg by mouth Daily.             fluconazole (DIFLUCAN) 150 MG tablet  Take 1 tablet by mouth Daily for 4 days. Indications: Infection of Blood or Tissues affecting the Whole Body, Urinary Tract Infection             folic acid (FOLVITE) 1 MG tablet  Take 1 mg by mouth Daily. Except on day take methotrexate             furosemide (LASIX) 40 MG tablet  Take 40 mg by mouth Daily.             gabapentin (NEURONTIN) 600 MG tablet  Take 600 mg by mouth Every Night.             HYDROcodone-acetaminophen (NORCO) 7.5-325 MG per tablet  Take 1 tablet by mouth Every 6 (Six) Hours As Needed for moderate pain (4-6).             hydroxychloroquine (PLAQUENIL) 200 MG tablet  Take 200 mg by mouth Daily.             levothyroxine (SYNTHROID, LEVOTHROID) 125 MCG tablet  Take 125 mcg by mouth Daily.             Multiple Vitamins-Minerals (CENTRUM SILVER ULTRA WOMENS PO)  Take 1 tablet by mouth Daily.             Nutritional Supplements (ARGINAID) pack  Take 1 packet by mouth 2 (Two) Times a Day.             pantoprazole (PROTONIX) 40 MG EC tablet  Take 40 mg by mouth Daily.             polyethylene glycol (MIRALAX) packet  Take 17 g by mouth Every Other Day.             potassium chloride (K-DUR) 10 MEQ CR tablet  Take 10 mEq by mouth Daily.             pramipexole (MIRAPEX) 1 MG tablet  Take 1 mg by mouth Daily.                 Discharge Diet   Diet Instructions     Advance Diet As Tolerated                     Activity at Discharge  Activity Instructions     Activity as Tolerated                     Follow-up Appointments  No future appointments.  Referrals and Follow-ups to Schedule     Follow-Up    As directed    PCP in 1 week                 Test Results Pending at Discharge   Order Current Status    Blood Culture Preliminary result    Blood Culture Preliminary result    Urine Culture Preliminary  result          CC to: Sergio Garcia MD    Time: Discharge 45 min       Electronically signed by Charlotte WILDE MD at 1/13/2017  1:01 PM

## 2017-01-13 NOTE — PROGRESS NOTES
Continued Stay Note  [facility]     Patient Name: Neida Burk  MRN: [id number]  Today's Date: 1/13/2017    Admit Date: 1/10/2017          Discharge Plan       01/13/17 1349    Case Management/Social Work Plan    Plan [facility]    Patient/Family In Agreement With Plan yes    Additional Comments Per Maryan at [facility], they are able to accept patient today if medically ready. DC summary has been faxed to [phone], nurse to call report to [phone]. Per son, patient's caregiver will be providing transportation. CM will follow.               Discharge Codes     None        Expected Discharge Date and Time     Expected Discharge Date Expected Discharge Time    Jan 13, 2017             Tiffany Ziegler RN

## 2017-01-15 LAB
BACTERIA SPEC AEROBE CULT: NORMAL
BACTERIA SPEC AEROBE CULT: NORMAL

## 2017-03-20 ENCOUNTER — HOSPITAL ENCOUNTER (INPATIENT)
Facility: HOSPITAL | Age: 82
LOS: 16 days | Discharge: HOSPICE | End: 2017-04-05
Attending: INTERNAL MEDICINE | Admitting: INTERNAL MEDICINE

## 2017-03-20 DIAGNOSIS — Z74.09 IMPAIRED FUNCTIONAL MOBILITY, BALANCE, GAIT, AND ENDURANCE: ICD-10-CM

## 2017-03-20 DIAGNOSIS — I51.81 TAKOTSUBO CARDIOMYOPATHY: ICD-10-CM

## 2017-03-20 DIAGNOSIS — R13.13 PHARYNGEAL DYSPHAGIA: Primary | ICD-10-CM

## 2017-03-20 PROCEDURE — 94799 UNLISTED PULMONARY SVC/PX: CPT

## 2017-03-20 PROCEDURE — 94002 VENT MGMT INPAT INIT DAY: CPT

## 2017-03-21 ENCOUNTER — APPOINTMENT (OUTPATIENT)
Dept: GENERAL RADIOLOGY | Facility: HOSPITAL | Age: 82
End: 2017-03-21

## 2017-03-21 ENCOUNTER — APPOINTMENT (OUTPATIENT)
Dept: CARDIOLOGY | Facility: HOSPITAL | Age: 82
End: 2017-03-21

## 2017-03-21 ENCOUNTER — APPOINTMENT (OUTPATIENT)
Dept: CT IMAGING | Facility: HOSPITAL | Age: 82
End: 2017-03-21

## 2017-03-21 PROBLEM — S22.22XA CLOSED FRACTURE OF BODY OF STERNUM: Status: ACTIVE | Noted: 2017-03-21

## 2017-03-21 PROBLEM — J81.0 ACUTE PULMONARY EDEMA (HCC): Status: ACTIVE | Noted: 2017-03-21

## 2017-03-21 PROBLEM — I46.9 CARDIAC ARREST (HCC): Status: ACTIVE | Noted: 2017-03-21

## 2017-03-21 PROBLEM — R57.9 SHOCK (HCC): Status: ACTIVE | Noted: 2017-03-21

## 2017-03-21 PROBLEM — I51.81 TAKOTSUBO CARDIOMYOPATHY: Status: ACTIVE | Noted: 2017-03-21

## 2017-03-21 PROBLEM — I47.20 VENTRICULAR TACHYARRHYTHMIA (HCC): Status: ACTIVE | Noted: 2017-03-21

## 2017-03-21 LAB
ABO GROUP BLD: NORMAL
ALBUMIN SERPL-MCNC: 2.8 G/DL (ref 3.2–4.8)
ALBUMIN SERPL-MCNC: 3 G/DL (ref 3.2–4.8)
ALBUMIN/GLOB SERPL: 1.2 G/DL (ref 1.5–2.5)
ALBUMIN/GLOB SERPL: 1.3 G/DL (ref 1.5–2.5)
ALP SERPL-CCNC: 243 U/L (ref 25–100)
ALP SERPL-CCNC: 253 U/L (ref 25–100)
ALT SERPL W P-5'-P-CCNC: 1107 U/L (ref 7–40)
ALT SERPL W P-5'-P-CCNC: 1785 U/L (ref 7–40)
ANION GAP SERPL CALCULATED.3IONS-SCNC: 15 MMOL/L (ref 3–11)
ANION GAP SERPL CALCULATED.3IONS-SCNC: 17 MMOL/L (ref 3–11)
ANION GAP SERPL CALCULATED.3IONS-SCNC: 17 MMOL/L (ref 3–11)
ARTERIAL PATENCY WRIST A: ABNORMAL
AST SERPL-CCNC: 1333 U/L (ref 0–33)
AST SERPL-CCNC: 2237 U/L (ref 0–33)
ATMOSPHERIC PRESS: ABNORMAL MMHG
BACTERIA UR QL AUTO: ABNORMAL /HPF
BASE EXCESS BLDA CALC-SCNC: -16.9 MMOL/L (ref 0–2)
BASE EXCESS BLDA CALC-SCNC: -7.5 MMOL/L (ref 0–2)
BASE EXCESS BLDA CALC-SCNC: 5.5 MMOL/L (ref 0–2)
BASOPHILS # BLD AUTO: 0.04 10*3/MM3 (ref 0–0.2)
BASOPHILS # BLD AUTO: 0.05 10*3/MM3 (ref 0–0.2)
BASOPHILS # BLD AUTO: 0.06 10*3/MM3 (ref 0–0.2)
BASOPHILS NFR BLD AUTO: 0.2 % (ref 0–1)
BDY SITE: ABNORMAL
BH CV ECHO MEAS - AO ROOT DIAM: 2.9 CM
BH CV ECHO MEAS - AO V2 MAX: 87.6 CM/SEC
BH CV ECHO MEAS - AVA(I,D): 2.1 CM2
BH CV ECHO MEAS - BSA(HAYCOCK): 1.6 M^2
BH CV ECHO MEAS - BSA: 1.6 M^2
BH CV ECHO MEAS - BZI_BMI: 22.7 KILOGRAMS/M^2
BH CV ECHO MEAS - BZI_METRIC_HEIGHT: 162.6 CM
BH CV ECHO MEAS - BZI_METRIC_WEIGHT: 59.9 KG
BH CV ECHO MEAS - IVSD: 1 CM
BH CV ECHO MEAS - LA DIMENSION: 3.1 CM
BH CV ECHO MEAS - LAT PEAK E' VEL: 6.4 CM/SEC
BH CV ECHO MEAS - LV MAX PG: 1 MMHG
BH CV ECHO MEAS - LV MEAN PG: 1 MMHG
BH CV ECHO MEAS - LV V1 MAX: 58.5 CM/SEC
BH CV ECHO MEAS - LVIDD: 4.5 CM
BH CV ECHO MEAS - LVIDS: 3.2 CM
BH CV ECHO MEAS - LVOT DIAM: 2 CM
BH CV ECHO MEAS - LVPWD: 1 CM
BH CV ECHO MEAS - MED PEAK E' VEL: 3.62 CM/SEC
BH CV ECHO MEAS - MV E MAX VEL: 75.5 CM/SEC
BH CV ECHO MEAS - PA ACC SLOPE: 1 CM/SEC2
BH CV ECHO MEAS - RAP SYSTOLE: 8 MMHG
BH CV ECHO MEAS - RVSP: 40 MMHG
BH CV ECHO MEAS - TR MAX VEL: 281 CM/SEC
BH CV XLRA - RV BASE: 3.6 CM
BH CV XLRA - RV LENGTH: 5.6 CM
BH CV XLRA - RV MID: 2.6 CM
BILIRUB SERPL-MCNC: 0.9 MG/DL (ref 0.3–1.2)
BILIRUB SERPL-MCNC: 1.2 MG/DL (ref 0.3–1.2)
BILIRUB UR QL STRIP: ABNORMAL
BLD GP AB SCN SERPL QL: NEGATIVE
BUN BLD-MCNC: 25 MG/DL (ref 9–23)
BUN BLD-MCNC: 27 MG/DL (ref 9–23)
BUN BLD-MCNC: 28 MG/DL (ref 9–23)
BUN/CREAT SERPL: 22.5 (ref 7–25)
BUN/CREAT SERPL: 22.7 (ref 7–25)
BUN/CREAT SERPL: 25.5 (ref 7–25)
BURR CELLS BLD QL SMEAR: NORMAL
CA-I SERPL ISE-MCNC: 0.86 MMOL/L (ref 1.12–1.32)
CALCIUM SPEC-SCNC: 7.2 MG/DL (ref 8.7–10.4)
CALCIUM SPEC-SCNC: 7.3 MG/DL (ref 8.7–10.4)
CALCIUM SPEC-SCNC: 7.3 MG/DL (ref 8.7–10.4)
CHLORIDE SERPL-SCNC: 100 MMOL/L (ref 99–109)
CHLORIDE SERPL-SCNC: 102 MMOL/L (ref 99–109)
CHLORIDE SERPL-SCNC: 99 MMOL/L (ref 99–109)
CLARITY UR: ABNORMAL
CO2 BLDA-SCNC: 12.3 MMOL/L (ref 22–33)
CO2 BLDA-SCNC: 17.8 MMOL/L (ref 22–33)
CO2 BLDA-SCNC: 28.5 MMOL/L (ref 22–33)
CO2 SERPL-SCNC: 13 MMOL/L (ref 20–31)
CO2 SERPL-SCNC: 17 MMOL/L (ref 20–31)
CO2 SERPL-SCNC: 20 MMOL/L (ref 20–31)
COHGB MFR BLD: 1.6 % (ref 0–2)
COHGB MFR BLD: 1.6 % (ref 0–2)
COHGB MFR BLD: 1.8 % (ref 0–2)
COLOR UR: ABNORMAL
CORTIS SERPL-MCNC: 96.8 MCG/DL
CREAT BLD-MCNC: 1.1 MG/DL (ref 0.6–1.3)
CREAT BLD-MCNC: 1.1 MG/DL (ref 0.6–1.3)
CREAT BLD-MCNC: 1.2 MG/DL (ref 0.6–1.3)
D-LACTATE SERPL-SCNC: 8.2 MMOL/L (ref 0.5–2)
D-LACTATE SERPL-SCNC: 9.6 MMOL/L (ref 0.5–2)
D-LACTATE SERPL-SCNC: 9.9 MMOL/L (ref 0.5–2)
DEPRECATED RDW RBC AUTO: 50 FL (ref 37–54)
DEPRECATED RDW RBC AUTO: 51.1 FL (ref 37–54)
DEPRECATED RDW RBC AUTO: 51.6 FL (ref 37–54)
E/E' RATIO: 15.5
EOSINOPHIL # BLD AUTO: 0 10*3/MM3 (ref 0.1–0.3)
EOSINOPHIL NFR BLD AUTO: 0 % (ref 0–3)
ERYTHROCYTE [DISTWIDTH] IN BLOOD BY AUTOMATED COUNT: 17 % (ref 11.3–14.5)
ERYTHROCYTE [DISTWIDTH] IN BLOOD BY AUTOMATED COUNT: 17.1 % (ref 11.3–14.5)
ERYTHROCYTE [DISTWIDTH] IN BLOOD BY AUTOMATED COUNT: 17.3 % (ref 11.3–14.5)
GFR SERPL CREATININE-BSD FRML MDRD: 43 ML/MIN/1.73
GFR SERPL CREATININE-BSD FRML MDRD: 47 ML/MIN/1.73
GFR SERPL CREATININE-BSD FRML MDRD: 47 ML/MIN/1.73
GLOBULIN UR ELPH-MCNC: 2.3 GM/DL
GLOBULIN UR ELPH-MCNC: 2.4 GM/DL
GLUCOSE BLD-MCNC: 158 MG/DL (ref 70–100)
GLUCOSE BLD-MCNC: 163 MG/DL (ref 70–100)
GLUCOSE BLD-MCNC: 230 MG/DL (ref 70–100)
GLUCOSE BLDC GLUCOMTR-MCNC: 101 MG/DL (ref 70–130)
GLUCOSE BLDC GLUCOMTR-MCNC: 103 MG/DL (ref 70–130)
GLUCOSE BLDC GLUCOMTR-MCNC: 151 MG/DL (ref 70–130)
GLUCOSE UR STRIP-MCNC: NEGATIVE MG/DL
HCO3 BLDA-SCNC: 11.2 MMOL/L (ref 20–26)
HCO3 BLDA-SCNC: 16.9 MMOL/L (ref 20–26)
HCO3 BLDA-SCNC: 27.6 MMOL/L (ref 20–26)
HCT VFR BLD AUTO: 37.9 % (ref 34.5–44)
HCT VFR BLD AUTO: 38.2 % (ref 34.5–44)
HCT VFR BLD AUTO: 39.3 % (ref 34.5–44)
HCT VFR BLD CALC: 31 %
HCT VFR BLD CALC: 33.8 %
HCT VFR BLD CALC: 35.4 %
HGB BLD-MCNC: 11.4 G/DL (ref 11.5–15.5)
HGB BLD-MCNC: 11.5 G/DL (ref 11.5–15.5)
HGB BLD-MCNC: 11.7 G/DL (ref 11.5–15.5)
HGB BLDA-MCNC: 10.1 G/DL (ref 14–18)
HGB BLDA-MCNC: 11 G/DL (ref 14–18)
HGB BLDA-MCNC: 11.6 G/DL (ref 14–18)
HGB UR QL STRIP.AUTO: ABNORMAL
HOROWITZ INDEX BLD+IHG-RTO: 100 %
HOROWITZ INDEX BLD+IHG-RTO: 35 %
HOROWITZ INDEX BLD+IHG-RTO: 90 %
HYALINE CASTS UR QL AUTO: ABNORMAL /LPF
HYPOCHROMIA BLD QL: NORMAL
IMM GRANULOCYTES # BLD: 0.06 10*3/MM3 (ref 0–0.03)
IMM GRANULOCYTES # BLD: 0.13 10*3/MM3 (ref 0–0.03)
IMM GRANULOCYTES # BLD: 0.18 10*3/MM3 (ref 0–0.03)
IMM GRANULOCYTES NFR BLD: 0.2 % (ref 0–0.6)
IMM GRANULOCYTES NFR BLD: 0.5 % (ref 0–0.6)
IMM GRANULOCYTES NFR BLD: 0.6 % (ref 0–0.6)
INR PPP: 1.62
INR PPP: 1.64
KETONES UR QL STRIP: NEGATIVE
LEUKOCYTE ESTERASE UR QL STRIP.AUTO: NEGATIVE
LV EF 2D ECHO EST: 20 %
LYMPHOCYTES # BLD AUTO: 0.56 10*3/MM3 (ref 0.6–4.8)
LYMPHOCYTES # BLD AUTO: 0.61 10*3/MM3 (ref 0.6–4.8)
LYMPHOCYTES # BLD AUTO: 1.3 10*3/MM3 (ref 0.6–4.8)
LYMPHOCYTES NFR BLD AUTO: 2.1 % (ref 24–44)
LYMPHOCYTES NFR BLD AUTO: 2.3 % (ref 24–44)
LYMPHOCYTES NFR BLD AUTO: 4.6 % (ref 24–44)
MAGNESIUM SERPL-MCNC: 2.4 MG/DL (ref 1.3–2.7)
MAGNESIUM SERPL-MCNC: 2.6 MG/DL (ref 1.3–2.7)
MCH RBC QN AUTO: 24 PG (ref 27–31)
MCH RBC QN AUTO: 24.2 PG (ref 27–31)
MCH RBC QN AUTO: 24.4 PG (ref 27–31)
MCHC RBC AUTO-ENTMCNC: 29.8 G/DL (ref 32–36)
MCHC RBC AUTO-ENTMCNC: 30.1 G/DL (ref 32–36)
MCHC RBC AUTO-ENTMCNC: 30.1 G/DL (ref 32–36)
MCV RBC AUTO: 79.6 FL (ref 80–99)
MCV RBC AUTO: 80.5 FL (ref 80–99)
MCV RBC AUTO: 81.9 FL (ref 80–99)
METHGB BLD QL: 0.5 % (ref 0–1.5)
METHGB BLD QL: 0.6 % (ref 0–1.5)
METHGB BLD QL: 0.8 % (ref 0–1.5)
MODALITY: ABNORMAL
MONOCYTES # BLD AUTO: 0.73 10*3/MM3 (ref 0–1)
MONOCYTES # BLD AUTO: 1.4 10*3/MM3 (ref 0–1)
MONOCYTES # BLD AUTO: 1.87 10*3/MM3 (ref 0–1)
MONOCYTES NFR BLD AUTO: 3 % (ref 0–12)
MONOCYTES NFR BLD AUTO: 4.9 % (ref 0–12)
MONOCYTES NFR BLD AUTO: 6.5 % (ref 0–12)
NEUTROPHILS # BLD AUTO: 22.94 10*3/MM3 (ref 1.5–8.3)
NEUTROPHILS # BLD AUTO: 25.52 10*3/MM3 (ref 1.5–8.3)
NEUTROPHILS # BLD AUTO: 25.94 10*3/MM3 (ref 1.5–8.3)
NEUTROPHILS NFR BLD AUTO: 89.7 % (ref 41–71)
NEUTROPHILS NFR BLD AUTO: 90.7 % (ref 41–71)
NEUTROPHILS NFR BLD AUTO: 94.3 % (ref 41–71)
NITRITE UR QL STRIP: NEGATIVE
OXYHGB MFR BLDV: 80.9 % (ref 94–99)
OXYHGB MFR BLDV: 91.6 % (ref 94–99)
OXYHGB MFR BLDV: 92.1 % (ref 94–99)
PCO2 BLDA: 28.7 MM HG (ref 35–45)
PCO2 BLDA: 29.2 MM HG (ref 35–45)
PCO2 BLDA: 35.2 MM HG (ref 35–45)
PH BLDA: 7.11 PH UNITS (ref 7.35–7.45)
PH BLDA: 7.37 PH UNITS (ref 7.35–7.45)
PH BLDA: 7.59 PH UNITS (ref 7.35–7.45)
PH UR STRIP.AUTO: 5.5 [PH] (ref 5–8)
PHOSPHATE SERPL-MCNC: 4.3 MG/DL (ref 2.4–5.1)
PHOSPHATE SERPL-MCNC: 5.9 MG/DL (ref 2.4–5.1)
PHOSPHATE SERPL-MCNC: 7 MG/DL (ref 2.4–5.1)
PLAT MORPH BLD: NORMAL
PLAT MORPH BLD: NORMAL
PLATELET # BLD AUTO: 257 10*3/MM3 (ref 150–450)
PLATELET # BLD AUTO: 285 10*3/MM3 (ref 150–450)
PLATELET # BLD AUTO: 290 10*3/MM3 (ref 150–450)
PMV BLD AUTO: 11 FL (ref 6–12)
PMV BLD AUTO: 11.2 FL (ref 6–12)
PMV BLD AUTO: 11.4 FL (ref 6–12)
PO2 BLDA: 62.3 MM HG (ref 83–108)
PO2 BLDA: 66.9 MM HG (ref 83–108)
PO2 BLDA: 76.1 MM HG (ref 83–108)
POTASSIUM BLD-SCNC: 3.1 MMOL/L (ref 3.5–5.5)
POTASSIUM BLD-SCNC: 3.4 MMOL/L (ref 3.5–5.5)
POTASSIUM BLD-SCNC: 4 MMOL/L (ref 3.5–5.5)
PROCALCITONIN SERPL-MCNC: 0.67 NG/ML
PROCALCITONIN SERPL-MCNC: 16.72 NG/ML
PROT SERPL-MCNC: 5.1 G/DL (ref 5.7–8.2)
PROT SERPL-MCNC: 5.4 G/DL (ref 5.7–8.2)
PROT UR QL STRIP: ABNORMAL
PROTHROMBIN TIME: 17.9 SECONDS (ref 9.6–11.5)
PROTHROMBIN TIME: 18.1 SECONDS (ref 9.6–11.5)
RBC # BLD AUTO: 4.71 10*6/MM3 (ref 3.89–5.14)
RBC # BLD AUTO: 4.8 10*6/MM3 (ref 3.89–5.14)
RBC # BLD AUTO: 4.8 10*6/MM3 (ref 3.89–5.14)
RBC # UR: ABNORMAL /HPF
RBC MORPH BLD: NORMAL
REF LAB TEST METHOD: ABNORMAL
RH BLD: POSITIVE
SAO2 % BLDCOA: 80.9 %
SAO2 % BLDCOA: 92.1 %
SODIUM BLD-SCNC: 130 MMOL/L (ref 132–146)
SODIUM BLD-SCNC: 134 MMOL/L (ref 132–146)
SODIUM BLD-SCNC: 136 MMOL/L (ref 132–146)
SP GR UR STRIP: 1.02 (ref 1–1.03)
SQUAMOUS #/AREA URNS HPF: ABNORMAL /HPF
TROPONIN I SERPL-MCNC: 1.88 NG/ML
TROPONIN I SERPL-MCNC: 2.71 NG/ML
UROBILINOGEN UR QL STRIP: ABNORMAL
WBC MORPH BLD: NORMAL
WBC MORPH BLD: NORMAL
WBC NRBC COR # BLD: 24.33 10*3/MM3 (ref 3.5–10.8)
WBC NRBC COR # BLD: 28.46 10*3/MM3 (ref 3.5–10.8)
WBC NRBC COR # BLD: 28.6 10*3/MM3 (ref 3.5–10.8)
WBC UR QL AUTO: ABNORMAL /HPF
YEAST URNS QL MICRO: ABNORMAL /HPF

## 2017-03-21 PROCEDURE — 36600 WITHDRAWAL OF ARTERIAL BLOOD: CPT | Performed by: NURSE PRACTITIONER

## 2017-03-21 PROCEDURE — 86901 BLOOD TYPING SEROLOGIC RH(D): CPT | Performed by: NURSE PRACTITIONER

## 2017-03-21 PROCEDURE — 93005 ELECTROCARDIOGRAM TRACING: CPT | Performed by: NURSE PRACTITIONER

## 2017-03-21 PROCEDURE — 4A02X4A MEASUREMENT OF CARDIAC ELECTRICAL ACTIVITY, GUIDANCE, EXTERNAL APPROACH: ICD-10-PCS | Performed by: INTERNAL MEDICINE

## 2017-03-21 PROCEDURE — 94799 UNLISTED PULMONARY SVC/PX: CPT

## 2017-03-21 PROCEDURE — 99222 1ST HOSP IP/OBS MODERATE 55: CPT | Performed by: INTERNAL MEDICINE

## 2017-03-21 PROCEDURE — 25010000002 LORAZEPAM PER 2 MG: Performed by: NURSE PRACTITIONER

## 2017-03-21 PROCEDURE — 86850 RBC ANTIBODY SCREEN: CPT | Performed by: NURSE PRACTITIONER

## 2017-03-21 PROCEDURE — 85610 PROTHROMBIN TIME: CPT | Performed by: NURSE PRACTITIONER

## 2017-03-21 PROCEDURE — 82533 TOTAL CORTISOL: CPT | Performed by: INTERNAL MEDICINE

## 2017-03-21 PROCEDURE — 93010 ELECTROCARDIOGRAM REPORT: CPT | Performed by: INTERNAL MEDICINE

## 2017-03-21 PROCEDURE — 81001 URINALYSIS AUTO W/SCOPE: CPT | Performed by: NURSE PRACTITIONER

## 2017-03-21 PROCEDURE — 25010000002 SULFUR HEXAFLUORIDE MICROSPH 60.7-25 MG RECONSTITUTED SUSPENSION: Performed by: INTERNAL MEDICINE

## 2017-03-21 PROCEDURE — 93306 TTE W/DOPPLER COMPLETE: CPT | Performed by: INTERNAL MEDICINE

## 2017-03-21 PROCEDURE — 87040 BLOOD CULTURE FOR BACTERIA: CPT | Performed by: INTERNAL MEDICINE

## 2017-03-21 PROCEDURE — 82330 ASSAY OF CALCIUM: CPT | Performed by: NURSE PRACTITIONER

## 2017-03-21 PROCEDURE — 25010000002 MORPHINE SULFATE (PF) 2 MG/ML SOLUTION: Performed by: NURSE PRACTITIONER

## 2017-03-21 PROCEDURE — 83605 ASSAY OF LACTIC ACID: CPT | Performed by: INTERNAL MEDICINE

## 2017-03-21 PROCEDURE — C8929 TTE W OR WO FOL WCON,DOPPLER: HCPCS

## 2017-03-21 PROCEDURE — 74000 HC ABDOMEN KUB: CPT

## 2017-03-21 PROCEDURE — 84145 PROCALCITONIN (PCT): CPT | Performed by: INTERNAL MEDICINE

## 2017-03-21 PROCEDURE — 99292 CRITICAL CARE ADDL 30 MIN: CPT | Performed by: INTERNAL MEDICINE

## 2017-03-21 PROCEDURE — 84145 PROCALCITONIN (PCT): CPT | Performed by: NURSE PRACTITIONER

## 2017-03-21 PROCEDURE — 84100 ASSAY OF PHOSPHORUS: CPT | Performed by: NURSE PRACTITIONER

## 2017-03-21 PROCEDURE — 85025 COMPLETE CBC W/AUTO DIFF WBC: CPT | Performed by: NURSE PRACTITIONER

## 2017-03-21 PROCEDURE — 94640 AIRWAY INHALATION TREATMENT: CPT

## 2017-03-21 PROCEDURE — 25010000003 POTASSIUM CHLORIDE PER 2 MEQ: Performed by: INTERNAL MEDICINE

## 2017-03-21 PROCEDURE — 85007 BL SMEAR W/DIFF WBC COUNT: CPT | Performed by: INTERNAL MEDICINE

## 2017-03-21 PROCEDURE — 25010000002 EPINEPHRINE PER 0.1 MG: Performed by: NURSE PRACTITIONER

## 2017-03-21 PROCEDURE — 83605 ASSAY OF LACTIC ACID: CPT | Performed by: NURSE PRACTITIONER

## 2017-03-21 PROCEDURE — 82805 BLOOD GASES W/O2 SATURATION: CPT | Performed by: FAMILY MEDICINE

## 2017-03-21 PROCEDURE — 80053 COMPREHEN METABOLIC PANEL: CPT | Performed by: NURSE PRACTITIONER

## 2017-03-21 PROCEDURE — 25010000002 VANCOMYCIN: Performed by: NURSE PRACTITIONER

## 2017-03-21 PROCEDURE — 25010000002 DOPAMINE PER 40 MG: Performed by: NURSE PRACTITIONER

## 2017-03-21 PROCEDURE — 71250 CT THORAX DX C-: CPT

## 2017-03-21 PROCEDURE — 85007 BL SMEAR W/DIFF WBC COUNT: CPT | Performed by: NURSE PRACTITIONER

## 2017-03-21 PROCEDURE — 84484 ASSAY OF TROPONIN QUANT: CPT | Performed by: INTERNAL MEDICINE

## 2017-03-21 PROCEDURE — 02HV33Z INSERTION OF INFUSION DEVICE INTO SUPERIOR VENA CAVA, PERCUTANEOUS APPROACH: ICD-10-PCS | Performed by: INTERNAL MEDICINE

## 2017-03-21 PROCEDURE — 84100 ASSAY OF PHOSPHORUS: CPT | Performed by: INTERNAL MEDICINE

## 2017-03-21 PROCEDURE — 71010 HC CHEST PA OR AP: CPT

## 2017-03-21 PROCEDURE — 5A1945Z RESPIRATORY VENTILATION, 24-96 CONSECUTIVE HOURS: ICD-10-PCS | Performed by: INTERNAL MEDICINE

## 2017-03-21 PROCEDURE — 93005 ELECTROCARDIOGRAM TRACING: CPT | Performed by: INTERNAL MEDICINE

## 2017-03-21 PROCEDURE — 86900 BLOOD TYPING SEROLOGIC ABO: CPT | Performed by: NURSE PRACTITIONER

## 2017-03-21 PROCEDURE — 99291 CRITICAL CARE FIRST HOUR: CPT | Performed by: INTERNAL MEDICINE

## 2017-03-21 PROCEDURE — 83735 ASSAY OF MAGNESIUM: CPT | Performed by: NURSE PRACTITIONER

## 2017-03-21 PROCEDURE — 74176 CT ABD & PELVIS W/O CONTRAST: CPT

## 2017-03-21 PROCEDURE — 85025 COMPLETE CBC W/AUTO DIFF WBC: CPT | Performed by: INTERNAL MEDICINE

## 2017-03-21 PROCEDURE — 25010000002 PIPERACILLIN SOD-TAZOBACTAM PER 1 G: Performed by: NURSE PRACTITIONER

## 2017-03-21 PROCEDURE — 25010000002 PIPERACILLIN SOD-TAZOBACTAM PER 1 G

## 2017-03-21 PROCEDURE — 36600 WITHDRAWAL OF ARTERIAL BLOOD: CPT | Performed by: FAMILY MEDICINE

## 2017-03-21 PROCEDURE — 94003 VENT MGMT INPAT SUBQ DAY: CPT

## 2017-03-21 PROCEDURE — 82962 GLUCOSE BLOOD TEST: CPT

## 2017-03-21 PROCEDURE — 82805 BLOOD GASES W/O2 SATURATION: CPT | Performed by: NURSE PRACTITIONER

## 2017-03-21 RX ORDER — PANTOPRAZOLE SODIUM 40 MG/10ML
40 INJECTION, POWDER, LYOPHILIZED, FOR SOLUTION INTRAVENOUS
Status: DISCONTINUED | OUTPATIENT
Start: 2017-03-21 | End: 2017-03-25

## 2017-03-21 RX ORDER — ACETAMINOPHEN 650 MG/1
650 SUPPOSITORY RECTAL EVERY 4 HOURS PRN
Status: DISCONTINUED | OUTPATIENT
Start: 2017-03-21 | End: 2017-04-05 | Stop reason: HOSPADM

## 2017-03-21 RX ORDER — POTASSIUM CHLORIDE 1.5 G/1.77G
40 POWDER, FOR SOLUTION ORAL AS NEEDED
Status: DISCONTINUED | OUTPATIENT
Start: 2017-03-21 | End: 2017-04-05 | Stop reason: HOSPADM

## 2017-03-21 RX ORDER — MORPHINE SULFATE 2 MG/ML
2 INJECTION, SOLUTION INTRAMUSCULAR; INTRAVENOUS
Status: DISCONTINUED | OUTPATIENT
Start: 2017-03-21 | End: 2017-03-23

## 2017-03-21 RX ORDER — FENTANYL CITRATE-0.9 % NACL/PF 10 MCG/ML
50-300 PLASTIC BAG, INJECTION (ML) INTRAVENOUS
Status: DISCONTINUED | OUTPATIENT
Start: 2017-03-21 | End: 2017-03-23

## 2017-03-21 RX ORDER — POTASSIUM CHLORIDE 750 MG/1
40 CAPSULE, EXTENDED RELEASE ORAL AS NEEDED
Status: DISCONTINUED | OUTPATIENT
Start: 2017-03-21 | End: 2017-04-05 | Stop reason: HOSPADM

## 2017-03-21 RX ORDER — ONDANSETRON 2 MG/ML
4 INJECTION INTRAMUSCULAR; INTRAVENOUS EVERY 6 HOURS PRN
Status: DISCONTINUED | OUTPATIENT
Start: 2017-03-21 | End: 2017-04-05 | Stop reason: HOSPADM

## 2017-03-21 RX ORDER — FENTANYL CITRATE-0.9 % NACL/PF 10 MCG/ML
PLASTIC BAG, INJECTION (ML) INTRAVENOUS
Status: COMPLETED
Start: 2017-03-21 | End: 2017-03-21

## 2017-03-21 RX ORDER — ACETAMINOPHEN 160 MG/5ML
650 SOLUTION ORAL EVERY 4 HOURS PRN
Status: DISCONTINUED | OUTPATIENT
Start: 2017-03-21 | End: 2017-04-05 | Stop reason: HOSPADM

## 2017-03-21 RX ORDER — LORAZEPAM 2 MG/ML
2 INJECTION INTRAMUSCULAR EVERY 4 HOURS PRN
Status: DISCONTINUED | OUTPATIENT
Start: 2017-03-21 | End: 2017-03-23

## 2017-03-21 RX ORDER — DOPAMINE HYDROCHLORIDE 160 MG/100ML
2-20 INJECTION, SOLUTION INTRAVENOUS
Status: DISCONTINUED | OUTPATIENT
Start: 2017-03-21 | End: 2017-03-22

## 2017-03-21 RX ORDER — CHLORHEXIDINE GLUCONATE 0.12 MG/ML
15 RINSE ORAL EVERY 12 HOURS SCHEDULED
Status: DISCONTINUED | OUTPATIENT
Start: 2017-03-21 | End: 2017-03-23

## 2017-03-21 RX ORDER — ASPIRIN 81 MG/1
81 TABLET, CHEWABLE ORAL DAILY
Status: DISCONTINUED | OUTPATIENT
Start: 2017-03-21 | End: 2017-03-24

## 2017-03-21 RX ORDER — POTASSIUM CHLORIDE 29.8 MG/ML
20 INJECTION INTRAVENOUS
Status: DISCONTINUED | OUTPATIENT
Start: 2017-03-21 | End: 2017-04-05 | Stop reason: HOSPADM

## 2017-03-21 RX ORDER — CLOPIDOGREL BISULFATE 75 MG/1
75 TABLET ORAL DAILY
Status: DISCONTINUED | OUTPATIENT
Start: 2017-03-21 | End: 2017-03-21

## 2017-03-21 RX ORDER — IPRATROPIUM BROMIDE AND ALBUTEROL SULFATE 2.5; .5 MG/3ML; MG/3ML
3 SOLUTION RESPIRATORY (INHALATION) EVERY 6 HOURS PRN
Status: DISCONTINUED | OUTPATIENT
Start: 2017-03-21 | End: 2017-04-05 | Stop reason: HOSPADM

## 2017-03-21 RX ORDER — SODIUM CHLORIDE 0.9 % (FLUSH) 0.9 %
1-10 SYRINGE (ML) INJECTION AS NEEDED
Status: DISCONTINUED | OUTPATIENT
Start: 2017-03-21 | End: 2017-04-05 | Stop reason: HOSPADM

## 2017-03-21 RX ORDER — PHENYLEPHRINE HCL IN 0.9% NACL 0.5 MG/5ML
.5-3 SYRINGE (ML) INTRAVENOUS
Status: DISCONTINUED | OUTPATIENT
Start: 2017-03-21 | End: 2017-03-22

## 2017-03-21 RX ORDER — ONDANSETRON 4 MG/1
4 TABLET, FILM COATED ORAL EVERY 6 HOURS PRN
Status: DISCONTINUED | OUTPATIENT
Start: 2017-03-21 | End: 2017-04-05 | Stop reason: HOSPADM

## 2017-03-21 RX ORDER — ACETAMINOPHEN 325 MG/1
650 TABLET ORAL EVERY 4 HOURS PRN
Status: DISCONTINUED | OUTPATIENT
Start: 2017-03-21 | End: 2017-04-05 | Stop reason: HOSPADM

## 2017-03-21 RX ORDER — SODIUM CHLORIDE 9 MG/ML
100 INJECTION, SOLUTION INTRAVENOUS CONTINUOUS
Status: DISCONTINUED | OUTPATIENT
Start: 2017-03-21 | End: 2017-03-21

## 2017-03-21 RX ORDER — IPRATROPIUM BROMIDE AND ALBUTEROL SULFATE 2.5; .5 MG/3ML; MG/3ML
3 SOLUTION RESPIRATORY (INHALATION)
Status: DISCONTINUED | OUTPATIENT
Start: 2017-03-21 | End: 2017-03-21

## 2017-03-21 RX ADMIN — MORPHINE SULFATE 2 MG: 2 INJECTION, SOLUTION INTRAMUSCULAR; INTRAVENOUS at 01:04

## 2017-03-21 RX ADMIN — CHLORHEXIDINE GLUCONATE 15 ML: 1.2 RINSE ORAL at 01:00

## 2017-03-21 RX ADMIN — ACETAMINOPHEN 650 MG: 650 SUPPOSITORY RECTAL at 09:47

## 2017-03-21 RX ADMIN — MORPHINE SULFATE 2 MG: 2 INJECTION, SOLUTION INTRAMUSCULAR; INTRAVENOUS at 05:50

## 2017-03-21 RX ADMIN — SODIUM BICARBONATE 50 MEQ: 84 INJECTION, SOLUTION INTRAVENOUS at 00:50

## 2017-03-21 RX ADMIN — PIPERACILLIN SODIUM,TAZOBACTAM SODIUM 2.25 G: 2; .25 INJECTION, POWDER, FOR SOLUTION INTRAVENOUS at 01:51

## 2017-03-21 RX ADMIN — SODIUM CHLORIDE 100 ML/HR: 9 INJECTION, SOLUTION INTRAVENOUS at 00:48

## 2017-03-21 RX ADMIN — Medication 0.02 MCG/KG/MIN: at 12:11

## 2017-03-21 RX ADMIN — PANTOPRAZOLE SODIUM 40 MG: 40 INJECTION, POWDER, FOR SOLUTION INTRAVENOUS at 05:50

## 2017-03-21 RX ADMIN — PIPERACILLIN SODIUM,TAZOBACTAM SODIUM 2.25 G: 2; .25 INJECTION, POWDER, FOR SOLUTION INTRAVENOUS at 20:38

## 2017-03-21 RX ADMIN — Medication 3 MCG/KG/MIN: at 00:49

## 2017-03-21 RX ADMIN — SULFUR HEXAFLUORIDE 2 ML: KIT at 13:40

## 2017-03-21 RX ADMIN — POTASSIUM CHLORIDE 20 MEQ: 400 INJECTION, SOLUTION INTRAVENOUS at 10:18

## 2017-03-21 RX ADMIN — POTASSIUM CHLORIDE 20 MEQ: 400 INJECTION, SOLUTION INTRAVENOUS at 11:18

## 2017-03-21 RX ADMIN — MORPHINE SULFATE 2 MG: 2 INJECTION, SOLUTION INTRAMUSCULAR; INTRAVENOUS at 07:58

## 2017-03-21 RX ADMIN — MICAFUNGIN SODIUM 100 MG: 20 INJECTION, POWDER, LYOPHILIZED, FOR SOLUTION INTRAVENOUS at 01:12

## 2017-03-21 RX ADMIN — SODIUM BICARBONATE 75 ML/HR: 84 INJECTION, SOLUTION INTRAVENOUS at 01:52

## 2017-03-21 RX ADMIN — PIPERACILLIN SODIUM,TAZOBACTAM SODIUM 2.25 G: 2; .25 INJECTION, POWDER, FOR SOLUTION INTRAVENOUS at 08:44

## 2017-03-21 RX ADMIN — CHLORHEXIDINE GLUCONATE 15 ML: 1.2 RINSE ORAL at 09:41

## 2017-03-21 RX ADMIN — Medication 50 MCG/HR: at 08:15

## 2017-03-21 RX ADMIN — SODIUM BICARBONATE 50 MEQ: 84 INJECTION, SOLUTION INTRAVENOUS at 00:49

## 2017-03-21 RX ADMIN — DOPAMINE HYDROCHLORIDE 20 MCG/KG/MIN: 160 INJECTION, SOLUTION INTRAVENOUS at 00:48

## 2017-03-21 RX ADMIN — ACETAMINOPHEN 650 MG: 650 SOLUTION ORAL at 15:37

## 2017-03-21 RX ADMIN — EPINEPHRINE 0.3 MCG/KG/MIN: 1 INJECTION INTRAMUSCULAR; INTRAVENOUS; SUBCUTANEOUS at 00:50

## 2017-03-21 RX ADMIN — PIPERACILLIN SODIUM,TAZOBACTAM SODIUM 2.25 G: 2; .25 INJECTION, POWDER, FOR SOLUTION INTRAVENOUS at 15:27

## 2017-03-21 RX ADMIN — LORAZEPAM 2 MG: 2 INJECTION, SOLUTION INTRAMUSCULAR; INTRAVENOUS at 10:18

## 2017-03-21 RX ADMIN — SODIUM BICARBONATE 75 ML/HR: 84 INJECTION, SOLUTION INTRAVENOUS at 15:25

## 2017-03-21 RX ADMIN — CHLORHEXIDINE GLUCONATE 15 ML: 1.2 RINSE ORAL at 20:28

## 2017-03-21 RX ADMIN — Medication 25 MCG/HR: at 23:32

## 2017-03-21 RX ADMIN — Medication 0.3 MCG/KG/MIN: at 00:48

## 2017-03-21 RX ADMIN — VANCOMYCIN HYDROCHLORIDE 1250 MG: 1 INJECTION, POWDER, LYOPHILIZED, FOR SOLUTION INTRAVENOUS at 02:20

## 2017-03-21 RX ADMIN — SODIUM CHLORIDE 100 ML/HR: 9 INJECTION, SOLUTION INTRAVENOUS at 08:43

## 2017-03-21 RX ADMIN — IPRATROPIUM BROMIDE AND ALBUTEROL SULFATE 3 ML: .5; 3 SOLUTION RESPIRATORY (INHALATION) at 01:24

## 2017-03-22 ENCOUNTER — APPOINTMENT (OUTPATIENT)
Dept: GENERAL RADIOLOGY | Facility: HOSPITAL | Age: 82
End: 2017-03-22

## 2017-03-22 PROBLEM — R79.89 ELEVATED LFTS: Status: ACTIVE | Noted: 2017-03-22

## 2017-03-22 LAB
ALBUMIN SERPL-MCNC: 2.4 G/DL (ref 3.2–4.8)
ALBUMIN/GLOB SERPL: 1.2 G/DL (ref 1.5–2.5)
ALP SERPL-CCNC: 203 U/L (ref 25–100)
ALT SERPL W P-5'-P-CCNC: 1485 U/L (ref 7–40)
AMMONIA BLD-SCNC: 35 UMOL/L (ref 19–60)
ANION GAP SERPL CALCULATED.3IONS-SCNC: 8 MMOL/L (ref 3–11)
ANION GAP SERPL CALCULATED.3IONS-SCNC: 8 MMOL/L (ref 3–11)
ARTERIAL PATENCY WRIST A: ABNORMAL
ARTERIAL PATENCY WRIST A: POSITIVE
AST SERPL-CCNC: 1458 U/L (ref 0–33)
ATMOSPHERIC PRESS: ABNORMAL MMHG
ATMOSPHERIC PRESS: ABNORMAL MMHG
BASE EXCESS BLDA CALC-SCNC: 5.9 MMOL/L (ref 0–2)
BASE EXCESS BLDA CALC-SCNC: 6.6 MMOL/L (ref 0–2)
BDY SITE: ABNORMAL
BDY SITE: ABNORMAL
BILIRUB SERPL-MCNC: 0.9 MG/DL (ref 0.3–1.2)
BUN BLD-MCNC: 33 MG/DL (ref 9–23)
BUN BLD-MCNC: 34 MG/DL (ref 9–23)
BUN/CREAT SERPL: 33 (ref 7–25)
BUN/CREAT SERPL: 34 (ref 7–25)
CA-I SERPL ISE-MCNC: 0.96 MMOL/L (ref 1.12–1.32)
CALCIUM SPEC-SCNC: 7 MG/DL (ref 8.7–10.4)
CALCIUM SPEC-SCNC: 7 MG/DL (ref 8.7–10.4)
CHLORIDE SERPL-SCNC: 101 MMOL/L (ref 99–109)
CHLORIDE SERPL-SCNC: 103 MMOL/L (ref 99–109)
CO2 BLDA-SCNC: 30 MMOL/L (ref 22–33)
CO2 BLDA-SCNC: 31.4 MMOL/L (ref 22–33)
CO2 SERPL-SCNC: 27 MMOL/L (ref 20–31)
CO2 SERPL-SCNC: 30 MMOL/L (ref 20–31)
COHGB MFR BLD: 1.8 % (ref 0–2)
COHGB MFR BLD: 1.9 % (ref 0–2)
CREAT BLD-MCNC: 1 MG/DL (ref 0.6–1.3)
CREAT BLD-MCNC: 1 MG/DL (ref 0.6–1.3)
D-LACTATE SERPL-SCNC: 2.8 MMOL/L (ref 0.5–2)
DEPRECATED RDW RBC AUTO: 46.5 FL (ref 37–54)
DEPRECATED RDW RBC AUTO: 48.9 FL (ref 37–54)
ERYTHROCYTE [DISTWIDTH] IN BLOOD BY AUTOMATED COUNT: 16.9 % (ref 11.3–14.5)
ERYTHROCYTE [DISTWIDTH] IN BLOOD BY AUTOMATED COUNT: 17.1 % (ref 11.3–14.5)
GFR SERPL CREATININE-BSD FRML MDRD: 53 ML/MIN/1.73
GFR SERPL CREATININE-BSD FRML MDRD: 53 ML/MIN/1.73
GLOBULIN UR ELPH-MCNC: 2 GM/DL
GLUCOSE BLD-MCNC: 104 MG/DL (ref 70–100)
GLUCOSE BLD-MCNC: 84 MG/DL (ref 70–100)
HCO3 BLDA-SCNC: 29 MMOL/L (ref 20–26)
HCO3 BLDA-SCNC: 30.2 MMOL/L (ref 20–26)
HCT VFR BLD AUTO: 32 % (ref 34.5–44)
HCT VFR BLD AUTO: 32.9 % (ref 34.5–44)
HCT VFR BLD CALC: 30.8 %
HCT VFR BLD CALC: 32.4 %
HGB BLD-MCNC: 10.1 G/DL (ref 11.5–15.5)
HGB BLD-MCNC: 10.2 G/DL (ref 11.5–15.5)
HGB BLDA-MCNC: 10 G/DL (ref 14–18)
HGB BLDA-MCNC: 10.6 G/DL (ref 14–18)
HOROWITZ INDEX BLD+IHG-RTO: 35 %
HOROWITZ INDEX BLD+IHG-RTO: 40 %
MAGNESIUM SERPL-MCNC: 2.1 MG/DL (ref 1.3–2.7)
MCH RBC QN AUTO: 23.5 PG (ref 27–31)
MCH RBC QN AUTO: 24 PG (ref 27–31)
MCHC RBC AUTO-ENTMCNC: 30.7 G/DL (ref 32–36)
MCHC RBC AUTO-ENTMCNC: 31.9 G/DL (ref 32–36)
MCV RBC AUTO: 75.3 FL (ref 80–99)
MCV RBC AUTO: 76.7 FL (ref 80–99)
METHGB BLD QL: 0.7 % (ref 0–1.5)
METHGB BLD QL: 0.7 % (ref 0–1.5)
MODALITY: ABNORMAL
MODALITY: ABNORMAL
OXYHGB MFR BLDV: 91.5 % (ref 94–99)
OXYHGB MFR BLDV: 92.2 % (ref 94–99)
PCO2 BLDA: 33.8 MM HG (ref 35–45)
PCO2 BLDA: 38 MM HG (ref 35–45)
PH BLDA: 7.51 PH UNITS (ref 7.35–7.45)
PH BLDA: 7.54 PH UNITS (ref 7.35–7.45)
PHOSPHATE SERPL-MCNC: 3.6 MG/DL (ref 2.4–5.1)
PLATELET # BLD AUTO: 145 10*3/MM3 (ref 150–450)
PLATELET # BLD AUTO: 153 10*3/MM3 (ref 150–450)
PMV BLD AUTO: 11.4 FL (ref 6–12)
PMV BLD AUTO: ABNORMAL FL (ref 6–12)
PO2 BLDA: 67.2 MM HG (ref 83–108)
PO2 BLDA: 68.4 MM HG (ref 83–108)
POTASSIUM BLD-SCNC: 3 MMOL/L (ref 3.5–5.5)
POTASSIUM BLD-SCNC: 3.6 MMOL/L (ref 3.5–5.5)
POTASSIUM BLD-SCNC: 4.7 MMOL/L (ref 3.5–5.5)
PROT SERPL-MCNC: 4.4 G/DL (ref 5.7–8.2)
RBC # BLD AUTO: 4.25 10*6/MM3 (ref 3.89–5.14)
RBC # BLD AUTO: 4.29 10*6/MM3 (ref 3.89–5.14)
SAO2 % BLDCOA: 92.2 %
SODIUM BLD-SCNC: 138 MMOL/L (ref 132–146)
SODIUM BLD-SCNC: 139 MMOL/L (ref 132–146)
TSH SERPL DL<=0.05 MIU/L-ACNC: 7.37 MIU/ML (ref 0.35–5.35)
VANCOMYCIN SERPL-MCNC: 8.8 MCG/ML (ref 5–40)
WBC NRBC COR # BLD: 11.66 10*3/MM3 (ref 3.5–10.8)
WBC NRBC COR # BLD: 12 10*3/MM3 (ref 3.5–10.8)

## 2017-03-22 PROCEDURE — 94799 UNLISTED PULMONARY SVC/PX: CPT

## 2017-03-22 PROCEDURE — 94760 N-INVAS EAR/PLS OXIMETRY 1: CPT

## 2017-03-22 PROCEDURE — 80202 ASSAY OF VANCOMYCIN: CPT

## 2017-03-22 PROCEDURE — 36600 WITHDRAWAL OF ARTERIAL BLOOD: CPT | Performed by: NURSE PRACTITIONER

## 2017-03-22 PROCEDURE — 99291 CRITICAL CARE FIRST HOUR: CPT | Performed by: INTERNAL MEDICINE

## 2017-03-22 PROCEDURE — 83735 ASSAY OF MAGNESIUM: CPT | Performed by: INTERNAL MEDICINE

## 2017-03-22 PROCEDURE — 84443 ASSAY THYROID STIM HORMONE: CPT | Performed by: INTERNAL MEDICINE

## 2017-03-22 PROCEDURE — 25010000002 HEPARIN FLUSH (PORCINE) 100 UNIT/ML SOLUTION: Performed by: INTERNAL MEDICINE

## 2017-03-22 PROCEDURE — 25010000002 MORPHINE SULFATE (PF) 2 MG/ML SOLUTION: Performed by: NURSE PRACTITIONER

## 2017-03-22 PROCEDURE — 83605 ASSAY OF LACTIC ACID: CPT | Performed by: INTERNAL MEDICINE

## 2017-03-22 PROCEDURE — 71010 HC CHEST PA OR AP: CPT

## 2017-03-22 PROCEDURE — 25010000002 VANCOMYCIN PER 500 MG

## 2017-03-22 PROCEDURE — 82330 ASSAY OF CALCIUM: CPT | Performed by: INTERNAL MEDICINE

## 2017-03-22 PROCEDURE — 82805 BLOOD GASES W/O2 SATURATION: CPT | Performed by: INTERNAL MEDICINE

## 2017-03-22 PROCEDURE — 82805 BLOOD GASES W/O2 SATURATION: CPT | Performed by: NURSE PRACTITIONER

## 2017-03-22 PROCEDURE — 36600 WITHDRAWAL OF ARTERIAL BLOOD: CPT | Performed by: INTERNAL MEDICINE

## 2017-03-22 PROCEDURE — 80053 COMPREHEN METABOLIC PANEL: CPT | Performed by: FAMILY MEDICINE

## 2017-03-22 PROCEDURE — 25010000002 PIPERACILLIN SOD-TAZOBACTAM PER 1 G

## 2017-03-22 PROCEDURE — C1894 INTRO/SHEATH, NON-LASER: HCPCS

## 2017-03-22 PROCEDURE — 25010000002 FUROSEMIDE PER 20 MG: Performed by: INTERNAL MEDICINE

## 2017-03-22 PROCEDURE — 94640 AIRWAY INHALATION TREATMENT: CPT

## 2017-03-22 PROCEDURE — 85027 COMPLETE CBC AUTOMATED: CPT | Performed by: INTERNAL MEDICINE

## 2017-03-22 PROCEDURE — 84100 ASSAY OF PHOSPHORUS: CPT | Performed by: INTERNAL MEDICINE

## 2017-03-22 PROCEDURE — 99232 SBSQ HOSP IP/OBS MODERATE 35: CPT | Performed by: INTERNAL MEDICINE

## 2017-03-22 PROCEDURE — 85027 COMPLETE CBC AUTOMATED: CPT | Performed by: FAMILY MEDICINE

## 2017-03-22 PROCEDURE — C1751 CATH, INF, PER/CENT/MIDLINE: HCPCS

## 2017-03-22 PROCEDURE — 84132 ASSAY OF SERUM POTASSIUM: CPT | Performed by: INTERNAL MEDICINE

## 2017-03-22 PROCEDURE — 94003 VENT MGMT INPAT SUBQ DAY: CPT

## 2017-03-22 PROCEDURE — 82140 ASSAY OF AMMONIA: CPT | Performed by: INTERNAL MEDICINE

## 2017-03-22 RX ORDER — HYDROCODONE BITARTRATE AND ACETAMINOPHEN 10; 325 MG/1; MG/1
1 TABLET ORAL EVERY 6 HOURS PRN
COMMUNITY
End: 2017-04-05 | Stop reason: HOSPADM

## 2017-03-22 RX ORDER — KETOCONAZOLE 20 MG/G
1 CREAM TOPICAL 2 TIMES DAILY
COMMUNITY
End: 2017-04-05 | Stop reason: HOSPADM

## 2017-03-22 RX ORDER — LEVOTHYROXINE SODIUM 0.1 MG/1
100 TABLET ORAL DAILY
COMMUNITY
End: 2017-04-05 | Stop reason: HOSPADM

## 2017-03-22 RX ORDER — FUROSEMIDE 10 MG/ML
40 INJECTION INTRAMUSCULAR; INTRAVENOUS EVERY 12 HOURS
Status: DISCONTINUED | OUTPATIENT
Start: 2017-03-22 | End: 2017-03-23

## 2017-03-22 RX ORDER — VANCOMYCIN HYDROCHLORIDE 1 G/200ML
1000 INJECTION, SOLUTION INTRAVENOUS EVERY 24 HOURS
Status: DISCONTINUED | OUTPATIENT
Start: 2017-03-23 | End: 2017-03-23

## 2017-03-22 RX ORDER — WARFARIN SODIUM 3 MG/1
3 TABLET ORAL
COMMUNITY
End: 2017-04-05 | Stop reason: HOSPADM

## 2017-03-22 RX ORDER — CYCLOBENZAPRINE HCL 5 MG
2.5-5 TABLET ORAL 2 TIMES DAILY WITH MEALS
COMMUNITY
End: 2017-04-05 | Stop reason: HOSPADM

## 2017-03-22 RX ORDER — VANCOMYCIN HYDROCHLORIDE 1 G/200ML
1000 INJECTION, SOLUTION INTRAVENOUS ONCE
Status: COMPLETED | OUTPATIENT
Start: 2017-03-22 | End: 2017-03-22

## 2017-03-22 RX ADMIN — MORPHINE SULFATE 2 MG: 2 INJECTION, SOLUTION INTRAMUSCULAR; INTRAVENOUS at 21:14

## 2017-03-22 RX ADMIN — SODIUM BICARBONATE 25 ML/HR: 84 INJECTION, SOLUTION INTRAVENOUS at 06:50

## 2017-03-22 RX ADMIN — FUROSEMIDE 40 MG: 10 INJECTION, SOLUTION INTRAMUSCULAR; INTRAVENOUS at 21:14

## 2017-03-22 RX ADMIN — POTASSIUM CHLORIDE 40 MEQ: 1.5 POWDER, FOR SOLUTION ORAL at 01:53

## 2017-03-22 RX ADMIN — MORPHINE SULFATE 2 MG: 2 INJECTION, SOLUTION INTRAMUSCULAR; INTRAVENOUS at 13:29

## 2017-03-22 RX ADMIN — MORPHINE SULFATE 2 MG: 2 INJECTION, SOLUTION INTRAMUSCULAR; INTRAVENOUS at 16:33

## 2017-03-22 RX ADMIN — FUROSEMIDE 40 MG: 10 INJECTION, SOLUTION INTRAMUSCULAR; INTRAVENOUS at 10:02

## 2017-03-22 RX ADMIN — PIPERACILLIN SODIUM,TAZOBACTAM SODIUM 2.25 G: 2; .25 INJECTION, POWDER, FOR SOLUTION INTRAVENOUS at 08:02

## 2017-03-22 RX ADMIN — PIPERACILLIN SODIUM,TAZOBACTAM SODIUM 2.25 G: 2; .25 INJECTION, POWDER, FOR SOLUTION INTRAVENOUS at 01:53

## 2017-03-22 RX ADMIN — POTASSIUM CHLORIDE 40 MEQ: 1.5 POWDER, FOR SOLUTION ORAL at 06:50

## 2017-03-22 RX ADMIN — CHLORHEXIDINE GLUCONATE 15 ML: 1.2 RINSE ORAL at 08:04

## 2017-03-22 RX ADMIN — PIPERACILLIN SODIUM,TAZOBACTAM SODIUM 2.25 G: 2; .25 INJECTION, POWDER, FOR SOLUTION INTRAVENOUS at 20:56

## 2017-03-22 RX ADMIN — PANTOPRAZOLE SODIUM 40 MG: 40 INJECTION, POWDER, FOR SOLUTION INTRAVENOUS at 06:50

## 2017-03-22 RX ADMIN — POTASSIUM CHLORIDE 40 MEQ: 1.5 POWDER, FOR SOLUTION ORAL at 14:52

## 2017-03-22 RX ADMIN — VANCOMYCIN HYDROCHLORIDE 1000 MG: 1 INJECTION, SOLUTION INTRAVENOUS at 13:04

## 2017-03-22 RX ADMIN — SODIUM CHLORIDE, PRESERVATIVE FREE 500 UNITS: 5 INJECTION INTRAVENOUS at 13:29

## 2017-03-22 RX ADMIN — ASPIRIN 81 MG: 81 TABLET, CHEWABLE ORAL at 10:02

## 2017-03-22 RX ADMIN — CHLORHEXIDINE GLUCONATE 15 ML: 1.2 RINSE ORAL at 21:14

## 2017-03-22 RX ADMIN — IPRATROPIUM BROMIDE AND ALBUTEROL SULFATE 3 ML: .5; 3 SOLUTION RESPIRATORY (INHALATION) at 16:50

## 2017-03-22 RX ADMIN — MORPHINE SULFATE 2 MG: 2 INJECTION, SOLUTION INTRAMUSCULAR; INTRAVENOUS at 11:43

## 2017-03-22 RX ADMIN — SODIUM CHLORIDE, PRESERVATIVE FREE 500 UNITS: 5 INJECTION INTRAVENOUS at 20:56

## 2017-03-22 RX ADMIN — PIPERACILLIN SODIUM,TAZOBACTAM SODIUM 2.25 G: 2; .25 INJECTION, POWDER, FOR SOLUTION INTRAVENOUS at 13:03

## 2017-03-22 RX ADMIN — MORPHINE SULFATE 2 MG: 2 INJECTION, SOLUTION INTRAMUSCULAR; INTRAVENOUS at 18:16

## 2017-03-22 RX ADMIN — POTASSIUM CHLORIDE 40 MEQ: 1.5 POWDER, FOR SOLUTION ORAL at 10:02

## 2017-03-23 ENCOUNTER — APPOINTMENT (OUTPATIENT)
Dept: GENERAL RADIOLOGY | Facility: HOSPITAL | Age: 82
End: 2017-03-23

## 2017-03-23 LAB
ALBUMIN SERPL-MCNC: 2.5 G/DL (ref 3.2–4.8)
ALBUMIN/GLOB SERPL: 1.2 G/DL (ref 1.5–2.5)
ALP SERPL-CCNC: 228 U/L (ref 25–100)
ALT SERPL W P-5'-P-CCNC: 1127 U/L (ref 7–40)
ANION GAP SERPL CALCULATED.3IONS-SCNC: 9 MMOL/L (ref 3–11)
AST SERPL-CCNC: 827 U/L (ref 0–33)
BILIRUB SERPL-MCNC: 1.1 MG/DL (ref 0.3–1.2)
BUN BLD-MCNC: 35 MG/DL (ref 9–23)
BUN/CREAT SERPL: 35 (ref 7–25)
CA-I SERPL ISE-MCNC: 0.91 MMOL/L (ref 1.12–1.32)
CALCIUM SPEC-SCNC: 7.3 MG/DL (ref 8.7–10.4)
CHLORIDE SERPL-SCNC: 103 MMOL/L (ref 99–109)
CO2 SERPL-SCNC: 30 MMOL/L (ref 20–31)
CREAT BLD-MCNC: 1 MG/DL (ref 0.6–1.3)
DEPRECATED RDW RBC AUTO: 53.1 FL (ref 37–54)
ERYTHROCYTE [DISTWIDTH] IN BLOOD BY AUTOMATED COUNT: 17.7 % (ref 11.3–14.5)
GFR SERPL CREATININE-BSD FRML MDRD: 53 ML/MIN/1.73
GLOBULIN UR ELPH-MCNC: 2.1 GM/DL
GLUCOSE BLD-MCNC: 48 MG/DL (ref 70–100)
HCT VFR BLD AUTO: 33.6 % (ref 34.5–44)
HGB BLD-MCNC: 10 G/DL (ref 11.5–15.5)
MAGNESIUM SERPL-MCNC: 2.1 MG/DL (ref 1.3–2.7)
MCH RBC QN AUTO: 24.1 PG (ref 27–31)
MCHC RBC AUTO-ENTMCNC: 29.8 G/DL (ref 32–36)
MCV RBC AUTO: 81 FL (ref 80–99)
PHOSPHATE SERPL-MCNC: 3.6 MG/DL (ref 2.4–5.1)
PLATELET # BLD AUTO: 169 10*3/MM3 (ref 150–450)
PMV BLD AUTO: 11.9 FL (ref 6–12)
POTASSIUM BLD-SCNC: 4.2 MMOL/L (ref 3.5–5.5)
PROT SERPL-MCNC: 4.6 G/DL (ref 5.7–8.2)
RBC # BLD AUTO: 4.15 10*6/MM3 (ref 3.89–5.14)
SODIUM BLD-SCNC: 142 MMOL/L (ref 132–146)
WBC NRBC COR # BLD: 12.09 10*3/MM3 (ref 3.5–10.8)

## 2017-03-23 PROCEDURE — 25010000002 ENOXAPARIN PER 10 MG: Performed by: INTERNAL MEDICINE

## 2017-03-23 PROCEDURE — 97163 PT EVAL HIGH COMPLEX 45 MIN: CPT

## 2017-03-23 PROCEDURE — 99232 SBSQ HOSP IP/OBS MODERATE 35: CPT | Performed by: INTERNAL MEDICINE

## 2017-03-23 PROCEDURE — 85027 COMPLETE CBC AUTOMATED: CPT | Performed by: INTERNAL MEDICINE

## 2017-03-23 PROCEDURE — 25010000002 MORPHINE SULFATE (PF) 2 MG/ML SOLUTION: Performed by: NURSE PRACTITIONER

## 2017-03-23 PROCEDURE — 92612 ENDOSCOPY SWALLOW (FEES) VID: CPT

## 2017-03-23 PROCEDURE — 82330 ASSAY OF CALCIUM: CPT | Performed by: INTERNAL MEDICINE

## 2017-03-23 PROCEDURE — 80053 COMPREHEN METABOLIC PANEL: CPT | Performed by: INTERNAL MEDICINE

## 2017-03-23 PROCEDURE — 99291 CRITICAL CARE FIRST HOUR: CPT | Performed by: INTERNAL MEDICINE

## 2017-03-23 PROCEDURE — 83735 ASSAY OF MAGNESIUM: CPT | Performed by: INTERNAL MEDICINE

## 2017-03-23 PROCEDURE — 25010000002 HEPARIN FLUSH (PORCINE) 100 UNIT/ML SOLUTION: Performed by: INTERNAL MEDICINE

## 2017-03-23 PROCEDURE — 25010000002 PIPERACILLIN SOD-TAZOBACTAM PER 1 G

## 2017-03-23 PROCEDURE — 25010000002 VANCOMYCIN PER 500 MG

## 2017-03-23 PROCEDURE — 25010000002 FUROSEMIDE PER 20 MG: Performed by: INTERNAL MEDICINE

## 2017-03-23 PROCEDURE — 71010 HC CHEST PA OR AP: CPT

## 2017-03-23 PROCEDURE — 92610 EVALUATE SWALLOWING FUNCTION: CPT

## 2017-03-23 PROCEDURE — 84100 ASSAY OF PHOSPHORUS: CPT | Performed by: INTERNAL MEDICINE

## 2017-03-23 RX ORDER — CARVEDILOL 6.25 MG/1
6.25 TABLET ORAL EVERY 12 HOURS SCHEDULED
Status: DISCONTINUED | OUTPATIENT
Start: 2017-03-23 | End: 2017-03-24

## 2017-03-23 RX ORDER — LORAZEPAM 2 MG/ML
1 INJECTION INTRAMUSCULAR EVERY 4 HOURS PRN
Status: DISPENSED | OUTPATIENT
Start: 2017-03-23 | End: 2017-03-31

## 2017-03-23 RX ORDER — OXYCODONE HYDROCHLORIDE AND ACETAMINOPHEN 5; 325 MG/1; MG/1
1 TABLET ORAL EVERY 4 HOURS PRN
Status: DISPENSED | OUTPATIENT
Start: 2017-03-23 | End: 2017-04-02

## 2017-03-23 RX ORDER — FUROSEMIDE 10 MG/ML
20 INJECTION INTRAMUSCULAR; INTRAVENOUS EVERY 12 HOURS
Status: COMPLETED | OUTPATIENT
Start: 2017-03-23 | End: 2017-03-23

## 2017-03-23 RX ADMIN — CARVEDILOL 6.25 MG: 6.25 TABLET, FILM COATED ORAL at 10:42

## 2017-03-23 RX ADMIN — FUROSEMIDE 20 MG: 10 INJECTION, SOLUTION INTRAMUSCULAR; INTRAVENOUS at 21:58

## 2017-03-23 RX ADMIN — SODIUM CHLORIDE, PRESERVATIVE FREE 500 UNITS: 5 INJECTION INTRAVENOUS at 08:28

## 2017-03-23 RX ADMIN — CARVEDILOL 6.25 MG: 6.25 TABLET, FILM COATED ORAL at 20:12

## 2017-03-23 RX ADMIN — VANCOMYCIN HYDROCHLORIDE 1000 MG: 1 INJECTION, SOLUTION INTRAVENOUS at 08:28

## 2017-03-23 RX ADMIN — PANTOPRAZOLE SODIUM 40 MG: 40 INJECTION, POWDER, FOR SOLUTION INTRAVENOUS at 06:08

## 2017-03-23 RX ADMIN — OXYCODONE AND ACETAMINOPHEN 1 TABLET: 5; 325 TABLET ORAL at 14:48

## 2017-03-23 RX ADMIN — OXYCODONE AND ACETAMINOPHEN 1 TABLET: 5; 325 TABLET ORAL at 20:13

## 2017-03-23 RX ADMIN — PIPERACILLIN SODIUM,TAZOBACTAM SODIUM 2.25 G: 2; .25 INJECTION, POWDER, FOR SOLUTION INTRAVENOUS at 14:23

## 2017-03-23 RX ADMIN — MORPHINE SULFATE 2 MG: 2 INJECTION, SOLUTION INTRAMUSCULAR; INTRAVENOUS at 00:30

## 2017-03-23 RX ADMIN — ASPIRIN 81 MG: 81 TABLET, CHEWABLE ORAL at 08:28

## 2017-03-23 RX ADMIN — ACETAMINOPHEN 650 MG: 650 SOLUTION ORAL at 11:21

## 2017-03-23 RX ADMIN — PIPERACILLIN SODIUM,TAZOBACTAM SODIUM 2.25 G: 2; .25 INJECTION, POWDER, FOR SOLUTION INTRAVENOUS at 08:28

## 2017-03-23 RX ADMIN — PIPERACILLIN SODIUM,TAZOBACTAM SODIUM 2.25 G: 2; .25 INJECTION, POWDER, FOR SOLUTION INTRAVENOUS at 02:08

## 2017-03-23 RX ADMIN — FUROSEMIDE 20 MG: 10 INJECTION, SOLUTION INTRAMUSCULAR; INTRAVENOUS at 10:41

## 2017-03-23 RX ADMIN — SODIUM CHLORIDE, PRESERVATIVE FREE 500 UNITS: 5 INJECTION INTRAVENOUS at 20:12

## 2017-03-23 RX ADMIN — PIPERACILLIN SODIUM,TAZOBACTAM SODIUM 2.25 G: 2; .25 INJECTION, POWDER, FOR SOLUTION INTRAVENOUS at 20:12

## 2017-03-23 RX ADMIN — MORPHINE SULFATE 2 MG: 2 INJECTION, SOLUTION INTRAMUSCULAR; INTRAVENOUS at 02:08

## 2017-03-23 RX ADMIN — ENOXAPARIN SODIUM 40 MG: 40 INJECTION SUBCUTANEOUS at 10:41

## 2017-03-23 RX ADMIN — MORPHINE SULFATE 2 MG: 2 INJECTION, SOLUTION INTRAMUSCULAR; INTRAVENOUS at 11:22

## 2017-03-24 ENCOUNTER — APPOINTMENT (OUTPATIENT)
Dept: GENERAL RADIOLOGY | Facility: HOSPITAL | Age: 82
End: 2017-03-24

## 2017-03-24 ENCOUNTER — APPOINTMENT (OUTPATIENT)
Dept: CT IMAGING | Facility: HOSPITAL | Age: 82
End: 2017-03-24
Attending: INTERNAL MEDICINE

## 2017-03-24 PROBLEM — G93.40 ENCEPHALOPATHY: Status: ACTIVE | Noted: 2017-03-24

## 2017-03-24 LAB
ALBUMIN SERPL-MCNC: 2.5 G/DL (ref 3.2–4.8)
ALBUMIN/GLOB SERPL: 1.1 G/DL (ref 1.5–2.5)
ALP SERPL-CCNC: 188 U/L (ref 25–100)
ALT SERPL W P-5'-P-CCNC: 743 U/L (ref 7–40)
ANION GAP SERPL CALCULATED.3IONS-SCNC: 6 MMOL/L (ref 3–11)
AST SERPL-CCNC: 462 U/L (ref 0–33)
BILIRUB SERPL-MCNC: 1.1 MG/DL (ref 0.3–1.2)
BUN BLD-MCNC: 35 MG/DL (ref 9–23)
BUN/CREAT SERPL: 38.9 (ref 7–25)
CALCIUM SPEC-SCNC: 8 MG/DL (ref 8.7–10.4)
CHLORIDE SERPL-SCNC: 106 MMOL/L (ref 99–109)
CO2 SERPL-SCNC: 32 MMOL/L (ref 20–31)
CREAT BLD-MCNC: 0.9 MG/DL (ref 0.6–1.3)
DEPRECATED RDW RBC AUTO: 51.8 FL (ref 37–54)
ERYTHROCYTE [DISTWIDTH] IN BLOOD BY AUTOMATED COUNT: 17.5 % (ref 11.3–14.5)
GFR SERPL CREATININE-BSD FRML MDRD: 60 ML/MIN/1.73
GLOBULIN UR ELPH-MCNC: 2.2 GM/DL
GLUCOSE BLD-MCNC: 45 MG/DL (ref 70–100)
GLUCOSE BLDC GLUCOMTR-MCNC: 48 MG/DL (ref 70–130)
GLUCOSE BLDC GLUCOMTR-MCNC: 73 MG/DL (ref 70–130)
GLUCOSE BLDC GLUCOMTR-MCNC: 78 MG/DL (ref 70–130)
GLUCOSE BLDC GLUCOMTR-MCNC: 89 MG/DL (ref 70–130)
GLUCOSE BLDC GLUCOMTR-MCNC: 97 MG/DL (ref 70–130)
HCT VFR BLD AUTO: 32.5 % (ref 34.5–44)
HGB BLD-MCNC: 9.9 G/DL (ref 11.5–15.5)
MCH RBC QN AUTO: 24.4 PG (ref 27–31)
MCHC RBC AUTO-ENTMCNC: 30.5 G/DL (ref 32–36)
MCV RBC AUTO: 80.2 FL (ref 80–99)
PLATELET # BLD AUTO: 150 10*3/MM3 (ref 150–450)
PMV BLD AUTO: ABNORMAL FL (ref 6–12)
POTASSIUM BLD-SCNC: 3.4 MMOL/L (ref 3.5–5.5)
POTASSIUM BLD-SCNC: 4.6 MMOL/L (ref 3.5–5.5)
PROT SERPL-MCNC: 4.7 G/DL (ref 5.7–8.2)
RBC # BLD AUTO: 4.05 10*6/MM3 (ref 3.89–5.14)
SODIUM BLD-SCNC: 144 MMOL/L (ref 132–146)
WBC NRBC COR # BLD: 9.16 10*3/MM3 (ref 3.5–10.8)

## 2017-03-24 PROCEDURE — 80053 COMPREHEN METABOLIC PANEL: CPT

## 2017-03-24 PROCEDURE — 82962 GLUCOSE BLOOD TEST: CPT

## 2017-03-24 PROCEDURE — 85027 COMPLETE CBC AUTOMATED: CPT | Performed by: INTERNAL MEDICINE

## 2017-03-24 PROCEDURE — 92612 ENDOSCOPY SWALLOW (FEES) VID: CPT

## 2017-03-24 PROCEDURE — 99291 CRITICAL CARE FIRST HOUR: CPT | Performed by: INTERNAL MEDICINE

## 2017-03-24 PROCEDURE — 92610 EVALUATE SWALLOWING FUNCTION: CPT

## 2017-03-24 PROCEDURE — 84132 ASSAY OF SERUM POTASSIUM: CPT | Performed by: INTERNAL MEDICINE

## 2017-03-24 PROCEDURE — 84478 ASSAY OF TRIGLYCERIDES: CPT

## 2017-03-24 PROCEDURE — 83735 ASSAY OF MAGNESIUM: CPT

## 2017-03-24 PROCEDURE — 93010 ELECTROCARDIOGRAM REPORT: CPT | Performed by: INTERNAL MEDICINE

## 2017-03-24 PROCEDURE — 70450 CT HEAD/BRAIN W/O DYE: CPT

## 2017-03-24 PROCEDURE — 25010000002 HEPARIN FLUSH (PORCINE) 100 UNIT/ML SOLUTION: Performed by: INTERNAL MEDICINE

## 2017-03-24 PROCEDURE — 74000 HC ABDOMEN KUB: CPT

## 2017-03-24 PROCEDURE — 97110 THERAPEUTIC EXERCISES: CPT

## 2017-03-24 PROCEDURE — 82465 ASSAY BLD/SERUM CHOLESTEROL: CPT

## 2017-03-24 PROCEDURE — 93005 ELECTROCARDIOGRAM TRACING: CPT | Performed by: INTERNAL MEDICINE

## 2017-03-24 PROCEDURE — 25010000002 ENOXAPARIN PER 10 MG: Performed by: INTERNAL MEDICINE

## 2017-03-24 PROCEDURE — 25010000002 FUROSEMIDE PER 20 MG: Performed by: INTERNAL MEDICINE

## 2017-03-24 PROCEDURE — 99232 SBSQ HOSP IP/OBS MODERATE 35: CPT | Performed by: INTERNAL MEDICINE

## 2017-03-24 PROCEDURE — 80053 COMPREHEN METABOLIC PANEL: CPT | Performed by: INTERNAL MEDICINE

## 2017-03-24 PROCEDURE — 25010000003 POTASSIUM CHLORIDE PER 2 MEQ: Performed by: INTERNAL MEDICINE

## 2017-03-24 PROCEDURE — 84100 ASSAY OF PHOSPHORUS: CPT

## 2017-03-24 PROCEDURE — 25010000002 METOCLOPRAMIDE PER 10 MG: Performed by: INTERNAL MEDICINE

## 2017-03-24 PROCEDURE — 25010000002 PIPERACILLIN SOD-TAZOBACTAM PER 1 G

## 2017-03-24 RX ORDER — DEXTROSE MONOHYDRATE 100 MG/ML
40 INJECTION, SOLUTION INTRAVENOUS CONTINUOUS
Status: DISCONTINUED | OUTPATIENT
Start: 2017-03-24 | End: 2017-03-25

## 2017-03-24 RX ORDER — FUROSEMIDE 10 MG/ML
20 INJECTION INTRAMUSCULAR; INTRAVENOUS EVERY 12 HOURS SCHEDULED
Status: COMPLETED | OUTPATIENT
Start: 2017-03-24 | End: 2017-03-24

## 2017-03-24 RX ORDER — ASPIRIN 81 MG/1
81 TABLET, CHEWABLE ORAL DAILY
Status: DISCONTINUED | OUTPATIENT
Start: 2017-03-24 | End: 2017-04-05 | Stop reason: HOSPADM

## 2017-03-24 RX ORDER — MORPHINE SULFATE 100 MG/5ML
5 SOLUTION ORAL
Status: DISCONTINUED | OUTPATIENT
Start: 2017-03-24 | End: 2017-03-29

## 2017-03-24 RX ORDER — AMINO ACIDS/PROTEIN HYDROLYS 15G-100/30
1 LIQUID (ML) ORAL DAILY
Status: DISCONTINUED | OUTPATIENT
Start: 2017-03-24 | End: 2017-03-28

## 2017-03-24 RX ORDER — METOCLOPRAMIDE HYDROCHLORIDE 5 MG/ML
10 INJECTION INTRAMUSCULAR; INTRAVENOUS ONCE
Status: COMPLETED | OUTPATIENT
Start: 2017-03-24 | End: 2017-03-24

## 2017-03-24 RX ORDER — DULOXETIN HYDROCHLORIDE 60 MG/1
60 CAPSULE, DELAYED RELEASE ORAL DAILY
Status: DISCONTINUED | OUTPATIENT
Start: 2017-03-25 | End: 2017-03-27

## 2017-03-24 RX ORDER — DEXTROSE MONOHYDRATE 25 G/50ML
INJECTION, SOLUTION INTRAVENOUS
Status: COMPLETED
Start: 2017-03-24 | End: 2017-03-24

## 2017-03-24 RX ORDER — DULOXETIN HYDROCHLORIDE 60 MG/1
60 CAPSULE, DELAYED RELEASE ORAL DAILY
Status: DISCONTINUED | OUTPATIENT
Start: 2017-03-24 | End: 2017-03-24

## 2017-03-24 RX ORDER — LEVOTHYROXINE SODIUM 0.1 MG/1
100 TABLET ORAL DAILY
Status: DISCONTINUED | OUTPATIENT
Start: 2017-03-24 | End: 2017-03-24

## 2017-03-24 RX ORDER — LEVOTHYROXINE SODIUM 0.1 MG/1
100 TABLET ORAL
Status: DISCONTINUED | OUTPATIENT
Start: 2017-03-25 | End: 2017-04-05 | Stop reason: HOSPADM

## 2017-03-24 RX ORDER — CARVEDILOL 6.25 MG/1
6.25 TABLET ORAL EVERY 12 HOURS SCHEDULED
Status: DISCONTINUED | OUTPATIENT
Start: 2017-03-24 | End: 2017-04-05 | Stop reason: HOSPADM

## 2017-03-24 RX ADMIN — METOCLOPRAMIDE 10 MG: 5 INJECTION, SOLUTION INTRAMUSCULAR; INTRAVENOUS at 12:09

## 2017-03-24 RX ADMIN — PIPERACILLIN SODIUM,TAZOBACTAM SODIUM 2.25 G: 2; .25 INJECTION, POWDER, FOR SOLUTION INTRAVENOUS at 02:08

## 2017-03-24 RX ADMIN — DEXTROSE MONOHYDRATE 40 ML/HR: 100 INJECTION, SOLUTION INTRAVENOUS at 05:18

## 2017-03-24 RX ADMIN — SODIUM CHLORIDE, PRESERVATIVE FREE 500 UNITS: 5 INJECTION INTRAVENOUS at 20:00

## 2017-03-24 RX ADMIN — PANTOPRAZOLE SODIUM 40 MG: 40 INJECTION, POWDER, FOR SOLUTION INTRAVENOUS at 05:16

## 2017-03-24 RX ADMIN — PIPERACILLIN SODIUM,TAZOBACTAM SODIUM 2.25 G: 2; .25 INJECTION, POWDER, FOR SOLUTION INTRAVENOUS at 08:21

## 2017-03-24 RX ADMIN — MORPHINE SULFATE 5 MG: 100 SOLUTION ORAL at 14:30

## 2017-03-24 RX ADMIN — CARVEDILOL 6.25 MG: 6.25 TABLET, FILM COATED ORAL at 17:12

## 2017-03-24 RX ADMIN — SACUBITRIL AND VALSARTAN 1 TABLET: 24; 26 TABLET, FILM COATED ORAL at 17:16

## 2017-03-24 RX ADMIN — FUROSEMIDE 20 MG: 10 INJECTION, SOLUTION INTRAMUSCULAR; INTRAVENOUS at 20:00

## 2017-03-24 RX ADMIN — SODIUM CHLORIDE, PRESERVATIVE FREE 500 UNITS: 5 INJECTION INTRAVENOUS at 08:23

## 2017-03-24 RX ADMIN — OXYCODONE AND ACETAMINOPHEN 1 TABLET: 5; 325 TABLET ORAL at 03:25

## 2017-03-24 RX ADMIN — ENOXAPARIN SODIUM 40 MG: 40 INJECTION SUBCUTANEOUS at 11:51

## 2017-03-24 RX ADMIN — DEXTROSE MONOHYDRATE 25 ML: 25 INJECTION, SOLUTION INTRAVENOUS at 04:54

## 2017-03-24 RX ADMIN — ASPIRIN 81 MG: 81 TABLET, CHEWABLE ORAL at 17:12

## 2017-03-24 RX ADMIN — POTASSIUM CHLORIDE 20 MEQ: 400 INJECTION, SOLUTION INTRAVENOUS at 11:51

## 2017-03-24 RX ADMIN — PIPERACILLIN SODIUM,TAZOBACTAM SODIUM 2.25 G: 2; .25 INJECTION, POWDER, FOR SOLUTION INTRAVENOUS at 20:00

## 2017-03-24 RX ADMIN — PIPERACILLIN SODIUM,TAZOBACTAM SODIUM 2.25 G: 2; .25 INJECTION, POWDER, FOR SOLUTION INTRAVENOUS at 15:42

## 2017-03-24 RX ADMIN — POTASSIUM CHLORIDE 40 MEQ: 1.5 POWDER, FOR SOLUTION ORAL at 04:54

## 2017-03-24 RX ADMIN — Medication 1 PACKET: at 17:12

## 2017-03-24 RX ADMIN — FUROSEMIDE 20 MG: 10 INJECTION, SOLUTION INTRAMUSCULAR; INTRAVENOUS at 12:59

## 2017-03-25 PROBLEM — I63.9 CVA (CEREBRAL VASCULAR ACCIDENT) (HCC): Status: ACTIVE | Noted: 2017-03-25

## 2017-03-25 LAB
ALBUMIN SERPL-MCNC: 2.5 G/DL (ref 3.2–4.8)
ALBUMIN SERPL-MCNC: 2.6 G/DL (ref 3.2–4.8)
ALBUMIN/GLOB SERPL: 1.1 G/DL (ref 1.5–2.5)
ALP SERPL-CCNC: 247 U/L (ref 25–100)
ALP SERPL-CCNC: 278 U/L (ref 25–100)
ALT SERPL W P-5'-P-CCNC: 533 U/L (ref 7–40)
ALT SERPL W P-5'-P-CCNC: 568 U/L (ref 7–40)
ANION GAP SERPL CALCULATED.3IONS-SCNC: 6 MMOL/L (ref 3–11)
ANION GAP SERPL CALCULATED.3IONS-SCNC: 6 MMOL/L (ref 3–11)
AST SERPL-CCNC: 248 U/L (ref 0–33)
AST SERPL-CCNC: 273 U/L (ref 0–33)
BILIRUB SERPL-MCNC: 0.7 MG/DL (ref 0.3–1.2)
BILIRUB SERPL-MCNC: 0.9 MG/DL (ref 0.3–1.2)
BUN BLD-MCNC: 30 MG/DL (ref 9–23)
BUN BLD-MCNC: 30 MG/DL (ref 9–23)
BUN/CREAT SERPL: 33.3 (ref 7–25)
C DIFF TOX GENS STL QL NAA+PROBE: DETECTED
CALCIUM SPEC-SCNC: 8.1 MG/DL (ref 8.7–10.4)
CALCIUM SPEC-SCNC: 8.3 MG/DL (ref 8.7–10.4)
CHLORIDE SERPL-SCNC: 108 MMOL/L (ref 99–109)
CHLORIDE SERPL-SCNC: 109 MMOL/L (ref 99–109)
CHOLEST SERPL-MCNC: 69 MG/DL (ref 0–200)
CO2 SERPL-SCNC: 29 MMOL/L (ref 20–31)
CO2 SERPL-SCNC: 30 MMOL/L (ref 20–31)
CREAT BLD-MCNC: 0.8 MG/DL (ref 0.6–1.3)
CREAT BLD-MCNC: 0.9 MG/DL (ref 0.6–1.3)
DEPRECATED RDW RBC AUTO: 50.8 FL (ref 37–54)
ERYTHROCYTE [DISTWIDTH] IN BLOOD BY AUTOMATED COUNT: 17.5 % (ref 11.3–14.5)
GFR SERPL CREATININE-BSD FRML MDRD: 60 ML/MIN/1.73
GLOBULIN UR ELPH-MCNC: 2.3 GM/DL
GLUCOSE BLD-MCNC: 201 MG/DL (ref 70–100)
GLUCOSE BLD-MCNC: 213 MG/DL (ref 70–100)
GLUCOSE BLDC GLUCOMTR-MCNC: 111 MG/DL (ref 70–130)
GLUCOSE BLDC GLUCOMTR-MCNC: 141 MG/DL (ref 70–130)
GLUCOSE BLDC GLUCOMTR-MCNC: 162 MG/DL (ref 70–130)
GLUCOSE BLDC GLUCOMTR-MCNC: 201 MG/DL (ref 70–130)
HCT VFR BLD AUTO: 34.7 % (ref 34.5–44)
HGB BLD-MCNC: 10.5 G/DL (ref 11.5–15.5)
MAGNESIUM SERPL-MCNC: 1.8 MG/DL (ref 1.3–2.7)
MCH RBC QN AUTO: 23.8 PG (ref 27–31)
MCHC RBC AUTO-ENTMCNC: 30.3 G/DL (ref 32–36)
MCV RBC AUTO: 78.5 FL (ref 80–99)
PHOSPHATE SERPL-MCNC: 2.5 MG/DL (ref 2.4–5.1)
PLATELET # BLD AUTO: 162 10*3/MM3 (ref 150–450)
POTASSIUM BLD-SCNC: 3.4 MMOL/L (ref 3.5–5.5)
POTASSIUM BLD-SCNC: 3.8 MMOL/L (ref 3.5–5.5)
PROT SERPL-MCNC: 4.8 G/DL (ref 5.7–8.2)
PROT SERPL-MCNC: 5 G/DL (ref 5.7–8.2)
RBC # BLD AUTO: 4.42 10*6/MM3 (ref 3.89–5.14)
SODIUM BLD-SCNC: 143 MMOL/L (ref 132–146)
SODIUM BLD-SCNC: 145 MMOL/L (ref 132–146)
TRIGL SERPL-MCNC: 92 MG/DL (ref 0–150)
WBC NRBC COR # BLD: 8.38 10*3/MM3 (ref 3.5–10.8)

## 2017-03-25 PROCEDURE — 25010000002 HEPARIN FLUSH (PORCINE) 100 UNIT/ML SOLUTION: Performed by: INTERNAL MEDICINE

## 2017-03-25 PROCEDURE — 25010000002 ENOXAPARIN PER 10 MG: Performed by: INTERNAL MEDICINE

## 2017-03-25 PROCEDURE — 80053 COMPREHEN METABOLIC PANEL: CPT | Performed by: INTERNAL MEDICINE

## 2017-03-25 PROCEDURE — 85027 COMPLETE CBC AUTOMATED: CPT | Performed by: INTERNAL MEDICINE

## 2017-03-25 PROCEDURE — 63710000001 INSULIN LISPRO (HUMAN) PER 5 UNITS: Performed by: INTERNAL MEDICINE

## 2017-03-25 PROCEDURE — 82962 GLUCOSE BLOOD TEST: CPT

## 2017-03-25 PROCEDURE — 3E0G76Z INTRODUCTION OF NUTRITIONAL SUBSTANCE INTO UPPER GI, VIA NATURAL OR ARTIFICIAL OPENING: ICD-10-PCS | Performed by: INTERNAL MEDICINE

## 2017-03-25 PROCEDURE — 25010000002 PIPERACILLIN SOD-TAZOBACTAM PER 1 G

## 2017-03-25 PROCEDURE — 25010000002 PIPERACILLIN SOD-TAZOBACTAM PER 1 G: Performed by: INTERNAL MEDICINE

## 2017-03-25 PROCEDURE — 99233 SBSQ HOSP IP/OBS HIGH 50: CPT | Performed by: INTERNAL MEDICINE

## 2017-03-25 PROCEDURE — 25010000002 FUROSEMIDE PER 20 MG: Performed by: INTERNAL MEDICINE

## 2017-03-25 PROCEDURE — 99232 SBSQ HOSP IP/OBS MODERATE 35: CPT | Performed by: INTERNAL MEDICINE

## 2017-03-25 PROCEDURE — 63710000001 INSULIN DETEMIR PER 5 UNITS: Performed by: INTERNAL MEDICINE

## 2017-03-25 PROCEDURE — 0DH67UZ INSERTION OF FEEDING DEVICE INTO STOMACH, VIA NATURAL OR ARTIFICIAL OPENING: ICD-10-PCS | Performed by: INTERNAL MEDICINE

## 2017-03-25 PROCEDURE — 87493 C DIFF AMPLIFIED PROBE: CPT | Performed by: INTERNAL MEDICINE

## 2017-03-25 RX ORDER — SPIRONOLACTONE 50 MG/1
25 TABLET, FILM COATED ORAL DAILY
Status: DISCONTINUED | OUTPATIENT
Start: 2017-03-25 | End: 2017-04-05 | Stop reason: HOSPADM

## 2017-03-25 RX ORDER — MAGNESIUM SULFATE HEPTAHYDRATE 40 MG/ML
2 INJECTION, SOLUTION INTRAVENOUS AS NEEDED
Status: DISCONTINUED | OUTPATIENT
Start: 2017-03-25 | End: 2017-04-05 | Stop reason: HOSPADM

## 2017-03-25 RX ORDER — FAMOTIDINE 20 MG/1
20 TABLET, FILM COATED ORAL DAILY
Status: DISCONTINUED | OUTPATIENT
Start: 2017-03-25 | End: 2017-03-26

## 2017-03-25 RX ORDER — MAGNESIUM SULFATE HEPTAHYDRATE 40 MG/ML
4 INJECTION, SOLUTION INTRAVENOUS AS NEEDED
Status: DISCONTINUED | OUTPATIENT
Start: 2017-03-25 | End: 2017-04-05 | Stop reason: HOSPADM

## 2017-03-25 RX ORDER — FUROSEMIDE 10 MG/ML
20 INJECTION INTRAMUSCULAR; INTRAVENOUS EVERY 12 HOURS SCHEDULED
Status: COMPLETED | OUTPATIENT
Start: 2017-03-25 | End: 2017-03-26

## 2017-03-25 RX ADMIN — INSULIN DETEMIR 15 UNITS: 100 INJECTION, SOLUTION SUBCUTANEOUS at 21:08

## 2017-03-25 RX ADMIN — SACUBITRIL AND VALSARTAN 1 TABLET: 24; 26 TABLET, FILM COATED ORAL at 17:32

## 2017-03-25 RX ADMIN — SODIUM CHLORIDE, PRESERVATIVE FREE 500 UNITS: 5 INJECTION INTRAVENOUS at 09:36

## 2017-03-25 RX ADMIN — SODIUM CHLORIDE, PRESERVATIVE FREE 500 UNITS: 5 INJECTION INTRAVENOUS at 16:03

## 2017-03-25 RX ADMIN — Medication 250 MG: at 15:25

## 2017-03-25 RX ADMIN — ASPIRIN 81 MG: 81 TABLET, CHEWABLE ORAL at 09:36

## 2017-03-25 RX ADMIN — SODIUM CHLORIDE, PRESERVATIVE FREE 500 UNITS: 5 INJECTION INTRAVENOUS at 20:56

## 2017-03-25 RX ADMIN — POTASSIUM CHLORIDE 40 MEQ: 1.5 POWDER, FOR SOLUTION ORAL at 05:48

## 2017-03-25 RX ADMIN — PANTOPRAZOLE SODIUM 40 MG: 40 INJECTION, POWDER, FOR SOLUTION INTRAVENOUS at 05:59

## 2017-03-25 RX ADMIN — SPIRONOLACTONE 25 MG: 25 TABLET, FILM COATED ORAL at 12:15

## 2017-03-25 RX ADMIN — FUROSEMIDE 20 MG: 10 INJECTION, SOLUTION INTRAMUSCULAR; INTRAVENOUS at 11:08

## 2017-03-25 RX ADMIN — PIPERACILLIN SODIUM,TAZOBACTAM SODIUM 2.25 G: 2; .25 INJECTION, POWDER, FOR SOLUTION INTRAVENOUS at 02:24

## 2017-03-25 RX ADMIN — PIPERACILLIN SODIUM,TAZOBACTAM SODIUM 2.25 G: 2; .25 INJECTION, POWDER, FOR SOLUTION INTRAVENOUS at 07:53

## 2017-03-25 RX ADMIN — ENOXAPARIN SODIUM 40 MG: 40 INJECTION SUBCUTANEOUS at 09:36

## 2017-03-25 RX ADMIN — FAMOTIDINE 20 MG: 20 TABLET ORAL at 11:08

## 2017-03-25 RX ADMIN — CARVEDILOL 6.25 MG: 6.25 TABLET, FILM COATED ORAL at 17:32

## 2017-03-25 RX ADMIN — FUROSEMIDE 20 MG: 10 INJECTION, SOLUTION INTRAMUSCULAR; INTRAVENOUS at 20:56

## 2017-03-25 RX ADMIN — Medication 250 MG: at 17:32

## 2017-03-25 RX ADMIN — LEVOTHYROXINE SODIUM 100 MCG: 100 TABLET ORAL at 05:48

## 2017-03-25 RX ADMIN — POTASSIUM CHLORIDE 40 MEQ: 1.5 POWDER, FOR SOLUTION ORAL at 02:23

## 2017-03-25 RX ADMIN — PIPERACILLIN SODIUM,TAZOBACTAM SODIUM 2.25 G: 2; .25 INJECTION, POWDER, FOR SOLUTION INTRAVENOUS at 13:50

## 2017-03-25 RX ADMIN — PIPERACILLIN SODIUM,TAZOBACTAM SODIUM 2.25 G: 2; .25 INJECTION, POWDER, FOR SOLUTION INTRAVENOUS at 20:54

## 2017-03-25 RX ADMIN — Medication 1 PACKET: at 09:46

## 2017-03-25 RX ADMIN — MAGNESIUM SULFATE HEPTAHYDRATE 4 G: 40 INJECTION, SOLUTION INTRAVENOUS at 02:23

## 2017-03-25 RX ADMIN — INSULIN LISPRO 2 UNITS: 100 INJECTION, SOLUTION INTRAVENOUS; SUBCUTANEOUS at 12:15

## 2017-03-25 RX ADMIN — CARVEDILOL 6.25 MG: 6.25 TABLET, FILM COATED ORAL at 05:48

## 2017-03-25 RX ADMIN — SACUBITRIL AND VALSARTAN 1 TABLET: 24; 26 TABLET, FILM COATED ORAL at 05:48

## 2017-03-26 PROBLEM — A04.72 CLOSTRIDIUM DIFFICILE COLITIS: Status: ACTIVE | Noted: 2017-03-26

## 2017-03-26 LAB
ALBUMIN SERPL-MCNC: 2.2 G/DL (ref 3.2–4.8)
ALBUMIN/GLOB SERPL: 1.1 G/DL (ref 1.5–2.5)
ALP SERPL-CCNC: 432 U/L (ref 25–100)
ALT SERPL W P-5'-P-CCNC: 359 U/L (ref 7–40)
AMMONIA BLD-SCNC: 44 UMOL/L (ref 19–60)
ANION GAP SERPL CALCULATED.3IONS-SCNC: 18 MMOL/L (ref 3–11)
AST SERPL-CCNC: 157 U/L (ref 0–33)
BACTERIA SPEC AEROBE CULT: NORMAL
BACTERIA SPEC AEROBE CULT: NORMAL
BILIRUB SERPL-MCNC: 0.5 MG/DL (ref 0.3–1.2)
BUN BLD-MCNC: 25 MG/DL (ref 9–23)
BUN/CREAT SERPL: 35.7 (ref 7–25)
CALCIUM SPEC-SCNC: 8.3 MG/DL (ref 8.7–10.4)
CHLORIDE SERPL-SCNC: 109 MMOL/L (ref 99–109)
CO2 SERPL-SCNC: 19 MMOL/L (ref 20–31)
CREAT BLD-MCNC: 0.7 MG/DL (ref 0.6–1.3)
DEPRECATED RDW RBC AUTO: 51.3 FL (ref 37–54)
ERYTHROCYTE [DISTWIDTH] IN BLOOD BY AUTOMATED COUNT: 17.8 % (ref 11.3–14.5)
GFR SERPL CREATININE-BSD FRML MDRD: 80 ML/MIN/1.73
GLOBULIN UR ELPH-MCNC: 2 GM/DL
GLUCOSE BLD-MCNC: 104 MG/DL (ref 70–100)
GLUCOSE BLDC GLUCOMTR-MCNC: 118 MG/DL (ref 70–130)
GLUCOSE BLDC GLUCOMTR-MCNC: 123 MG/DL (ref 70–130)
GLUCOSE BLDC GLUCOMTR-MCNC: 141 MG/DL (ref 70–130)
GLUCOSE BLDC GLUCOMTR-MCNC: 95 MG/DL (ref 70–130)
HCT VFR BLD AUTO: 33 % (ref 34.5–44)
HGB BLD-MCNC: 10.1 G/DL (ref 11.5–15.5)
MAGNESIUM SERPL-MCNC: 2.1 MG/DL (ref 1.3–2.7)
MCH RBC QN AUTO: 24.1 PG (ref 27–31)
MCHC RBC AUTO-ENTMCNC: 30.6 G/DL (ref 32–36)
MCV RBC AUTO: 78.8 FL (ref 80–99)
PLATELET # BLD AUTO: 131 10*3/MM3 (ref 150–450)
PMV BLD AUTO: ABNORMAL FL (ref 6–12)
POTASSIUM BLD-SCNC: 3.3 MMOL/L (ref 3.5–5.5)
PROT SERPL-MCNC: 4.2 G/DL (ref 5.7–8.2)
RBC # BLD AUTO: 4.19 10*6/MM3 (ref 3.89–5.14)
SODIUM BLD-SCNC: 146 MMOL/L (ref 132–146)
WBC NRBC COR # BLD: 7.63 10*3/MM3 (ref 3.5–10.8)

## 2017-03-26 PROCEDURE — 83735 ASSAY OF MAGNESIUM: CPT | Performed by: NURSE PRACTITIONER

## 2017-03-26 PROCEDURE — 82140 ASSAY OF AMMONIA: CPT | Performed by: INTERNAL MEDICINE

## 2017-03-26 PROCEDURE — 85027 COMPLETE CBC AUTOMATED: CPT | Performed by: INTERNAL MEDICINE

## 2017-03-26 PROCEDURE — 82465 ASSAY BLD/SERUM CHOLESTEROL: CPT

## 2017-03-26 PROCEDURE — 25010000002 HEPARIN FLUSH (PORCINE) 100 UNIT/ML SOLUTION: Performed by: INTERNAL MEDICINE

## 2017-03-26 PROCEDURE — 80053 COMPREHEN METABOLIC PANEL: CPT

## 2017-03-26 PROCEDURE — 84478 ASSAY OF TRIGLYCERIDES: CPT

## 2017-03-26 PROCEDURE — 99233 SBSQ HOSP IP/OBS HIGH 50: CPT | Performed by: INTERNAL MEDICINE

## 2017-03-26 PROCEDURE — 63710000001 INSULIN DETEMIR PER 5 UNITS: Performed by: INTERNAL MEDICINE

## 2017-03-26 PROCEDURE — 25010000002 FUROSEMIDE PER 20 MG: Performed by: INTERNAL MEDICINE

## 2017-03-26 PROCEDURE — 82962 GLUCOSE BLOOD TEST: CPT

## 2017-03-26 PROCEDURE — 83735 ASSAY OF MAGNESIUM: CPT

## 2017-03-26 PROCEDURE — 25010000003 POTASSIUM CHLORIDE PER 2 MEQ: Performed by: INTERNAL MEDICINE

## 2017-03-26 PROCEDURE — 25010000002 PIPERACILLIN SOD-TAZOBACTAM PER 1 G: Performed by: INTERNAL MEDICINE

## 2017-03-26 PROCEDURE — 84100 ASSAY OF PHOSPHORUS: CPT

## 2017-03-26 PROCEDURE — 80053 COMPREHEN METABOLIC PANEL: CPT | Performed by: INTERNAL MEDICINE

## 2017-03-26 PROCEDURE — 25010000002 ENOXAPARIN PER 10 MG: Performed by: INTERNAL MEDICINE

## 2017-03-26 RX ORDER — FAMOTIDINE 20 MG/1
20 TABLET, FILM COATED ORAL DAILY
Status: DISCONTINUED | OUTPATIENT
Start: 2017-03-27 | End: 2017-03-30

## 2017-03-26 RX ORDER — FAMOTIDINE 10 MG/ML
20 INJECTION, SOLUTION INTRAVENOUS DAILY
Status: DISCONTINUED | OUTPATIENT
Start: 2017-03-26 | End: 2017-03-26

## 2017-03-26 RX ADMIN — MORPHINE SULFATE 5 MG: 100 SOLUTION ORAL at 03:11

## 2017-03-26 RX ADMIN — Medication 9.38 PACKET: at 09:39

## 2017-03-26 RX ADMIN — PIPERACILLIN SODIUM,TAZOBACTAM SODIUM 2.25 G: 2; .25 INJECTION, POWDER, FOR SOLUTION INTRAVENOUS at 13:39

## 2017-03-26 RX ADMIN — PIPERACILLIN SODIUM,TAZOBACTAM SODIUM 2.25 G: 2; .25 INJECTION, POWDER, FOR SOLUTION INTRAVENOUS at 20:32

## 2017-03-26 RX ADMIN — LEVOTHYROXINE SODIUM 100 MCG: 100 TABLET ORAL at 06:01

## 2017-03-26 RX ADMIN — PIPERACILLIN SODIUM,TAZOBACTAM SODIUM 2.25 G: 2; .25 INJECTION, POWDER, FOR SOLUTION INTRAVENOUS at 09:02

## 2017-03-26 RX ADMIN — OXYCODONE AND ACETAMINOPHEN 1 TABLET: 5; 325 TABLET ORAL at 20:33

## 2017-03-26 RX ADMIN — Medication 250 MG: at 17:46

## 2017-03-26 RX ADMIN — SODIUM CHLORIDE, PRESERVATIVE FREE 500 UNITS: 5 INJECTION INTRAVENOUS at 09:06

## 2017-03-26 RX ADMIN — SODIUM CHLORIDE, PRESERVATIVE FREE 500 UNITS: 5 INJECTION INTRAVENOUS at 20:33

## 2017-03-26 RX ADMIN — Medication 250 MG: at 23:37

## 2017-03-26 RX ADMIN — Medication 250 MG: at 12:24

## 2017-03-26 RX ADMIN — CARVEDILOL 6.25 MG: 6.25 TABLET, FILM COATED ORAL at 06:00

## 2017-03-26 RX ADMIN — SACUBITRIL AND VALSARTAN 1 TABLET: 24; 26 TABLET, FILM COATED ORAL at 06:01

## 2017-03-26 RX ADMIN — PIPERACILLIN SODIUM,TAZOBACTAM SODIUM 2.25 G: 2; .25 INJECTION, POWDER, FOR SOLUTION INTRAVENOUS at 02:35

## 2017-03-26 RX ADMIN — ENOXAPARIN SODIUM 40 MG: 40 INJECTION SUBCUTANEOUS at 09:06

## 2017-03-26 RX ADMIN — SACUBITRIL AND VALSARTAN 1 TABLET: 24; 26 TABLET, FILM COATED ORAL at 17:47

## 2017-03-26 RX ADMIN — FUROSEMIDE 20 MG: 10 INJECTION, SOLUTION INTRAMUSCULAR; INTRAVENOUS at 09:06

## 2017-03-26 RX ADMIN — FAMOTIDINE 20 MG: 10 INJECTION, SOLUTION INTRAVENOUS at 09:48

## 2017-03-26 RX ADMIN — POTASSIUM CHLORIDE 20 MEQ: 400 INJECTION, SOLUTION INTRAVENOUS at 07:30

## 2017-03-26 RX ADMIN — Medication 250 MG: at 06:01

## 2017-03-26 RX ADMIN — SPIRONOLACTONE 25 MG: 25 TABLET, FILM COATED ORAL at 09:07

## 2017-03-26 RX ADMIN — OXYCODONE AND ACETAMINOPHEN 1 TABLET: 5; 325 TABLET ORAL at 00:23

## 2017-03-26 RX ADMIN — CARVEDILOL 6.25 MG: 6.25 TABLET, FILM COATED ORAL at 17:46

## 2017-03-26 RX ADMIN — POTASSIUM CHLORIDE 20 MEQ: 400 INJECTION, SOLUTION INTRAVENOUS at 06:00

## 2017-03-26 RX ADMIN — ASPIRIN 81 MG: 81 TABLET, CHEWABLE ORAL at 09:05

## 2017-03-26 RX ADMIN — Medication 250 MG: at 00:23

## 2017-03-26 RX ADMIN — INSULIN DETEMIR 15 UNITS: 100 INJECTION, SOLUTION SUBCUTANEOUS at 21:02

## 2017-03-26 RX ADMIN — FUROSEMIDE 20 MG: 10 INJECTION, SOLUTION INTRAMUSCULAR; INTRAVENOUS at 20:33

## 2017-03-27 LAB
ALBUMIN SERPL-MCNC: 2.3 G/DL (ref 3.2–4.8)
ALBUMIN SERPL-MCNC: 2.4 G/DL (ref 3.2–4.8)
ALBUMIN/GLOB SERPL: 1 G/DL (ref 1.5–2.5)
ALP SERPL-CCNC: 375 U/L (ref 25–100)
ALP SERPL-CCNC: 420 U/L (ref 25–100)
ALT SERPL W P-5'-P-CCNC: 269 U/L (ref 7–40)
ALT SERPL W P-5'-P-CCNC: 296 U/L (ref 7–40)
ANION GAP SERPL CALCULATED.3IONS-SCNC: 7 MMOL/L (ref 3–11)
ANION GAP SERPL CALCULATED.3IONS-SCNC: 7 MMOL/L (ref 3–11)
AST SERPL-CCNC: 117 U/L (ref 0–33)
AST SERPL-CCNC: 125 U/L (ref 0–33)
BILIRUB SERPL-MCNC: 0.5 MG/DL (ref 0.3–1.2)
BILIRUB SERPL-MCNC: 0.6 MG/DL (ref 0.3–1.2)
BUN BLD-MCNC: 22 MG/DL (ref 9–23)
BUN BLD-MCNC: 24 MG/DL (ref 9–23)
BUN/CREAT SERPL: 36.7 (ref 7–25)
CALCIUM SPEC-SCNC: 8.4 MG/DL (ref 8.7–10.4)
CALCIUM SPEC-SCNC: 8.7 MG/DL (ref 8.7–10.4)
CHLORIDE SERPL-SCNC: 110 MMOL/L (ref 99–109)
CHLORIDE SERPL-SCNC: 111 MMOL/L (ref 99–109)
CHOLEST SERPL-MCNC: 69 MG/DL (ref 0–200)
CO2 SERPL-SCNC: 31 MMOL/L (ref 20–31)
CO2 SERPL-SCNC: 31 MMOL/L (ref 20–31)
CREAT BLD-MCNC: 0.6 MG/DL (ref 0.6–1.3)
CREAT BLD-MCNC: 0.6 MG/DL (ref 0.6–1.3)
DEPRECATED RDW RBC AUTO: 49.3 FL (ref 37–54)
ERYTHROCYTE [DISTWIDTH] IN BLOOD BY AUTOMATED COUNT: 17.6 % (ref 11.3–14.5)
GFR SERPL CREATININE-BSD FRML MDRD: 95 ML/MIN/1.73
GLOBULIN UR ELPH-MCNC: 2.5 GM/DL
GLUCOSE BLD-MCNC: 129 MG/DL (ref 70–100)
GLUCOSE BLD-MCNC: 141 MG/DL (ref 70–100)
GLUCOSE BLD-MCNC: 94 MG/DL (ref 70–100)
GLUCOSE BLDC GLUCOMTR-MCNC: 107 MG/DL (ref 70–130)
GLUCOSE BLDC GLUCOMTR-MCNC: 114 MG/DL (ref 70–130)
GLUCOSE BLDC GLUCOMTR-MCNC: 23 MG/DL (ref 70–130)
GLUCOSE BLDC GLUCOMTR-MCNC: 35 MG/DL (ref 70–130)
GLUCOSE BLDC GLUCOMTR-MCNC: 37 MG/DL (ref 70–130)
GLUCOSE BLDC GLUCOMTR-MCNC: 94 MG/DL (ref 70–130)
GLUCOSE BLDC GLUCOMTR-MCNC: 97 MG/DL (ref 70–130)
HCT VFR BLD AUTO: 29.5 % (ref 34.5–44)
HCT VFR BLD AUTO: 34.4 % (ref 34.5–44)
HGB BLD-MCNC: 10.3 G/DL (ref 11.5–15.5)
HGB BLD-MCNC: 9.1 G/DL (ref 11.5–15.5)
MAGNESIUM SERPL-MCNC: 2 MG/DL (ref 1.3–2.7)
MCH RBC QN AUTO: 23.4 PG (ref 27–31)
MCHC RBC AUTO-ENTMCNC: 30.8 G/DL (ref 32–36)
MCV RBC AUTO: 75.8 FL (ref 80–99)
PHOSPHATE SERPL-MCNC: 3.4 MG/DL (ref 2.4–5.1)
PLATELET # BLD AUTO: 146 10*3/MM3 (ref 150–450)
POTASSIUM BLD-SCNC: 3.2 MMOL/L (ref 3.5–5.5)
POTASSIUM BLD-SCNC: 4 MMOL/L (ref 3.5–5.5)
PROT SERPL-MCNC: 4.7 G/DL (ref 5.7–8.2)
PROT SERPL-MCNC: 4.9 G/DL (ref 5.7–8.2)
RBC # BLD AUTO: 3.89 10*6/MM3 (ref 3.89–5.14)
SODIUM BLD-SCNC: 148 MMOL/L (ref 132–146)
SODIUM BLD-SCNC: 149 MMOL/L (ref 132–146)
TRIGL SERPL-MCNC: 108 MG/DL (ref 0–150)
WBC NRBC COR # BLD: 9.08 10*3/MM3 (ref 3.5–10.8)

## 2017-03-27 PROCEDURE — 82947 ASSAY GLUCOSE BLOOD QUANT: CPT

## 2017-03-27 PROCEDURE — 99232 SBSQ HOSP IP/OBS MODERATE 35: CPT | Performed by: INTERNAL MEDICINE

## 2017-03-27 PROCEDURE — 25010000002 LORAZEPAM PER 2 MG: Performed by: INTERNAL MEDICINE

## 2017-03-27 PROCEDURE — 97110 THERAPEUTIC EXERCISES: CPT

## 2017-03-27 PROCEDURE — 85027 COMPLETE CBC AUTOMATED: CPT | Performed by: INTERNAL MEDICINE

## 2017-03-27 PROCEDURE — 63710000001 INSULIN DETEMIR PER 5 UNITS: Performed by: INTERNAL MEDICINE

## 2017-03-27 PROCEDURE — 80053 COMPREHEN METABOLIC PANEL: CPT | Performed by: INTERNAL MEDICINE

## 2017-03-27 PROCEDURE — 25010000002 HEPARIN FLUSH (PORCINE) 100 UNIT/ML SOLUTION: Performed by: INTERNAL MEDICINE

## 2017-03-27 PROCEDURE — 25010000003 POTASSIUM CHLORIDE PER 2 MEQ: Performed by: INTERNAL MEDICINE

## 2017-03-27 PROCEDURE — 99233 SBSQ HOSP IP/OBS HIGH 50: CPT | Performed by: INTERNAL MEDICINE

## 2017-03-27 PROCEDURE — 25010000002 PIPERACILLIN SOD-TAZOBACTAM PER 1 G: Performed by: INTERNAL MEDICINE

## 2017-03-27 PROCEDURE — 85018 HEMOGLOBIN: CPT | Performed by: INTERNAL MEDICINE

## 2017-03-27 PROCEDURE — 85014 HEMATOCRIT: CPT | Performed by: INTERNAL MEDICINE

## 2017-03-27 PROCEDURE — 92612 ENDOSCOPY SWALLOW (FEES) VID: CPT

## 2017-03-27 PROCEDURE — 25010000002 ONDANSETRON PER 1 MG: Performed by: NURSE PRACTITIONER

## 2017-03-27 RX ORDER — DEXTROSE MONOHYDRATE 25 G/50ML
INJECTION, SOLUTION INTRAVENOUS
Status: DISPENSED
Start: 2017-03-27 | End: 2017-03-27

## 2017-03-27 RX ORDER — DEXTROSE MONOHYDRATE 25 G/50ML
25 INJECTION, SOLUTION INTRAVENOUS
Status: DISCONTINUED | OUTPATIENT
Start: 2017-03-27 | End: 2017-04-05 | Stop reason: HOSPADM

## 2017-03-27 RX ORDER — VENLAFAXINE 37.5 MG/1
37.5 TABLET ORAL 2 TIMES DAILY
Status: DISCONTINUED | OUTPATIENT
Start: 2017-03-27 | End: 2017-03-27

## 2017-03-27 RX ORDER — SACCHAROMYCES BOULARDII 250 MG
250 CAPSULE ORAL 2 TIMES DAILY
Status: DISCONTINUED | OUTPATIENT
Start: 2017-03-27 | End: 2017-04-05 | Stop reason: HOSPADM

## 2017-03-27 RX ORDER — VENLAFAXINE 37.5 MG/1
37.5 TABLET ORAL EVERY 12 HOURS SCHEDULED
Status: DISCONTINUED | OUTPATIENT
Start: 2017-03-27 | End: 2017-04-05 | Stop reason: HOSPADM

## 2017-03-27 RX ORDER — NICOTINE POLACRILEX 4 MG
15 LOZENGE BUCCAL
Status: DISCONTINUED | OUTPATIENT
Start: 2017-03-27 | End: 2017-04-05 | Stop reason: HOSPADM

## 2017-03-27 RX ADMIN — Medication 250 MG: at 05:03

## 2017-03-27 RX ADMIN — MORPHINE SULFATE 5 MG: 100 SOLUTION ORAL at 05:03

## 2017-03-27 RX ADMIN — FAMOTIDINE 20 MG: 20 TABLET ORAL at 08:14

## 2017-03-27 RX ADMIN — Medication 250 MG: at 23:56

## 2017-03-27 RX ADMIN — SPIRONOLACTONE 25 MG: 25 TABLET, FILM COATED ORAL at 08:14

## 2017-03-27 RX ADMIN — SACUBITRIL AND VALSARTAN 1 TABLET: 24; 26 TABLET, FILM COATED ORAL at 17:38

## 2017-03-27 RX ADMIN — LORAZEPAM 1 MG: 2 INJECTION, SOLUTION INTRAMUSCULAR; INTRAVENOUS at 23:56

## 2017-03-27 RX ADMIN — Medication 250 MG: at 11:16

## 2017-03-27 RX ADMIN — INSULIN DETEMIR 15 UNITS: 100 INJECTION, SOLUTION SUBCUTANEOUS at 20:05

## 2017-03-27 RX ADMIN — VENLAFAXINE 37.5 MG: 37.5 TABLET ORAL at 20:00

## 2017-03-27 RX ADMIN — CARVEDILOL 6.25 MG: 6.25 TABLET, FILM COATED ORAL at 17:38

## 2017-03-27 RX ADMIN — SACUBITRIL AND VALSARTAN 1 TABLET: 24; 26 TABLET, FILM COATED ORAL at 05:04

## 2017-03-27 RX ADMIN — ONDANSETRON 4 MG: 2 INJECTION INTRAMUSCULAR; INTRAVENOUS at 07:41

## 2017-03-27 RX ADMIN — ONDANSETRON 4 MG: 2 INJECTION INTRAMUSCULAR; INTRAVENOUS at 12:53

## 2017-03-27 RX ADMIN — VENLAFAXINE 37.5 MG: 37.5 TABLET ORAL at 12:11

## 2017-03-27 RX ADMIN — ASPIRIN 81 MG: 81 TABLET, CHEWABLE ORAL at 08:14

## 2017-03-27 RX ADMIN — PIPERACILLIN SODIUM,TAZOBACTAM SODIUM 2.25 G: 2; .25 INJECTION, POWDER, FOR SOLUTION INTRAVENOUS at 01:54

## 2017-03-27 RX ADMIN — DEXTROSE MONOHYDRATE 25 G: 25 INJECTION, SOLUTION INTRAVENOUS at 05:24

## 2017-03-27 RX ADMIN — Medication 250 MG: at 20:00

## 2017-03-27 RX ADMIN — LEVOTHYROXINE SODIUM 100 MCG: 100 TABLET ORAL at 05:04

## 2017-03-27 RX ADMIN — SODIUM CHLORIDE, PRESERVATIVE FREE 500 UNITS: 5 INJECTION INTRAVENOUS at 20:01

## 2017-03-27 RX ADMIN — POTASSIUM CHLORIDE 20 MEQ: 400 INJECTION, SOLUTION INTRAVENOUS at 03:06

## 2017-03-27 RX ADMIN — CARVEDILOL 6.25 MG: 6.25 TABLET, FILM COATED ORAL at 05:04

## 2017-03-27 RX ADMIN — SODIUM CHLORIDE, PRESERVATIVE FREE 500 UNITS: 5 INJECTION INTRAVENOUS at 08:14

## 2017-03-27 RX ADMIN — Medication 250 MG: at 17:38

## 2017-03-27 RX ADMIN — POTASSIUM CHLORIDE 20 MEQ: 400 INJECTION, SOLUTION INTRAVENOUS at 01:55

## 2017-03-27 RX ADMIN — Medication 1 PACKET: at 08:16

## 2017-03-28 LAB
ALBUMIN SERPL-MCNC: 2.8 G/DL (ref 3.2–4.8)
ALBUMIN/GLOB SERPL: 0.8 G/DL (ref 1.5–2.5)
ALP SERPL-CCNC: 405 U/L (ref 25–100)
ALT SERPL W P-5'-P-CCNC: 244 U/L (ref 7–40)
ANION GAP SERPL CALCULATED.3IONS-SCNC: 2 MMOL/L (ref 3–11)
AST SERPL-CCNC: 105 U/L (ref 0–33)
BASOPHILS # BLD AUTO: 0.07 10*3/MM3 (ref 0–0.2)
BASOPHILS NFR BLD AUTO: 0.6 % (ref 0–1)
BILIRUB SERPL-MCNC: 0.5 MG/DL (ref 0.3–1.2)
BUN BLD-MCNC: 21 MG/DL (ref 9–23)
BUN/CREAT SERPL: 35 (ref 7–25)
CALCIUM SPEC-SCNC: 9.7 MG/DL (ref 8.7–10.4)
CHLORIDE SERPL-SCNC: 111 MMOL/L (ref 99–109)
CO2 SERPL-SCNC: 31 MMOL/L (ref 20–31)
CREAT BLD-MCNC: 0.6 MG/DL (ref 0.6–1.3)
DEPRECATED RDW RBC AUTO: 53.7 FL (ref 37–54)
EOSINOPHIL # BLD AUTO: 0.34 10*3/MM3 (ref 0.1–0.3)
EOSINOPHIL NFR BLD AUTO: 2.8 % (ref 0–3)
ERYTHROCYTE [DISTWIDTH] IN BLOOD BY AUTOMATED COUNT: 18.4 % (ref 11.3–14.5)
GFR SERPL CREATININE-BSD FRML MDRD: 95 ML/MIN/1.73
GLOBULIN UR ELPH-MCNC: 3.5 GM/DL
GLUCOSE BLD-MCNC: 53 MG/DL (ref 70–100)
GLUCOSE BLDC GLUCOMTR-MCNC: 150 MG/DL (ref 70–130)
GLUCOSE BLDC GLUCOMTR-MCNC: 42 MG/DL (ref 70–130)
GLUCOSE BLDC GLUCOMTR-MCNC: 81 MG/DL (ref 70–130)
GLUCOSE BLDC GLUCOMTR-MCNC: 88 MG/DL (ref 70–130)
HCT VFR BLD AUTO: 36 % (ref 34.5–44)
HGB BLD-MCNC: 10.6 G/DL (ref 11.5–15.5)
IMM GRANULOCYTES # BLD: 0.09 10*3/MM3 (ref 0–0.03)
IMM GRANULOCYTES NFR BLD: 0.7 % (ref 0–0.6)
LYMPHOCYTES # BLD AUTO: 1.17 10*3/MM3 (ref 0.6–4.8)
LYMPHOCYTES NFR BLD AUTO: 9.7 % (ref 24–44)
MCH RBC QN AUTO: 23.6 PG (ref 27–31)
MCHC RBC AUTO-ENTMCNC: 29.4 G/DL (ref 32–36)
MCV RBC AUTO: 80.2 FL (ref 80–99)
MONOCYTES # BLD AUTO: 1.7 10*3/MM3 (ref 0–1)
MONOCYTES NFR BLD AUTO: 14.1 % (ref 0–12)
NEUTROPHILS # BLD AUTO: 8.7 10*3/MM3 (ref 1.5–8.3)
NEUTROPHILS NFR BLD AUTO: 72.1 % (ref 41–71)
PLATELET # BLD AUTO: 203 10*3/MM3 (ref 150–450)
PMV BLD AUTO: ABNORMAL FL (ref 6–12)
POTASSIUM BLD-SCNC: 4.1 MMOL/L (ref 3.5–5.5)
PROT SERPL-MCNC: 6.3 G/DL (ref 5.7–8.2)
RBC # BLD AUTO: 4.49 10*6/MM3 (ref 3.89–5.14)
SODIUM BLD-SCNC: 144 MMOL/L (ref 132–146)
WBC NRBC COR # BLD: 12.07 10*3/MM3 (ref 3.5–10.8)

## 2017-03-28 PROCEDURE — 85025 COMPLETE CBC W/AUTO DIFF WBC: CPT | Performed by: INTERNAL MEDICINE

## 2017-03-28 PROCEDURE — 99233 SBSQ HOSP IP/OBS HIGH 50: CPT | Performed by: INTERNAL MEDICINE

## 2017-03-28 PROCEDURE — 82962 GLUCOSE BLOOD TEST: CPT

## 2017-03-28 PROCEDURE — 80053 COMPREHEN METABOLIC PANEL: CPT | Performed by: INTERNAL MEDICINE

## 2017-03-28 PROCEDURE — 99232 SBSQ HOSP IP/OBS MODERATE 35: CPT | Performed by: INTERNAL MEDICINE

## 2017-03-28 PROCEDURE — 25010000002 HALOPERIDOL LACTATE PER 5 MG: Performed by: FAMILY MEDICINE

## 2017-03-28 PROCEDURE — 25010000002 HEPARIN FLUSH (PORCINE) 100 UNIT/ML SOLUTION: Performed by: INTERNAL MEDICINE

## 2017-03-28 PROCEDURE — 85007 BL SMEAR W/DIFF WBC COUNT: CPT | Performed by: INTERNAL MEDICINE

## 2017-03-28 RX ORDER — DEXTROSE MONOHYDRATE 25 G/50ML
INJECTION, SOLUTION INTRAVENOUS
Status: DISPENSED
Start: 2017-03-28 | End: 2017-03-28

## 2017-03-28 RX ORDER — HALOPERIDOL 5 MG/ML
0.5 INJECTION INTRAMUSCULAR EVERY 6 HOURS PRN
Status: DISCONTINUED | OUTPATIENT
Start: 2017-03-28 | End: 2017-03-29

## 2017-03-28 RX ADMIN — Medication 1 PACKET: at 08:04

## 2017-03-28 RX ADMIN — SPIRONOLACTONE 25 MG: 25 TABLET, FILM COATED ORAL at 08:03

## 2017-03-28 RX ADMIN — VENLAFAXINE 37.5 MG: 37.5 TABLET ORAL at 08:02

## 2017-03-28 RX ADMIN — Medication 250 MG: at 06:37

## 2017-03-28 RX ADMIN — Medication 250 MG: at 22:54

## 2017-03-28 RX ADMIN — SODIUM CHLORIDE, PRESERVATIVE FREE 500 UNITS: 5 INJECTION INTRAVENOUS at 20:32

## 2017-03-28 RX ADMIN — HALOPERIDOL LACTATE 0.5 MG: 5 INJECTION, SOLUTION INTRAMUSCULAR at 11:23

## 2017-03-28 RX ADMIN — SACUBITRIL AND VALSARTAN 1 TABLET: 24; 26 TABLET, FILM COATED ORAL at 06:36

## 2017-03-28 RX ADMIN — Medication 250 MG: at 18:24

## 2017-03-28 RX ADMIN — CARVEDILOL 6.25 MG: 6.25 TABLET, FILM COATED ORAL at 06:36

## 2017-03-28 RX ADMIN — DEXTROSE MONOHYDRATE 25 G: 25 INJECTION, SOLUTION INTRAVENOUS at 04:38

## 2017-03-28 RX ADMIN — MORPHINE SULFATE 5 MG: 100 SOLUTION ORAL at 11:18

## 2017-03-28 RX ADMIN — FAMOTIDINE 20 MG: 20 TABLET ORAL at 08:05

## 2017-03-28 RX ADMIN — ASPIRIN 81 MG: 81 TABLET, CHEWABLE ORAL at 08:03

## 2017-03-28 RX ADMIN — Medication 250 MG: at 20:32

## 2017-03-28 RX ADMIN — HALOPERIDOL LACTATE 0.5 MG: 5 INJECTION, SOLUTION INTRAMUSCULAR at 22:54

## 2017-03-28 RX ADMIN — LEVOTHYROXINE SODIUM 100 MCG: 100 TABLET ORAL at 06:36

## 2017-03-28 RX ADMIN — SODIUM CHLORIDE, PRESERVATIVE FREE 500 UNITS: 5 INJECTION INTRAVENOUS at 08:04

## 2017-03-28 RX ADMIN — SACUBITRIL AND VALSARTAN 1 TABLET: 24; 26 TABLET, FILM COATED ORAL at 18:26

## 2017-03-28 RX ADMIN — VENLAFAXINE 37.5 MG: 37.5 TABLET ORAL at 20:32

## 2017-03-29 LAB
GLUCOSE BLDC GLUCOMTR-MCNC: 104 MG/DL (ref 70–130)
GLUCOSE BLDC GLUCOMTR-MCNC: 106 MG/DL (ref 70–130)
GLUCOSE BLDC GLUCOMTR-MCNC: 176 MG/DL (ref 70–130)
GLUCOSE BLDC GLUCOMTR-MCNC: 214 MG/DL (ref 70–130)

## 2017-03-29 PROCEDURE — 25010000002 HEPARIN FLUSH (PORCINE) 100 UNIT/ML SOLUTION: Performed by: INTERNAL MEDICINE

## 2017-03-29 PROCEDURE — 82962 GLUCOSE BLOOD TEST: CPT

## 2017-03-29 PROCEDURE — 25010000002 ENOXAPARIN PER 10 MG: Performed by: INTERNAL MEDICINE

## 2017-03-29 PROCEDURE — 99232 SBSQ HOSP IP/OBS MODERATE 35: CPT | Performed by: INTERNAL MEDICINE

## 2017-03-29 PROCEDURE — 63710000001 INSULIN LISPRO (HUMAN) PER 5 UNITS: Performed by: INTERNAL MEDICINE

## 2017-03-29 PROCEDURE — 25010000002 HALOPERIDOL LACTATE PER 5 MG: Performed by: FAMILY MEDICINE

## 2017-03-29 RX ORDER — HALOPERIDOL 5 MG/ML
1 INJECTION INTRAMUSCULAR EVERY 6 HOURS PRN
Status: DISCONTINUED | OUTPATIENT
Start: 2017-03-29 | End: 2017-04-05 | Stop reason: HOSPADM

## 2017-03-29 RX ORDER — MORPHINE SULFATE 100 MG/5ML
10 SOLUTION ORAL
Status: DISPENSED | OUTPATIENT
Start: 2017-03-29 | End: 2017-04-03

## 2017-03-29 RX ADMIN — MORPHINE SULFATE 10 MG: 100 SOLUTION ORAL at 19:28

## 2017-03-29 RX ADMIN — INSULIN LISPRO 2 UNITS: 100 INJECTION, SOLUTION INTRAVENOUS; SUBCUTANEOUS at 22:03

## 2017-03-29 RX ADMIN — ENOXAPARIN SODIUM 40 MG: 40 INJECTION SUBCUTANEOUS at 09:01

## 2017-03-29 RX ADMIN — Medication 250 MG: at 11:39

## 2017-03-29 RX ADMIN — LEVOTHYROXINE SODIUM 100 MCG: 100 TABLET ORAL at 05:10

## 2017-03-29 RX ADMIN — Medication 250 MG: at 22:03

## 2017-03-29 RX ADMIN — SODIUM CHLORIDE, PRESERVATIVE FREE 500 UNITS: 5 INJECTION INTRAVENOUS at 08:28

## 2017-03-29 RX ADMIN — VENLAFAXINE 37.5 MG: 37.5 TABLET ORAL at 22:03

## 2017-03-29 RX ADMIN — CARVEDILOL 6.25 MG: 6.25 TABLET, FILM COATED ORAL at 05:10

## 2017-03-29 RX ADMIN — Medication 250 MG: at 05:10

## 2017-03-29 RX ADMIN — CARVEDILOL 6.25 MG: 6.25 TABLET, FILM COATED ORAL at 17:29

## 2017-03-29 RX ADMIN — VENLAFAXINE 37.5 MG: 37.5 TABLET ORAL at 08:28

## 2017-03-29 RX ADMIN — Medication 250 MG: at 09:01

## 2017-03-29 RX ADMIN — MORPHINE SULFATE 5 MG: 100 SOLUTION ORAL at 05:10

## 2017-03-29 RX ADMIN — ASPIRIN 81 MG: 81 TABLET, CHEWABLE ORAL at 08:27

## 2017-03-29 RX ADMIN — SACUBITRIL AND VALSARTAN 1 TABLET: 24; 26 TABLET, FILM COATED ORAL at 17:29

## 2017-03-29 RX ADMIN — SACUBITRIL AND VALSARTAN 1 TABLET: 24; 26 TABLET, FILM COATED ORAL at 05:10

## 2017-03-29 RX ADMIN — FAMOTIDINE 20 MG: 20 TABLET ORAL at 08:28

## 2017-03-29 RX ADMIN — SPIRONOLACTONE 25 MG: 25 TABLET, FILM COATED ORAL at 08:28

## 2017-03-29 RX ADMIN — HALOPERIDOL LACTATE 1 MG: 5 INJECTION, SOLUTION INTRAMUSCULAR at 22:04

## 2017-03-29 RX ADMIN — SODIUM CHLORIDE, PRESERVATIVE FREE 500 UNITS: 5 INJECTION INTRAVENOUS at 22:04

## 2017-03-29 RX ADMIN — MORPHINE SULFATE 5 MG: 100 SOLUTION ORAL at 01:04

## 2017-03-29 RX ADMIN — Medication 250 MG: at 17:29

## 2017-03-29 RX ADMIN — INSULIN LISPRO 3 UNITS: 100 INJECTION, SOLUTION INTRAVENOUS; SUBCUTANEOUS at 11:39

## 2017-03-30 LAB
ABO GROUP BLD: NORMAL
BLD GP AB SCN SERPL QL: NEGATIVE
DEPRECATED RDW RBC AUTO: 49.1 FL (ref 37–54)
ERYTHROCYTE [DISTWIDTH] IN BLOOD BY AUTOMATED COUNT: 17.6 % (ref 11.3–14.5)
GLUCOSE BLDC GLUCOMTR-MCNC: 115 MG/DL (ref 70–130)
GLUCOSE BLDC GLUCOMTR-MCNC: 150 MG/DL (ref 70–130)
GLUCOSE BLDC GLUCOMTR-MCNC: 285 MG/DL (ref 70–130)
GLUCOSE BLDC GLUCOMTR-MCNC: 90 MG/DL (ref 70–130)
HCT VFR BLD AUTO: 25 % (ref 34.5–44)
HCT VFR BLD AUTO: 27.7 % (ref 34.5–44)
HGB BLD-MCNC: 7.7 G/DL (ref 11.5–15.5)
HGB BLD-MCNC: 8.4 G/DL (ref 11.5–15.5)
MCH RBC QN AUTO: 23.5 PG (ref 27–31)
MCHC RBC AUTO-ENTMCNC: 30.8 G/DL (ref 32–36)
MCV RBC AUTO: 76.5 FL (ref 80–99)
PLATELET # BLD AUTO: 255 10*3/MM3 (ref 150–450)
PMV BLD AUTO: 10.6 FL (ref 6–12)
RBC # BLD AUTO: 3.27 10*6/MM3 (ref 3.89–5.14)
RH BLD: POSITIVE
WBC NRBC COR # BLD: 12.73 10*3/MM3 (ref 3.5–10.8)

## 2017-03-30 PROCEDURE — 25010000002 HEPARIN FLUSH (PORCINE) 100 UNIT/ML SOLUTION: Performed by: INTERNAL MEDICINE

## 2017-03-30 PROCEDURE — 99233 SBSQ HOSP IP/OBS HIGH 50: CPT | Performed by: INTERNAL MEDICINE

## 2017-03-30 PROCEDURE — 86850 RBC ANTIBODY SCREEN: CPT | Performed by: INTERNAL MEDICINE

## 2017-03-30 PROCEDURE — 85027 COMPLETE CBC AUTOMATED: CPT | Performed by: INTERNAL MEDICINE

## 2017-03-30 PROCEDURE — 86901 BLOOD TYPING SEROLOGIC RH(D): CPT | Performed by: INTERNAL MEDICINE

## 2017-03-30 PROCEDURE — 25010000002 ENOXAPARIN PER 10 MG: Performed by: INTERNAL MEDICINE

## 2017-03-30 PROCEDURE — 82962 GLUCOSE BLOOD TEST: CPT

## 2017-03-30 PROCEDURE — 86900 BLOOD TYPING SEROLOGIC ABO: CPT | Performed by: INTERNAL MEDICINE

## 2017-03-30 PROCEDURE — 85014 HEMATOCRIT: CPT | Performed by: INTERNAL MEDICINE

## 2017-03-30 PROCEDURE — 85018 HEMOGLOBIN: CPT | Performed by: INTERNAL MEDICINE

## 2017-03-30 RX ORDER — PANTOPRAZOLE SODIUM 40 MG/10ML
40 INJECTION, POWDER, LYOPHILIZED, FOR SOLUTION INTRAVENOUS EVERY 12 HOURS SCHEDULED
Status: DISCONTINUED | OUTPATIENT
Start: 2017-03-30 | End: 2017-03-31

## 2017-03-30 RX ADMIN — SPIRONOLACTONE 25 MG: 25 TABLET, FILM COATED ORAL at 09:09

## 2017-03-30 RX ADMIN — SACUBITRIL AND VALSARTAN 1 TABLET: 24; 26 TABLET, FILM COATED ORAL at 05:49

## 2017-03-30 RX ADMIN — PANTOPRAZOLE SODIUM 40 MG: 40 INJECTION, POWDER, FOR SOLUTION INTRAVENOUS at 15:00

## 2017-03-30 RX ADMIN — MORPHINE SULFATE 10 MG: 100 SOLUTION ORAL at 05:50

## 2017-03-30 RX ADMIN — Medication 250 MG: at 21:25

## 2017-03-30 RX ADMIN — MORPHINE SULFATE 10 MG: 100 SOLUTION ORAL at 14:43

## 2017-03-30 RX ADMIN — Medication 250 MG: at 05:50

## 2017-03-30 RX ADMIN — Medication 250 MG: at 12:00

## 2017-03-30 RX ADMIN — ASPIRIN 81 MG: 81 TABLET, CHEWABLE ORAL at 09:09

## 2017-03-30 RX ADMIN — CARVEDILOL 6.25 MG: 6.25 TABLET, FILM COATED ORAL at 17:18

## 2017-03-30 RX ADMIN — INSULIN LISPRO 4 UNITS: 100 INJECTION, SOLUTION INTRAVENOUS; SUBCUTANEOUS at 17:22

## 2017-03-30 RX ADMIN — ENOXAPARIN SODIUM 40 MG: 40 INJECTION SUBCUTANEOUS at 09:10

## 2017-03-30 RX ADMIN — PANTOPRAZOLE SODIUM 40 MG: 40 INJECTION, POWDER, FOR SOLUTION INTRAVENOUS at 21:25

## 2017-03-30 RX ADMIN — VENLAFAXINE 37.5 MG: 37.5 TABLET ORAL at 09:09

## 2017-03-30 RX ADMIN — SODIUM CHLORIDE, PRESERVATIVE FREE 500 UNITS: 5 INJECTION INTRAVENOUS at 21:25

## 2017-03-30 RX ADMIN — LEVOTHYROXINE SODIUM 100 MCG: 100 TABLET ORAL at 05:49

## 2017-03-30 RX ADMIN — INSULIN LISPRO 2 UNITS: 100 INJECTION, SOLUTION INTRAVENOUS; SUBCUTANEOUS at 21:25

## 2017-03-30 RX ADMIN — MORPHINE SULFATE 10 MG: 100 SOLUTION ORAL at 00:17

## 2017-03-30 RX ADMIN — SACUBITRIL AND VALSARTAN 1 TABLET: 24; 26 TABLET, FILM COATED ORAL at 17:18

## 2017-03-30 RX ADMIN — VENLAFAXINE 37.5 MG: 37.5 TABLET ORAL at 21:25

## 2017-03-30 RX ADMIN — FAMOTIDINE 20 MG: 20 TABLET ORAL at 09:09

## 2017-03-30 RX ADMIN — Medication 250 MG: at 09:09

## 2017-03-30 RX ADMIN — Medication 250 MG: at 00:17

## 2017-03-30 RX ADMIN — Medication 250 MG: at 17:18

## 2017-03-30 RX ADMIN — SODIUM CHLORIDE, PRESERVATIVE FREE 500 UNITS: 5 INJECTION INTRAVENOUS at 09:10

## 2017-03-31 LAB
GLUCOSE BLDC GLUCOMTR-MCNC: 112 MG/DL (ref 70–130)
GLUCOSE BLDC GLUCOMTR-MCNC: 137 MG/DL (ref 70–130)
GLUCOSE BLDC GLUCOMTR-MCNC: 55 MG/DL (ref 70–130)
GLUCOSE BLDC GLUCOMTR-MCNC: 87 MG/DL (ref 70–130)
GLUCOSE BLDC GLUCOMTR-MCNC: 97 MG/DL (ref 70–130)
HCT VFR BLD AUTO: 29.4 % (ref 34.5–44)
HGB BLD-MCNC: 8.8 G/DL (ref 11.5–15.5)

## 2017-03-31 PROCEDURE — 82962 GLUCOSE BLOOD TEST: CPT

## 2017-03-31 PROCEDURE — 85014 HEMATOCRIT: CPT | Performed by: INTERNAL MEDICINE

## 2017-03-31 PROCEDURE — 25010000002 HEPARIN FLUSH (PORCINE) 100 UNIT/ML SOLUTION: Performed by: INTERNAL MEDICINE

## 2017-03-31 PROCEDURE — 99232 SBSQ HOSP IP/OBS MODERATE 35: CPT | Performed by: INTERNAL MEDICINE

## 2017-03-31 PROCEDURE — 85018 HEMOGLOBIN: CPT | Performed by: INTERNAL MEDICINE

## 2017-03-31 PROCEDURE — 25010000002 ENOXAPARIN PER 10 MG: Performed by: INTERNAL MEDICINE

## 2017-03-31 RX ORDER — PANTOPRAZOLE SODIUM 40 MG/1
40 TABLET, DELAYED RELEASE ORAL
Status: DISCONTINUED | OUTPATIENT
Start: 2017-04-01 | End: 2017-04-05 | Stop reason: HOSPADM

## 2017-03-31 RX ADMIN — LEVOTHYROXINE SODIUM 100 MCG: 100 TABLET ORAL at 05:58

## 2017-03-31 RX ADMIN — SODIUM CHLORIDE, PRESERVATIVE FREE 500 UNITS: 5 INJECTION INTRAVENOUS at 08:25

## 2017-03-31 RX ADMIN — ENOXAPARIN SODIUM 40 MG: 40 INJECTION SUBCUTANEOUS at 08:24

## 2017-03-31 RX ADMIN — SODIUM CHLORIDE, PRESERVATIVE FREE 500 UNITS: 5 INJECTION INTRAVENOUS at 20:55

## 2017-03-31 RX ADMIN — CARVEDILOL 6.25 MG: 6.25 TABLET, FILM COATED ORAL at 17:08

## 2017-03-31 RX ADMIN — Medication 250 MG: at 20:54

## 2017-03-31 RX ADMIN — Medication 250 MG: at 08:25

## 2017-03-31 RX ADMIN — VENLAFAXINE 37.5 MG: 37.5 TABLET ORAL at 08:25

## 2017-03-31 RX ADMIN — Medication 250 MG: at 05:58

## 2017-03-31 RX ADMIN — ASPIRIN 81 MG: 81 TABLET, CHEWABLE ORAL at 08:24

## 2017-03-31 RX ADMIN — SPIRONOLACTONE 25 MG: 25 TABLET, FILM COATED ORAL at 08:25

## 2017-03-31 RX ADMIN — OXYCODONE AND ACETAMINOPHEN 1 TABLET: 5; 325 TABLET ORAL at 20:54

## 2017-03-31 RX ADMIN — OXYCODONE AND ACETAMINOPHEN 1 TABLET: 5; 325 TABLET ORAL at 08:24

## 2017-03-31 RX ADMIN — VENLAFAXINE 37.5 MG: 37.5 TABLET ORAL at 20:54

## 2017-03-31 RX ADMIN — Medication 250 MG: at 00:47

## 2017-03-31 RX ADMIN — SACUBITRIL AND VALSARTAN 1 TABLET: 24; 26 TABLET, FILM COATED ORAL at 05:58

## 2017-03-31 RX ADMIN — Medication 250 MG: at 17:02

## 2017-03-31 RX ADMIN — Medication 250 MG: at 13:13

## 2017-03-31 RX ADMIN — PANTOPRAZOLE SODIUM 40 MG: 40 INJECTION, POWDER, FOR SOLUTION INTRAVENOUS at 08:24

## 2017-03-31 RX ADMIN — SACUBITRIL AND VALSARTAN 1 TABLET: 24; 26 TABLET, FILM COATED ORAL at 17:02

## 2017-04-01 LAB
GLUCOSE BLDC GLUCOMTR-MCNC: 64 MG/DL (ref 70–130)
GLUCOSE BLDC GLUCOMTR-MCNC: 89 MG/DL (ref 70–130)
GLUCOSE BLDC GLUCOMTR-MCNC: 90 MG/DL (ref 70–130)
GLUCOSE BLDC GLUCOMTR-MCNC: 90 MG/DL (ref 70–130)
HCT VFR BLD AUTO: 31.3 % (ref 34.5–44)
HGB BLD-MCNC: 9.2 G/DL (ref 11.5–15.5)

## 2017-04-01 PROCEDURE — 85014 HEMATOCRIT: CPT | Performed by: INTERNAL MEDICINE

## 2017-04-01 PROCEDURE — 99232 SBSQ HOSP IP/OBS MODERATE 35: CPT | Performed by: INTERNAL MEDICINE

## 2017-04-01 PROCEDURE — 85018 HEMOGLOBIN: CPT | Performed by: INTERNAL MEDICINE

## 2017-04-01 PROCEDURE — 25010000002 ENOXAPARIN PER 10 MG: Performed by: INTERNAL MEDICINE

## 2017-04-01 PROCEDURE — 25010000002 HEPARIN FLUSH (PORCINE) 100 UNIT/ML SOLUTION: Performed by: INTERNAL MEDICINE

## 2017-04-01 PROCEDURE — 82962 GLUCOSE BLOOD TEST: CPT

## 2017-04-01 RX ADMIN — SODIUM CHLORIDE, PRESERVATIVE FREE 500 UNITS: 5 INJECTION INTRAVENOUS at 21:00

## 2017-04-01 RX ADMIN — CARVEDILOL 6.25 MG: 6.25 TABLET, FILM COATED ORAL at 05:13

## 2017-04-01 RX ADMIN — Medication 250 MG: at 08:17

## 2017-04-01 RX ADMIN — SPIRONOLACTONE 25 MG: 25 TABLET, FILM COATED ORAL at 08:16

## 2017-04-01 RX ADMIN — OXYCODONE AND ACETAMINOPHEN 1 TABLET: 5; 325 TABLET ORAL at 16:38

## 2017-04-01 RX ADMIN — Medication 250 MG: at 16:38

## 2017-04-01 RX ADMIN — OXYCODONE AND ACETAMINOPHEN 1 TABLET: 5; 325 TABLET ORAL at 21:00

## 2017-04-01 RX ADMIN — CARVEDILOL 6.25 MG: 6.25 TABLET, FILM COATED ORAL at 16:38

## 2017-04-01 RX ADMIN — VENLAFAXINE 37.5 MG: 37.5 TABLET ORAL at 21:00

## 2017-04-01 RX ADMIN — OXYCODONE AND ACETAMINOPHEN 1 TABLET: 5; 325 TABLET ORAL at 05:13

## 2017-04-01 RX ADMIN — Medication 250 MG: at 21:00

## 2017-04-01 RX ADMIN — VENLAFAXINE 37.5 MG: 37.5 TABLET ORAL at 08:15

## 2017-04-01 RX ADMIN — LEVOTHYROXINE SODIUM 100 MCG: 100 TABLET ORAL at 05:13

## 2017-04-01 RX ADMIN — Medication 250 MG: at 11:57

## 2017-04-01 RX ADMIN — Medication 250 MG: at 06:26

## 2017-04-01 RX ADMIN — SODIUM CHLORIDE, PRESERVATIVE FREE 500 UNITS: 5 INJECTION INTRAVENOUS at 08:13

## 2017-04-01 RX ADMIN — Medication 250 MG: at 00:38

## 2017-04-01 RX ADMIN — OXYCODONE AND ACETAMINOPHEN 1 TABLET: 5; 325 TABLET ORAL at 12:40

## 2017-04-01 RX ADMIN — SACUBITRIL AND VALSARTAN 1 TABLET: 24; 26 TABLET, FILM COATED ORAL at 08:16

## 2017-04-01 RX ADMIN — ASPIRIN 81 MG: 81 TABLET, CHEWABLE ORAL at 08:17

## 2017-04-01 RX ADMIN — MORPHINE SULFATE 10 MG: 100 SOLUTION ORAL at 00:38

## 2017-04-01 RX ADMIN — ENOXAPARIN SODIUM 40 MG: 40 INJECTION SUBCUTANEOUS at 08:14

## 2017-04-01 RX ADMIN — MORPHINE SULFATE 10 MG: 100 SOLUTION ORAL at 14:06

## 2017-04-01 RX ADMIN — PANTOPRAZOLE SODIUM 40 MG: 40 TABLET, DELAYED RELEASE ORAL at 05:13

## 2017-04-01 NOTE — PROGRESS NOTES
"      HOSPITALIST DAILY PROGRESS NOTE    Chief Complaint: f/u s/p cardiac arrest    Subjective   SUBJECTIVE/OVERNIGHT EVENTS   No acute events overnight,patient seems more down this morning, not as chatty, otherwise she is without any complain    Review of Systems:  Gen-no fevers, no chills  CV-no chest pain, no palpitations  Resp-no cough, no dyspnea  GI-no N/V/D, no abd pain    Objective   OBJECTIVE   I have reviewed the vital signs.  /57  Pulse 75  Temp 98.2 °F (36.8 °C) (Oral)   Resp 20  Ht 64\" (162.6 cm)  Wt 132 lb 3.2 oz (60 kg)  SpO2 91%  BMI 22.69 kg/m2    Physical Exam:  Gen-no acute distress  CV-RRR, S1 S2 normal, no m/r/g  Resp-CTAB, no wheezes  Abd-soft, NT, ND, +BS  Ext-no edema  Neuro-A&Ox3, no focal deficits  Psych-appropriate mood and flat affect    Results:  I have reviewed the labs,     Results from last 7 days  Lab Units 04/01/17  0517 03/31/17  0233 03/30/17  1345 03/30/17  0600 03/28/17  0352  03/27/17  0323   WBC 10*3/mm3  --   --   --  12.73* 12.07*  --  9.08   HEMOGLOBIN g/dL 9.2* 8.8* 8.4* 7.7* 10.6*  < > 9.1*   HEMATOCRIT % 31.3* 29.4* 27.7* 25.0* 36.0  < > 29.5*   PLATELETS 10*3/mm3  --   --   --  255 203  --  146*   < > = values in this interval not displayed.    Results from last 7 days  Lab Units 03/28/17  0352   SODIUM mmol/L 144   POTASSIUM mmol/L 4.1   CHLORIDE mmol/L 111*   TOTAL CO2 mmol/L 31.0   BUN mg/dL 21   CREATININE mg/dL 0.60   GLUCOSE mg/dL 53*   CALCIUM mg/dL 9.7     I have reviewed the medications.    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)    Clostridium difficile " colitis    89 yof s/p ICU stay for post cardiac arrest and Takosubo cardiomyopathy associated with acute respiratory failure with hypoxia, now with some dysphagia,and suspected GI bleed      Plan  - reported melena 3/29- h/h now stable,  trend h/h, occult stool pending, transfuse if Hg<7  - cardiology were consulted no interventions planned at this time  - continues to struggle with dysphagia- not interested in tube feeds, continue palliative diet  - palliative following, advancing on comfort measures  - continue ppi  - labs in a.m  - DNR      Dispo:anticipate d/c in a few days to likely home/placement with hospice CM/SW following to revisit this 4/3    Eligio Rizvi MD  04/01/17  9:37 AM

## 2017-04-01 NOTE — PLAN OF CARE
Problem: Patient Care Overview (Adult)  Goal: Discharge Needs Assessment  Outcome: Ongoing (interventions implemented as appropriate)    03/21/17 0300 03/21/17 1115 03/23/17 1407   Discharge Needs Assessment   Concerns To Be Addressed --  adjustment to diagnosis/illness concerns --    Readmission Within The Last 30 Days --  no previous admission in last 30 days --    Discharge Disposition --  --  --    Current Health   Anticipated Changes Related to Illness --  inability to care for self --    Living Environment   Transportation Available car;family or friend will provide --  --    Self-Care   Equipment Currently Used at Home --  --  bath bench;walker, rolling;wheelchair     03/29/17 0305   Discharge Needs Assessment   Concerns To Be Addressed --    Readmission Within The Last 30 Days --    Discharge Disposition still a patient  (Palliative care in discussions with family re: GOC)   Current Health   Anticipated Changes Related to Illness --    Living Environment   Transportation Available --    Self-Care   Equipment Currently Used at Home --          Problem: Fall Risk (Adult)  Goal: Absence of Falls  Outcome: Ongoing (interventions implemented as appropriate)

## 2017-04-01 NOTE — PLAN OF CARE
Problem: Patient Care Overview (Adult)  Goal: Plan of Care Review  Outcome: Ongoing (interventions implemented as appropriate)    04/01/17 0605   Outcome Evaluation   Outcome Summary/Follow up Plan Patient rested comfortably throughout the night. VSS. NAD.       Goal: Adult Individualization and Mutuality  Outcome: Ongoing (interventions implemented as appropriate)  Goal: Discharge Needs Assessment  Outcome: Ongoing (interventions implemented as appropriate)    Problem: Fall Risk (Adult)  Goal: Absence of Falls  Outcome: Ongoing (interventions implemented as appropriate)    Problem: Sepsis (Adult)  Goal: Signs and Symptoms of Listed Potential Problems Will be Absent or Manageable (Sepsis)  Outcome: Ongoing (interventions implemented as appropriate)    Problem: Pressure Ulcer Risk (Edinson Scale) (Adult,Obstetrics,Pediatric)  Goal: Skin Integrity  Outcome: Ongoing (interventions implemented as appropriate)

## 2017-04-01 NOTE — PLAN OF CARE
Problem: Patient Care Overview (Adult)  Goal: Plan of Care Review  Outcome: Outcome(s) achieved Date Met:  04/01/17  Goal: Adult Individualization and Mutuality  Outcome: Ongoing (interventions implemented as appropriate)  Goal: Discharge Needs Assessment  Outcome: Ongoing (interventions implemented as appropriate)    Problem: Fall Risk (Adult)  Goal: Absence of Falls  Outcome: Ongoing (interventions implemented as appropriate)    Problem: Sepsis (Adult)  Goal: Signs and Symptoms of Listed Potential Problems Will be Absent or Manageable (Sepsis)  Outcome: Ongoing (interventions implemented as appropriate)    Problem: Pressure Ulcer Risk (Edinson Scale) (Adult,Obstetrics,Pediatric)  Goal: Skin Integrity  Outcome: Ongoing (interventions implemented as appropriate)

## 2017-04-02 PROBLEM — E16.2 HYPOGLYCEMIA: Status: ACTIVE | Noted: 2017-04-02

## 2017-04-02 LAB
GLUCOSE BLDC GLUCOMTR-MCNC: 113 MG/DL (ref 70–130)
GLUCOSE BLDC GLUCOMTR-MCNC: 61 MG/DL (ref 70–130)
GLUCOSE BLDC GLUCOMTR-MCNC: 78 MG/DL (ref 70–130)
GLUCOSE BLDC GLUCOMTR-MCNC: 88 MG/DL (ref 70–130)
GLUCOSE BLDC GLUCOMTR-MCNC: 88 MG/DL (ref 70–130)

## 2017-04-02 PROCEDURE — 99233 SBSQ HOSP IP/OBS HIGH 50: CPT | Performed by: HOSPITALIST

## 2017-04-02 PROCEDURE — 25010000002 ENOXAPARIN PER 10 MG: Performed by: INTERNAL MEDICINE

## 2017-04-02 PROCEDURE — 82962 GLUCOSE BLOOD TEST: CPT

## 2017-04-02 PROCEDURE — 25010000002 HEPARIN FLUSH (PORCINE) 100 UNIT/ML SOLUTION: Performed by: INTERNAL MEDICINE

## 2017-04-02 PROCEDURE — 25010000002 ALTEPLASE 2 MG RECONSTITUTED SOLUTION 1 EACH VIAL: Performed by: HOSPITALIST

## 2017-04-02 RX ORDER — OXYCODONE HYDROCHLORIDE AND ACETAMINOPHEN 5; 325 MG/1; MG/1
1 TABLET ORAL EVERY 4 HOURS PRN
Status: DISCONTINUED | OUTPATIENT
Start: 2017-04-02 | End: 2017-04-05 | Stop reason: HOSPADM

## 2017-04-02 RX ORDER — CYCLOBENZAPRINE HCL 10 MG
5 TABLET ORAL 2 TIMES DAILY PRN
Status: DISCONTINUED | OUTPATIENT
Start: 2017-04-02 | End: 2017-04-05 | Stop reason: HOSPADM

## 2017-04-02 RX ADMIN — CARVEDILOL 6.25 MG: 6.25 TABLET, FILM COATED ORAL at 18:07

## 2017-04-02 RX ADMIN — Medication 250 MG: at 09:09

## 2017-04-02 RX ADMIN — MORPHINE SULFATE 10 MG: 100 SOLUTION ORAL at 23:44

## 2017-04-02 RX ADMIN — WATER 2 MG: 1 INJECTION INTRAMUSCULAR; INTRAVENOUS; SUBCUTANEOUS at 16:57

## 2017-04-02 RX ADMIN — MORPHINE SULFATE 10 MG: 100 SOLUTION ORAL at 18:08

## 2017-04-02 RX ADMIN — OXYCODONE AND ACETAMINOPHEN 1 TABLET: 5; 325 TABLET ORAL at 05:23

## 2017-04-02 RX ADMIN — MORPHINE SULFATE 10 MG: 100 SOLUTION ORAL at 01:21

## 2017-04-02 RX ADMIN — Medication 250 MG: at 12:37

## 2017-04-02 RX ADMIN — OXYCODONE AND ACETAMINOPHEN 1 TABLET: 5; 325 TABLET ORAL at 02:00

## 2017-04-02 RX ADMIN — SODIUM CHLORIDE, PRESERVATIVE FREE 500 UNITS: 5 INJECTION INTRAVENOUS at 20:33

## 2017-04-02 RX ADMIN — Medication 250 MG: at 20:32

## 2017-04-02 RX ADMIN — PANTOPRAZOLE SODIUM 40 MG: 40 TABLET, DELAYED RELEASE ORAL at 05:24

## 2017-04-02 RX ADMIN — SODIUM CHLORIDE, PRESERVATIVE FREE 500 UNITS: 5 INJECTION INTRAVENOUS at 09:10

## 2017-04-02 RX ADMIN — ACETAMINOPHEN 650 MG: 650 SOLUTION ORAL at 13:14

## 2017-04-02 RX ADMIN — OXYCODONE AND ACETAMINOPHEN 1 TABLET: 5; 325 TABLET ORAL at 20:32

## 2017-04-02 RX ADMIN — Medication 250 MG: at 23:44

## 2017-04-02 RX ADMIN — Medication 250 MG: at 18:07

## 2017-04-02 RX ADMIN — LEVOTHYROXINE SODIUM 100 MCG: 100 TABLET ORAL at 05:23

## 2017-04-02 RX ADMIN — VENLAFAXINE 37.5 MG: 37.5 TABLET ORAL at 20:32

## 2017-04-02 RX ADMIN — SACUBITRIL AND VALSARTAN 1 TABLET: 24; 26 TABLET, FILM COATED ORAL at 18:07

## 2017-04-02 RX ADMIN — ASPIRIN 81 MG: 81 TABLET, CHEWABLE ORAL at 09:09

## 2017-04-02 RX ADMIN — Medication 250 MG: at 00:00

## 2017-04-02 RX ADMIN — SPIRONOLACTONE 25 MG: 25 TABLET, FILM COATED ORAL at 09:08

## 2017-04-02 RX ADMIN — CARVEDILOL 6.25 MG: 6.25 TABLET, FILM COATED ORAL at 05:23

## 2017-04-02 RX ADMIN — CYCLOBENZAPRINE HYDROCHLORIDE 5 MG: 10 TABLET, FILM COATED ORAL at 20:32

## 2017-04-02 RX ADMIN — ENOXAPARIN SODIUM 40 MG: 40 INJECTION SUBCUTANEOUS at 09:10

## 2017-04-02 RX ADMIN — MORPHINE SULFATE 10 MG: 100 SOLUTION ORAL at 09:10

## 2017-04-02 NOTE — PLAN OF CARE
Problem: Patient Care Overview (Adult)  Goal: Plan of Care Review  Outcome: Ongoing (interventions implemented as appropriate)    04/02/17 0436   Outcome Evaluation   Outcome Summary/Follow up Plan rested well. pain meds a cpl times were needed. vss. nad noted.       Goal: Adult Individualization and Mutuality  Outcome: Ongoing (interventions implemented as appropriate)  Goal: Discharge Needs Assessment  Outcome: Ongoing (interventions implemented as appropriate)    Problem: Fall Risk (Adult)  Goal: Absence of Falls  Outcome: Ongoing (interventions implemented as appropriate)    Problem: Sepsis (Adult)  Goal: Signs and Symptoms of Listed Potential Problems Will be Absent or Manageable (Sepsis)  Outcome: Ongoing (interventions implemented as appropriate)

## 2017-04-02 NOTE — PLAN OF CARE
Problem: Patient Care Overview (Adult)  Goal: Plan of Care Review  Outcome: Ongoing (interventions implemented as appropriate)    04/02/17 9724   Coping/Psychosocial Response Interventions   Plan Of Care Reviewed With patient   Patient Care Overview   Progress no change

## 2017-04-02 NOTE — PROGRESS NOTES
[facility] Medicine Services  INPATIENT PROGRESS NOTE    Date of Admission: 3/20/2017  Length of Stay: 13  Primary Care Physician: Sergio Garcia MD    Subjective-- HPI/Events overnight/ROS/CC- Hospital follow visit.  Detailed ROS not detailed as performed below      No significant acute events O/N. Doing well. No family present. Patient is pleasantly confused and not able to provide any history. Denies any pain or discomfort. No diarrhea or abd pain. Afebrile.    Objective      Temp:  [98 °F (36.7 °C)-98.8 °F (37.1 °C)] 98.8 °F (37.1 °C)  Heart Rate:  [71-80] 80  Resp:  [19-20] 20  BP: (105-110)/(56-68) 110/68    Physical Exam:  Physical Exam    General Assessment: Alert and pleasantly confused. No acute cardiopulmonary distress. Well developed and well nourished.    HEENT: NCAT, PERRL, MMM    Neck: Supple     CVS: RRR, S1S2 normal, + murmurs    Resp: mild coarse crackles at bases, no adventitious sound    Abd: soft, NT, ND, normal BS, no guarding or peritoneal signs    Ext: No edema, both calves are symmetric and NTTP    Neuro: Nonfocal    Skin: W/D/I. No rash.    Psych: Affect is appropriate    Results Review:    I have reviewed the labs, radiology results and diagnostic studies.      Results from last 7 days  Lab Units 04/01/17  0517  03/30/17  0600   WBC 10*3/mm3  --   --  12.73*   HEMOGLOBIN g/dL 9.2*  < > 7.7*   PLATELETS 10*3/mm3  --   --  255   < > = values in this interval not displayed.    Results from last 7 days  Lab Units 03/28/17  0352   SODIUM mmol/L 144   POTASSIUM mmol/L 4.1   TOTAL CO2 mmol/L 31.0   CREATININE mg/dL 0.60   GLUCOSE mg/dL 53*       Culture Data:  Radiology Data:   None    I have reviewed the medications.        Assessment/Plan     Assessment/Problem List  This is a pleasantly confused F with many chronic medical problems, including Takotsubo cardiomyopathy, who was at OSH for evaluation after a fall, but suffered a cardiac arrest there and transferred  here after successful resuscitation. She was in shock and required 4 vasopressors at OSH per documentation.    Problems:     Acute respiratory failure with hypoxia     - required ventilation support, extubated now      CAD/ Ventricular tachyarrhythmia   -s/p Cardiac arrest & and successful resuscitation at OSH   - medical management per cardiology, stable at this time      Severe shock   - septic vs cardiogenic   - resolved now, but required 4 vasopressors at OSH      Sepsis   - sepsis, not sure the exact etiology, but she does have C diff infection diagnosed on 3/25, not present on admission  and likely associated with abx   - resolved now      Yeast UTI   - Not sure if she got Diflucan or not while in the ICU--my guess would be that she got some since she was critically ill at that time, but does not appear to be having symptoms at this time, so will monitor      Clostridium difficile colitis   - not present on adm and likely due to abx Rx   - stable on PO Vanc/Probiotics      Chronic a-fib   - Rate controlled   - not on AC due to GI bleed      Acute pulmonary edema   - Cont meds, likely due to cardiogenic etiology as mentioned above      Cardiomyopathy (R/O Takotsubo EF 20%)   - meds per cardiology      Elevated LFTs (R/O Shock Liver)   -     Hypothyroidism   - TSH elevated with normal T4, likely due to critical illness   - cont home dose of med and F/U in 4-6 weeks       Hypocalcemia/Hypokalemia   - replete PRN      Toxic metabolic Encephalopathy   - not sure of baseline, no family present today, mildly confused      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 Prognosis guarded, has dysphagia and taking food for comfort. Overall nontoxic and appears to be  stable.  Palliative help appreciated.    I spent more than 30 min on this case, most of my time was spent reviewing chart/diagnostic data. Very complex case.    David Bruner MD   04/02/17   2:03 PM    Please note that portions of this note may have been completed with a voice recognition program. Efforts were made to edit the dictations, but occasionally words are mistranscribed.

## 2017-04-03 LAB
GLUCOSE BLD-MCNC: 179 MG/DL (ref 70–100)
GLUCOSE BLDC GLUCOMTR-MCNC: 166 MG/DL (ref 70–130)
GLUCOSE BLDC GLUCOMTR-MCNC: 172 MG/DL (ref 70–130)
GLUCOSE BLDC GLUCOMTR-MCNC: 212 MG/DL (ref 70–130)
GLUCOSE BLDC GLUCOMTR-MCNC: 33 MG/DL (ref 70–130)
GLUCOSE BLDC GLUCOMTR-MCNC: 34 MG/DL (ref 70–130)
GLUCOSE BLDC GLUCOMTR-MCNC: 34 MG/DL (ref 70–130)
GLUCOSE BLDC GLUCOMTR-MCNC: 379 MG/DL (ref 70–130)
GLUCOSE BLDC GLUCOMTR-MCNC: 40 MG/DL (ref 70–130)
GLUCOSE BLDC GLUCOMTR-MCNC: 43 MG/DL (ref 70–130)
GLUCOSE BLDC GLUCOMTR-MCNC: 50 MG/DL (ref 70–130)
GLUCOSE BLDC GLUCOMTR-MCNC: 61 MG/DL (ref 70–130)
GLUCOSE BLDC GLUCOMTR-MCNC: 66 MG/DL (ref 70–130)
GLUCOSE BLDC GLUCOMTR-MCNC: 69 MG/DL (ref 70–130)

## 2017-04-03 PROCEDURE — 63710000001 INSULIN LISPRO (HUMAN) PER 5 UNITS: Performed by: INTERNAL MEDICINE

## 2017-04-03 PROCEDURE — 25010000002 HEPARIN FLUSH (PORCINE) 100 UNIT/ML SOLUTION: Performed by: INTERNAL MEDICINE

## 2017-04-03 PROCEDURE — 82947 ASSAY GLUCOSE BLOOD QUANT: CPT

## 2017-04-03 PROCEDURE — 25010000002 DIPHENHYDRAMINE PER 50 MG: Performed by: NURSE PRACTITIONER

## 2017-04-03 PROCEDURE — 25010000002 ENOXAPARIN PER 10 MG: Performed by: INTERNAL MEDICINE

## 2017-04-03 PROCEDURE — 99232 SBSQ HOSP IP/OBS MODERATE 35: CPT | Performed by: HOSPITALIST

## 2017-04-03 RX ORDER — DIPHENHYDRAMINE HYDROCHLORIDE 50 MG/ML
25 INJECTION INTRAMUSCULAR; INTRAVENOUS EVERY 6 HOURS PRN
Status: DISCONTINUED | OUTPATIENT
Start: 2017-04-03 | End: 2017-04-05 | Stop reason: HOSPADM

## 2017-04-03 RX ADMIN — Medication 250 MG: at 05:44

## 2017-04-03 RX ADMIN — OXYCODONE AND ACETAMINOPHEN 1 TABLET: 5; 325 TABLET ORAL at 16:34

## 2017-04-03 RX ADMIN — SACUBITRIL AND VALSARTAN 1 TABLET: 24; 26 TABLET, FILM COATED ORAL at 18:11

## 2017-04-03 RX ADMIN — SODIUM CHLORIDE, PRESERVATIVE FREE 500 UNITS: 5 INJECTION INTRAVENOUS at 09:36

## 2017-04-03 RX ADMIN — Medication 250 MG: at 09:29

## 2017-04-03 RX ADMIN — DEXTROSE MONOHYDRATE 25 G: 25 INJECTION, SOLUTION INTRAVENOUS at 15:48

## 2017-04-03 RX ADMIN — LEVOTHYROXINE SODIUM 100 MCG: 100 TABLET ORAL at 05:43

## 2017-04-03 RX ADMIN — ASPIRIN 81 MG: 81 TABLET, CHEWABLE ORAL at 09:30

## 2017-04-03 RX ADMIN — VENLAFAXINE 37.5 MG: 37.5 TABLET ORAL at 22:11

## 2017-04-03 RX ADMIN — MORPHINE SULFATE 10 MG: 100 SOLUTION ORAL at 03:04

## 2017-04-03 RX ADMIN — Medication 250 MG: at 18:12

## 2017-04-03 RX ADMIN — OXYCODONE AND ACETAMINOPHEN 1 TABLET: 5; 325 TABLET ORAL at 22:12

## 2017-04-03 RX ADMIN — ENOXAPARIN SODIUM 40 MG: 40 INJECTION SUBCUTANEOUS at 09:36

## 2017-04-03 RX ADMIN — DEXTROSE MONOHYDRATE 25 G: 25 INJECTION, SOLUTION INTRAVENOUS at 16:26

## 2017-04-03 RX ADMIN — Medication 250 MG: at 23:58

## 2017-04-03 RX ADMIN — VENLAFAXINE 37.5 MG: 37.5 TABLET ORAL at 09:29

## 2017-04-03 RX ADMIN — PANTOPRAZOLE SODIUM 40 MG: 40 TABLET, DELAYED RELEASE ORAL at 05:44

## 2017-04-03 RX ADMIN — SODIUM CHLORIDE, PRESERVATIVE FREE 500 UNITS: 5 INJECTION INTRAVENOUS at 22:12

## 2017-04-03 RX ADMIN — Medication 250 MG: at 12:27

## 2017-04-03 RX ADMIN — Medication 250 MG: at 22:11

## 2017-04-03 RX ADMIN — INSULIN LISPRO 2 UNITS: 100 INJECTION, SOLUTION INTRAVENOUS; SUBCUTANEOUS at 22:12

## 2017-04-03 RX ADMIN — SPIRONOLACTONE 25 MG: 25 TABLET, FILM COATED ORAL at 09:34

## 2017-04-03 RX ADMIN — CARVEDILOL 6.25 MG: 6.25 TABLET, FILM COATED ORAL at 05:43

## 2017-04-03 RX ADMIN — DIPHENHYDRAMINE HYDROCHLORIDE 25 MG: 50 INJECTION INTRAMUSCULAR; INTRAVENOUS at 23:58

## 2017-04-03 RX ADMIN — SACUBITRIL AND VALSARTAN 1 TABLET: 24; 26 TABLET, FILM COATED ORAL at 05:43

## 2017-04-03 NOTE — PROGRESS NOTES
[facility] Medicine Services  INPATIENT PROGRESS NOTE    Date of Admission: 3/20/2017  Length of Stay: 14  Primary Care Physician: Sergio Garcia MD    Subjective-- HPI/Events overnight/ROS/CC- Hospital follow visit.  Detailed ROS not detailed as performed below      No acute events O/N. Pt being fed breakfast and tolerating it. Friends at bedside, but family had just left the room.    Objective      Temp:  [98 °F (36.7 °C)-98.2 °F (36.8 °C)] 98.2 °F (36.8 °C)  Heart Rate:  [70-76] 70  Resp:  [18-20] 20  BP: ()/(45-72) 110/60    Physical Exam:  Physical Exam    General Assessment: No acute cardiopulmonary distress.     CVS: RRR, S1S2 normal, no murmurs    Resp: Coarse BS, resp non-labored    Abd: soft, NT, ND, normal BS, no guarding or peritoneal signs    Ext: No edema, both calves are symmetric and NTTP    Neuro: Nonfocal    Skin: W/D/I. No rash.    Psych: Affect is appropriate      Results Review:    I have reviewed the labs, radiology results and diagnostic studies.      Results from last 7 days  Lab Units 04/01/17  0517  03/30/17  0600   WBC 10*3/mm3  --   --  12.73*   HEMOGLOBIN g/dL 9.2*  < > 7.7*   PLATELETS 10*3/mm3  --   --  255   < > = values in this interval not displayed.    Results from last 7 days  Lab Units 03/28/17  0352   SODIUM mmol/L 144   POTASSIUM mmol/L 4.1   TOTAL CO2 mmol/L 31.0   CREATININE mg/dL 0.60   GLUCOSE mg/dL 53*       Culture Data:  Radiology Data:     I have reviewed the medications.        Assessment/Plan     Assessment/Problem List    This is a pleasantly confused F with many chronic medical problems, including Takotsubo cardiomyopathy, who was at OSH for evaluation after a fall, but suffered a cardiac arrest there and transferred here after successful resuscitation. She was in shock and required 4 vasopressors at OSH per documentation.     Problems:  Acute respiratory failure with hypoxia  - required ventilation support, extubated now     CAD/  Ventricular tachyarrhythmia  -s/p Cardiac arrest & and successful resuscitation at OSH  - medical management per cardiology, stable at this time     Severe shock  - septic vs cardiogenic  - resolved now, but required 4 vasopressors at OSH     Sepsis  - sepsis, not sure the exact etiology, but she does have C diff infection diagnosed on 3/25, not present on admission  and likely associated with abx  - resolved now     Yeast UTI  - Not sure if she got Diflucan or not while in the ICU--my guess would be that she got some since she was critically ill at that time, but does not appear to be having symptoms at this time, so will monitor     Clostridium difficile colitis  - not present on adm and likely due to abx Rx  - stable on PO Vanc/Probiotics     Chronic a-fib  - Rate controlled  - not on AC due to GI bleed     Acute pulmonary edema  - Cont meds, likely due to cardiogenic etiology as mentioned above     Cardiomyopathy (R/O Takotsubo EF 20%)  - meds per cardiology     Elevated LFTs (R/O Shock Liver)  -   Hypothyroidism  - TSH elevated with normal T4, likely due to critical illness  - cont home dose of med and F/U in 4-6 weeks      Hypocalcemia/Hypokalemia  - replete PRN     Toxic metabolic Encephalopathy  - not sure of baseline, no family present today, mildly confused     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 Appreciate Palliative/Hospice service, planning for meeting between Hospice/family today.    DVT prophylaxis: Lovenox     Discharge Planning: TBD. Prognosis guarded, has dysphagia and taking food for comfort. Overall nontoxic and appears to be stable. Family has toured [email or web address].     David Bruner MD   04/03/17   4:13 PM    Please note that portions of this note may  have been completed with a voice recognition program. Efforts were made to edit the dictations, but occasionally words are mistranscribed.

## 2017-04-03 NOTE — PLAN OF CARE
Problem: Patient Care Overview (Adult)  Goal: Plan of Care Review  Outcome: Ongoing (interventions implemented as appropriate)    04/03/17 1215   Coping/Psychosocial Response Interventions   Plan Of Care Reviewed With (pt drowsy, no family present)   Patient Care Overview   Progress declining   Outcome Evaluation   Outcome Summary/Follow up Plan Working toward placement at [place] with Hospice. Palliative following for ongoing GOC.

## 2017-04-03 NOTE — SIGNIFICANT NOTE
Palliative Team Meeting Attendance  13:00  KRISTY Garcia RN, [facility]              DO MEI Genao M.Div., [facility]              DO NADIA Genao, APRN

## 2017-04-03 NOTE — PROGRESS NOTES
Continued Stay Note  [facility]     Patient Name: Neida Burk  MRN: [id number]  Today's Date: 4/3/2017    Admit Date: 3/20/2017          Discharge Plan       04/03/17 1411    Case Management/Social Work Plan    Plan Possible placement    Patient/Family In Agreement With Plan yes    Additional Comments Maryan Lopez called me from [facility]. Patient's son had toured and had lots of questions especially with amount of care facility can provide. Facility answered his questions the best to their ability. Mirtha with hospice to speak with him today.               Discharge Codes     None        Expected Discharge Date and Time     Expected Discharge Date Expected Discharge Time    Apr 4, 2017             Maryan Phelps RN

## 2017-04-03 NOTE — PROGRESS NOTES
Continued Stay Note  [facility]     Patient Name: Neida Burk  MRN: [id number]  Today's Date: 4/3/2017    Admit Date: 3/20/2017          Discharge Plan       04/03/17 1847    Case Management/Social Work Plan    Plan Undetermined    Additional Comments Hospice consult received.  Takosubo cardiomyopathy EF <20% Other co-morbidities.  Hypoglycemia without precipitating insulin dose currently last blood sugar 34 down from 166 earlier in the day.  Will plan to meet with pt/family to discuss options for hospice services as appropriate 4/4/17.  If can be of further assist please contact.....ext [phone].              Discharge Codes     None        Expected Discharge Date and Time     Expected Discharge Date Expected Discharge Time    Apr 4, 2017             Mirtha Ott RN

## 2017-04-03 NOTE — PLAN OF CARE
Problem: Patient Care Overview (Adult)  Goal: Plan of Care Review  Outcome: Ongoing (interventions implemented as appropriate)    04/03/17 0418   Coping/Psychosocial Response Interventions   Plan Of Care Reviewed With patient   Patient Care Overview   Progress declining   Outcome Evaluation   Outcome Summary/Follow up Plan VSS no overnight events. little PO intake. c/o pain and nonverbal cues of pain present--relieved with prn medications. family supportive. FC for EOL care. comfort diet. AND/comfor measures.        Goal: Adult Individualization and Mutuality  Outcome: Ongoing (interventions implemented as appropriate)  Goal: Discharge Needs Assessment  Outcome: Ongoing (interventions implemented as appropriate)    Problem: Fall Risk (Adult)  Goal: Absence of Falls  Outcome: Ongoing (interventions implemented as appropriate)    Problem: Sepsis (Adult)  Goal: Signs and Symptoms of Listed Potential Problems Will be Absent or Manageable (Sepsis)  Outcome: Ongoing (interventions implemented as appropriate)    Problem: Pressure Ulcer Risk (Edisnon Scale) (Adult,Obstetrics,Pediatric)  Goal: Skin Integrity  Outcome: Ongoing (interventions implemented as appropriate)

## 2017-04-04 LAB
GLUCOSE BLDC GLUCOMTR-MCNC: 128 MG/DL (ref 70–130)
GLUCOSE BLDC GLUCOMTR-MCNC: 69 MG/DL (ref 70–130)
GLUCOSE BLDC GLUCOMTR-MCNC: 81 MG/DL (ref 70–130)
GLUCOSE BLDC GLUCOMTR-MCNC: 83 MG/DL (ref 70–130)

## 2017-04-04 PROCEDURE — 25010000002 ENOXAPARIN PER 10 MG: Performed by: INTERNAL MEDICINE

## 2017-04-04 PROCEDURE — 25010000002 DIPHENHYDRAMINE PER 50 MG: Performed by: NURSE PRACTITIONER

## 2017-04-04 PROCEDURE — 25010000002 HALOPERIDOL LACTATE PER 5 MG: Performed by: FAMILY MEDICINE

## 2017-04-04 PROCEDURE — 82962 GLUCOSE BLOOD TEST: CPT

## 2017-04-04 PROCEDURE — 25010000002 HEPARIN FLUSH (PORCINE) 100 UNIT/ML SOLUTION: Performed by: INTERNAL MEDICINE

## 2017-04-04 PROCEDURE — 99232 SBSQ HOSP IP/OBS MODERATE 35: CPT | Performed by: HOSPITALIST

## 2017-04-04 PROCEDURE — 25010000002 ONDANSETRON PER 1 MG: Performed by: NURSE PRACTITIONER

## 2017-04-04 PROCEDURE — 94799 UNLISTED PULMONARY SVC/PX: CPT

## 2017-04-04 RX ORDER — MORPHINE SULFATE 100 MG/5ML
10 SOLUTION ORAL
Status: DISCONTINUED | OUTPATIENT
Start: 2017-04-04 | End: 2017-04-05 | Stop reason: HOSPADM

## 2017-04-04 RX ADMIN — OXYCODONE AND ACETAMINOPHEN 1 TABLET: 5; 325 TABLET ORAL at 10:51

## 2017-04-04 RX ADMIN — SPIRONOLACTONE 25 MG: 25 TABLET, FILM COATED ORAL at 09:33

## 2017-04-04 RX ADMIN — Medication 250 MG: at 18:30

## 2017-04-04 RX ADMIN — PANTOPRAZOLE SODIUM 40 MG: 40 TABLET, DELAYED RELEASE ORAL at 05:32

## 2017-04-04 RX ADMIN — Medication: at 09:32

## 2017-04-04 RX ADMIN — Medication 250 MG: at 11:24

## 2017-04-04 RX ADMIN — Medication: at 21:35

## 2017-04-04 RX ADMIN — MORPHINE SULFATE 10 MG: 100 SOLUTION ORAL at 05:32

## 2017-04-04 RX ADMIN — ONDANSETRON 4 MG: 2 INJECTION INTRAMUSCULAR; INTRAVENOUS at 16:23

## 2017-04-04 RX ADMIN — HALOPERIDOL LACTATE 1 MG: 5 INJECTION, SOLUTION INTRAMUSCULAR at 09:36

## 2017-04-04 RX ADMIN — Medication 250 MG: at 21:34

## 2017-04-04 RX ADMIN — MORPHINE SULFATE 10 MG: 100 SOLUTION ORAL at 21:35

## 2017-04-04 RX ADMIN — Medication 250 MG: at 09:30

## 2017-04-04 RX ADMIN — SACUBITRIL AND VALSARTAN 1 TABLET: 24; 26 TABLET, FILM COATED ORAL at 18:30

## 2017-04-04 RX ADMIN — VENLAFAXINE 37.5 MG: 37.5 TABLET ORAL at 09:30

## 2017-04-04 RX ADMIN — VENLAFAXINE 37.5 MG: 37.5 TABLET ORAL at 21:34

## 2017-04-04 RX ADMIN — DIPHENHYDRAMINE HYDROCHLORIDE 25 MG: 50 INJECTION INTRAMUSCULAR; INTRAVENOUS at 09:35

## 2017-04-04 RX ADMIN — DIPHENHYDRAMINE HYDROCHLORIDE 25 MG: 50 INJECTION INTRAMUSCULAR; INTRAVENOUS at 16:23

## 2017-04-04 RX ADMIN — Medication: at 15:23

## 2017-04-04 RX ADMIN — SACUBITRIL AND VALSARTAN 1 TABLET: 24; 26 TABLET, FILM COATED ORAL at 05:32

## 2017-04-04 RX ADMIN — LEVOTHYROXINE SODIUM 100 MCG: 100 TABLET ORAL at 05:32

## 2017-04-04 RX ADMIN — HALOPERIDOL LACTATE 1 MG: 5 INJECTION, SOLUTION INTRAMUSCULAR at 15:23

## 2017-04-04 RX ADMIN — ENOXAPARIN SODIUM 40 MG: 40 INJECTION SUBCUTANEOUS at 09:33

## 2017-04-04 RX ADMIN — SODIUM CHLORIDE, PRESERVATIVE FREE 500 UNITS: 5 INJECTION INTRAVENOUS at 09:40

## 2017-04-04 RX ADMIN — ASPIRIN 81 MG: 81 TABLET, CHEWABLE ORAL at 09:30

## 2017-04-04 RX ADMIN — MORPHINE SULFATE 10 MG: 100 SOLUTION ORAL at 09:36

## 2017-04-04 RX ADMIN — Medication 250 MG: at 05:32

## 2017-04-04 RX ADMIN — SODIUM CHLORIDE, PRESERVATIVE FREE 500 UNITS: 5 INJECTION INTRAVENOUS at 21:35

## 2017-04-04 NOTE — PLAN OF CARE
Problem: Patient Care Overview (Adult)  Goal: Plan of Care Review  Outcome: Ongoing (interventions implemented as appropriate)    04/03/17 2000 04/04/17 1645   Coping/Psychosocial Response Interventions   Plan Of Care Reviewed With patient --    Patient Care Overview   Progress --  no change   Outcome Evaluation   Outcome Summary/Follow up Plan --  pt rested well, minor restlessness in the am, pain controlled       Goal: Adult Individualization and Mutuality  Outcome: Ongoing (interventions implemented as appropriate)  Goal: Discharge Needs Assessment  Outcome: Ongoing (interventions implemented as appropriate)    Problem: Fall Risk (Adult)  Goal: Absence of Falls  Outcome: Ongoing (interventions implemented as appropriate)    04/03/17 0418   Fall Risk (Adult)   Absence of Falls making progress toward outcome         04/03/17 0418   Fall Risk (Adult)   Absence of Falls making progress toward outcome

## 2017-04-04 NOTE — PROGRESS NOTES
Continued Stay Note  [facility]     Patient Name: Neida Burk  MRN: [id number]  Today's Date: 4/4/2017    Admit Date: 3/20/2017          Discharge Plan       04/04/17 1737    Case Management/Social Work Plan    Plan Undetermined    Additional Comments Hospice consult received. Takosubo Cardiomyopathy EF <20%.  Spoke with son, Hu, by phone.  Plan to meet 4/5/17 to discuss options for hospice services.  Will follow for hospice support and to assist/facilitate access to hospice services.  If can be of further assist please contact....ext [phone].              Discharge Codes     None        Expected Discharge Date and Time     Expected Discharge Date Expected Discharge Time    Apr 5, 2017             Mirtha Ott RN

## 2017-04-04 NOTE — PLAN OF CARE
Problem: Patient Care Overview (Adult)  Goal: Plan of Care Review  Outcome: Ongoing (interventions implemented as appropriate)    04/03/17 1215 04/03/17 2000 04/04/17 0459   Coping/Psychosocial Response Interventions   Plan Of Care Reviewed With --  patient --    Patient Care Overview   Progress declining --  --    Outcome Evaluation   Outcome Summary/Follow up Plan --  --  Working on placement at [place] with Hospice. Palliative following. Oral morphine re-added. Also added palliative rinse for oral care. Very confused during the night.       Goal: Adult Individualization and Mutuality  Outcome: Ongoing (interventions implemented as appropriate)  Goal: Discharge Needs Assessment  Outcome: Ongoing (interventions implemented as appropriate)    Problem: Fall Risk (Adult)  Goal: Absence of Falls  Outcome: Ongoing (interventions implemented as appropriate)    Problem: Sepsis (Adult)  Goal: Signs and Symptoms of Listed Potential Problems Will be Absent or Manageable (Sepsis)  Outcome: Ongoing (interventions implemented as appropriate)    Problem: Pressure Ulcer Risk (Edinson Scale) (Adult,Obstetrics,Pediatric)  Goal: Skin Integrity  Outcome: Ongoing (interventions implemented as appropriate)

## 2017-04-04 NOTE — SIGNIFICANT NOTE
13:00   Palliative Team Member Meeting  Attendance:    Dr. Aldo Mauro, DO Neida Snell, [facility]  Sandrita Perez MDiv,   Radha Hooker, RN, PN, Director  Shamika Garcia RN, PN

## 2017-04-04 NOTE — PROGRESS NOTES
[facility] Medicine Services  INPATIENT PROGRESS NOTE    Date of Admission: 3/20/2017  Length of Stay: 15  Primary Care Physician: Sergio Garcia MD    Subjective-- HPI/Events overnight/ROS/CC- Hospital follow visit.  Detailed ROS not detailed as performed below      Sitting up in bed with friends visiting at bedside. Alert and pleasantly confused. No signs of any acute distress. Tolerating PO intake.     Objective      Temp:  [98.1 °F (36.7 °C)-98.2 °F (36.8 °C)] 98.2 °F (36.8 °C)  Heart Rate:  [78-80] 78  Resp:  [20] 20  BP: ()/(44-58) 123/56    Physical Exam:  Physical Exam    General Assessment: No acute cardiopulmonary distress.      CVS: RRR, S1S2 normal, no murmurs     Resp: Coarse BS, resp non-labored     Abd: soft, NT, ND, normal BS, no guarding or peritoneal signs     Ext: No edema, both calves are symmetric and NTTP     Neuro: Nonfocal     Skin: W/D/I. No rash.     Psych: Affect is appropriate    Results Review:    I have reviewed the labs, radiology results and diagnostic studies.      Results from last 7 days  Lab Units 04/01/17  0517  03/30/17  0600   WBC 10*3/mm3  --   --  12.73*   HEMOGLOBIN g/dL 9.2*  < > 7.7*   PLATELETS 10*3/mm3  --   --  255   < > = values in this interval not displayed.    Results from last 7 days  Lab Units 04/03/17  1702   GLUCOSE mg/dL 179*       Culture Data:  Radiology Data:     I have reviewed the medications.        Assessment/Plan     Assessment/Problem List    This is a pleasantly confused F with many chronic medical problems, including Takotsubo cardiomyopathy, who was at OSH for evaluation after a fall, but suffered a cardiac arrest there and transferred here after successful resuscitation. She was in shock and required 4 vasopressors at OSH per documentation.      Problems:  Acute respiratory failure with hypoxia  - required ventilation support, extubated now      CAD/ Ventricular tachyarrhythmia  -s/p Cardiac arrest & and  "successful resuscitation at OSH  - medical management per cardiology, stable at this time      Severe shock  - septic vs cardiogenic  - resolved now, but required 4 vasopressors at OSH      Sepsis  - sepsis, not sure the exact etiology, but she does have C diff infection diagnosed on 3/25, not present on admission and likely associated with abx  - resolved now      Yeast UTI  - Not sure if she got Diflucan or not while in the ICU--my guess would be that she got some since she was critically ill at that time, but does not appear to be having symptoms at this time, so will monitor      Clostridium difficile colitis  - not present on adm and likely due to abx Rx  - stable on PO Vanc/Probiotics      Chronic a-fib  - Rate controlled  - not on AC due to GI bleed      Acute pulmonary edema  - Cont meds, likely due to cardiogenic etiology as mentioned above      Cardiomyopathy (R/O Takotsubo EF 20%)  - meds per cardiology      Elevated LFTs (R/O Shock Liver)  -   Hypothyroidism  - TSH elevated with normal T4, likely due to critical illness  - cont home dose of med and F/U in 4-6 weeks       Hypocalcemia/Hypokalemia  - replete PRN      Toxic metabolic Encephalopathy  - not sure of baseline, no family present today, mildly confused      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 Appreciate Palliative/Hospice service, planning for meeting between Hospice/family today.     DVT prophylaxis: Lovenox      Discharge Planning: TBD. Prognosis guarded, has dysphagia and taking food for comfort. Appears to tolerate food, but was profoundly hypoglycemic yesterday per RN, but \"not symptomatic.\".  Overall nontoxic and appears to be stable. Hospice to meet with son today. Family has toured [facility]. " Nothing new to add. Cont with current therapy.        David Bruner MD   04/04/17   1:14 PM    Please note that portions of this note may have been completed with a voice recognition program. Efforts were made to edit the dictations, but occasionally words are mistranscribed.

## 2017-04-04 NOTE — PLAN OF CARE
Problem: Patient Care Overview (Adult)  Goal: Plan of Care Review  Outcome: Ongoing (interventions implemented as appropriate)    04/04/17 1200   Coping/Psychosocial Response Interventions   Plan Of Care Reviewed With patient;son   Patient Care Overview   Progress no change   Outcome Evaluation   Outcome Summary/Follow up Plan Patient sitting up in a chair, c/o headache, ate 25% of breakfast, son requested to go to the [facility] as self pay and have Hospice Support. Dr. Mauro notified Mirtha Ott, Hospice Nurse/

## 2017-04-05 VITALS
WEIGHT: 132.2 LBS | BODY MASS INDEX: 22.57 KG/M2 | HEIGHT: 64 IN | HEART RATE: 80 BPM | OXYGEN SATURATION: 94 % | RESPIRATION RATE: 20 BRPM | TEMPERATURE: 97.8 F | DIASTOLIC BLOOD PRESSURE: 62 MMHG | SYSTOLIC BLOOD PRESSURE: 104 MMHG

## 2017-04-05 PROBLEM — J81.0 ACUTE PULMONARY EDEMA (HCC): Status: RESOLVED | Noted: 2017-03-21 | Resolved: 2017-04-05

## 2017-04-05 PROBLEM — A41.9 SEPSIS (HCC): Status: RESOLVED | Noted: 2017-01-10 | Resolved: 2017-04-05

## 2017-04-05 PROBLEM — I47.20 VENTRICULAR TACHYARRHYTHMIA (HCC): Status: RESOLVED | Noted: 2017-03-21 | Resolved: 2017-04-05

## 2017-04-05 PROBLEM — R57.9 SHOCK (HCC): Status: RESOLVED | Noted: 2017-03-21 | Resolved: 2017-04-05

## 2017-04-05 PROBLEM — J96.01 ACUTE RESPIRATORY FAILURE WITH HYPOXIA (HCC): Status: RESOLVED | Noted: 2017-01-10 | Resolved: 2017-04-05

## 2017-04-05 LAB
GLUCOSE BLD-MCNC: 80 MG/DL (ref 70–100)
GLUCOSE BLDC GLUCOMTR-MCNC: 12 MG/DL (ref 70–130)
GLUCOSE BLDC GLUCOMTR-MCNC: 13 MG/DL (ref 70–130)
GLUCOSE BLDC GLUCOMTR-MCNC: 14 MG/DL (ref 70–130)
GLUCOSE BLDC GLUCOMTR-MCNC: 74 MG/DL (ref 70–130)

## 2017-04-05 PROCEDURE — 25010000002 HEPARIN FLUSH (PORCINE) 100 UNIT/ML SOLUTION: Performed by: INTERNAL MEDICINE

## 2017-04-05 PROCEDURE — 94799 UNLISTED PULMONARY SVC/PX: CPT

## 2017-04-05 PROCEDURE — 82947 ASSAY GLUCOSE BLOOD QUANT: CPT

## 2017-04-05 PROCEDURE — 99239 HOSP IP/OBS DSCHRG MGMT >30: CPT | Performed by: PHYSICIAN ASSISTANT

## 2017-04-05 PROCEDURE — 25010000002 ENOXAPARIN PER 10 MG: Performed by: INTERNAL MEDICINE

## 2017-04-05 PROCEDURE — 25010000002 HALOPERIDOL LACTATE PER 5 MG: Performed by: FAMILY MEDICINE

## 2017-04-05 PROCEDURE — 25010000002 DIPHENHYDRAMINE PER 50 MG: Performed by: NURSE PRACTITIONER

## 2017-04-05 RX ORDER — MORPHINE SULFATE 100 MG/5ML
10 SOLUTION ORAL
Qty: 15 ML | Refills: 0 | Status: SHIPPED | OUTPATIENT
Start: 2017-04-05 | End: 2017-04-08

## 2017-04-05 RX ORDER — LEVOTHYROXINE SODIUM 0.1 MG/1
100 TABLET ORAL DAILY
Start: 2017-04-05

## 2017-04-05 RX ORDER — SACCHAROMYCES BOULARDII 250 MG
250 CAPSULE ORAL 2 TIMES DAILY
Start: 2017-04-05

## 2017-04-05 RX ORDER — VENLAFAXINE 37.5 MG/1
37.5 TABLET ORAL EVERY 12 HOURS SCHEDULED
Start: 2017-04-05

## 2017-04-05 RX ORDER — IPRATROPIUM BROMIDE AND ALBUTEROL SULFATE 2.5; .5 MG/3ML; MG/3ML
3 SOLUTION RESPIRATORY (INHALATION) EVERY 6 HOURS PRN
Qty: 360 ML
Start: 2017-04-05

## 2017-04-05 RX ORDER — ONDANSETRON 4 MG/1
4 TABLET, FILM COATED ORAL EVERY 6 HOURS PRN
Refills: 0
Start: 2017-04-05

## 2017-04-05 RX ORDER — OXYCODONE HYDROCHLORIDE AND ACETAMINOPHEN 5; 325 MG/1; MG/1
1 TABLET ORAL EVERY 4 HOURS PRN
Qty: 18 TABLET | Refills: 0 | Status: SHIPPED | OUTPATIENT
Start: 2017-04-05 | End: 2017-04-08

## 2017-04-05 RX ORDER — CARVEDILOL 6.25 MG/1
6.25 TABLET ORAL EVERY 12 HOURS SCHEDULED
Start: 2017-04-05

## 2017-04-05 RX ORDER — SPIRONOLACTONE 25 MG/1
25 TABLET ORAL DAILY
Start: 2017-04-05

## 2017-04-05 RX ORDER — CYCLOBENZAPRINE HCL 5 MG
5 TABLET ORAL 2 TIMES DAILY PRN
Refills: 0
Start: 2017-04-05

## 2017-04-05 RX ADMIN — DEXTROSE MONOHYDRATE 25 G: 25 INJECTION, SOLUTION INTRAVENOUS at 08:08

## 2017-04-05 RX ADMIN — Medication 250 MG: at 12:29

## 2017-04-05 RX ADMIN — DIPHENHYDRAMINE HYDROCHLORIDE 25 MG: 50 INJECTION INTRAMUSCULAR; INTRAVENOUS at 12:29

## 2017-04-05 RX ADMIN — ENOXAPARIN SODIUM 40 MG: 40 INJECTION SUBCUTANEOUS at 08:55

## 2017-04-05 RX ADMIN — DEXTROSE MONOHYDRATE 25 G: 25 INJECTION, SOLUTION INTRAVENOUS at 08:30

## 2017-04-05 RX ADMIN — Medication: at 14:05

## 2017-04-05 RX ADMIN — SODIUM CHLORIDE, PRESERVATIVE FREE 500 UNITS: 5 INJECTION INTRAVENOUS at 08:54

## 2017-04-05 RX ADMIN — Medication 250 MG: at 05:39

## 2017-04-05 RX ADMIN — Medication 250 MG: at 00:29

## 2017-04-05 RX ADMIN — PANTOPRAZOLE SODIUM 40 MG: 40 TABLET, DELAYED RELEASE ORAL at 05:39

## 2017-04-05 RX ADMIN — LEVOTHYROXINE SODIUM 100 MCG: 100 TABLET ORAL at 05:39

## 2017-04-05 RX ADMIN — HALOPERIDOL LACTATE 1 MG: 5 INJECTION, SOLUTION INTRAMUSCULAR at 14:05

## 2017-04-05 RX ADMIN — Medication: at 03:45

## 2017-04-05 RX ADMIN — DIPHENHYDRAMINE HYDROCHLORIDE 12.5 MG: 50 INJECTION INTRAMUSCULAR; INTRAVENOUS at 05:40

## 2017-04-05 RX ADMIN — MORPHINE SULFATE 10 MG: 100 SOLUTION ORAL at 15:20

## 2017-04-05 RX ADMIN — Medication: at 08:55

## 2017-04-05 RX ADMIN — NYSTATIN 500000 UNITS: 100000 SUSPENSION ORAL at 15:15

## 2017-04-05 NOTE — SIGNIFICANT NOTE
Palliative Team Meeting  Dr. Aldo Snell, VANNA Haro, VANNA Perez,   Radha Hooker, RN  Mirtha Ott, JEANINE/ CM Hospice

## 2017-04-05 NOTE — PROGRESS NOTES
Continued Stay Note  [facility]     Patient Name: Neida Burk  MRN: [id number]  Today's Date: 4/5/2017    Admit Date: 3/20/2017          Discharge Plan       04/05/17 1721    Case Management/Social Work Plan    Plan [facility] with [facility] team services    Patient/Family In Agreement With Plan yes    Final Note    Final Note Chart reviewed.  Awake.  Pleasantly confused.  Spoke with son, Hu Burk, by phone.  Discussed current status and goals of care.  Explained hospice services in the [facility].  Is agreeable.  Referral to [facility].  Medical records faxed and update to Deja Chauhan [facility] Admission Specialist.  Son given contact number for hospice on pt's arrival at the NH.  Transferred by [facility] ambulance arranged per RN Case Manager, Maryan Phelps.              Discharge Codes     None        Expected Discharge Date and Time     Expected Discharge Date Expected Discharge Time    Apr 5, 2017             Mirtha Ott RN

## 2017-04-05 NOTE — SIGNIFICANT NOTE
Palliative Team Meeting  Dr. Aldo Snell, VANNA Haro, VANNA Peerz,   Radha Hooker, RN  Mirtha Ott, JEANINE/ CM Hospice

## 2017-04-05 NOTE — PLAN OF CARE
Problem: Patient Care Overview (Adult)  Goal: Plan of Care Review  Outcome: Ongoing (interventions implemented as appropriate)    04/04/17 1645 04/04/17 2000 04/05/17 0507   Coping/Psychosocial Response Interventions   Plan Of Care Reviewed With --  patient;family --    Patient Care Overview   Progress no change --  --    Outcome Evaluation   Outcome Summary/Follow up Plan --  --  Patient resting on and off. Paiin controlled with medication. Very confused.       Goal: Adult Individualization and Mutuality  Outcome: Ongoing (interventions implemented as appropriate)  Goal: Discharge Needs Assessment  Outcome: Ongoing (interventions implemented as appropriate)    Problem: Fall Risk (Adult)  Goal: Absence of Falls  Outcome: Ongoing (interventions implemented as appropriate)    Problem: Sepsis (Adult)  Goal: Signs and Symptoms of Listed Potential Problems Will be Absent or Manageable (Sepsis)  Outcome: Ongoing (interventions implemented as appropriate)    Problem: Pressure Ulcer Risk (Edinson Scale) (Adult,Obstetrics,Pediatric)  Goal: Skin Integrity  Outcome: Ongoing (interventions implemented as appropriate)

## 2017-04-05 NOTE — DISCHARGE PLACEMENT REQUEST
"Luis Burk (83 y.o. Female)     From Maryan Phelps RN,    [phone]    Date of Birth Social Security Number Address Home Phone MRN    11/23/1933  [address]  [facility] [id number] [phone] [id number]    Sabianist Marital Status          Protestant        Admission Date Admission Type Admitting Provider Attending Provider Department, Room/Bed    3/20/17 Urgent David Bruner MD Khamvanthong, Phonekeo, MD [address] GYN, N543/1    Discharge Date Discharge Disposition Discharge Destination         Skilled Nursing Facility (DC - External)             Attending Provider: David Bruner MD     Allergies:  Codeine, Lortab [Hydrocodone-acetaminophen]    Isolation:  Spore   Infection:  C.difficile (03/25/17), MRSA (09/29/16)   Code Status:  Comfort Yu    Ht:  64\" (162.6 cm)   Wt:  132 lb 3.2 oz (60 kg)    Admission Cmt:  None   Principal Problem:  S/P Cardiac arrest [I46.9]                 Active Insurance as of 3/20/2017     Primary Coverage     Payor Plan Insurance Group Employer/Plan Group    MEDICARE MEDICARE A & B      Payor Plan Address Payor Plan Phone Number Effective From Effective To    [address] [phone] 11/1/1998     [place] [address]       Subscriber Name Subscriber Birth Date Member ID       LUIS BURK 11/23/1933 [id number]           Secondary Coverage     Payor Plan Insurance Group Employer/Plan Group    [facility] SUPP [id number]     Payor Plan Address Payor Plan Phone Number Effective From Effective To    [address]  1/1/2013     [place] [address]       Subscriber Name Subscriber Birth Date Member ID       LUIS BURK 11/23/1933 [id number]                 Emergency Contacts      (Rel.) Home Phone Work Phone Mobile Phone    WabenoHu dunlap (Son) -- -- [phone]    Sheyla Lara (Care Giver) [phone] -- --            Insurance Information                MEDICARE/MEDICARE A & " B Phone: [phone]    Subscriber: Neida Burk Subscriber#: [id number]    Group#:  Precert#:         DREW BLUE CROSS[employer]ANTHSUNNY BC  SUPP Phone:     Subscriber: Neida Burk Subscriber#: [id number]    Group#: [id number] Precert#:              Physician Progress Notes (most recent note)      David Bruner MD at 4/4/2017  1:14 PM  Version 1 of 1             [facility] Medicine Services  INPATIENT PROGRESS NOTE    Date of Admission: 3/20/2017  Length of Stay: 15  Primary Care Physician: Sergio Garcia MD    Subjective-- HPI/Events overnight/ROS/CC- Hospital follow visit.  Detailed ROS not detailed as performed below      Sitting up in bed with friends visiting at bedside. Alert and pleasantly confused. No signs of any acute distress. Tolerating PO intake.     Objective      Temp:  [98.1 °F (36.7 °C)-98.2 °F (36.8 °C)] 98.2 °F (36.8 °C)  Heart Rate:  [78-80] 78  Resp:  [20] 20  BP: ()/(44-58) 123/56    Physical Exam:  Physical Exam    General Assessment: No acute cardiopulmonary distress.      CVS: RRR, S1S2 normal, no murmurs     Resp: Coarse BS, resp non-labored     Abd: soft, NT, ND, normal BS, no guarding or peritoneal signs     Ext: No edema, both calves are symmetric and NTTP     Neuro: Nonfocal     Skin: W/D/I. No rash.     Psych: Affect is appropriate    Results Review:    I have reviewed the labs, radiology results and diagnostic studies.      Results from last 7 days  Lab Units 04/01/17  0517  03/30/17  0600   WBC 10*3/mm3  --   --  12.73*   HEMOGLOBIN g/dL 9.2*  < > 7.7*   PLATELETS 10*3/mm3  --   --  255   < > = values in this interval not displayed.    Results from last 7 days  Lab Units 04/03/17  1702   GLUCOSE mg/dL 179*       Culture Data:  Radiology Data:     I have reviewed the medications.        Assessment/Plan     Assessment/Problem List    This is a pleasantly confused F with many chronic medical problems, including Takotsubo cardiomyopathy, who was  at OSH for evaluation after a fall, but suffered a cardiac arrest there and transferred here after successful resuscitation. She was in shock and required 4 vasopressors at OSH per documentation.      Problems:  Acute respiratory failure with hypoxia  - required ventilation support, extubated now      CAD/ Ventricular tachyarrhythmia  -s/p Cardiac arrest & and successful resuscitation at OSH  - medical management per cardiology, stable at this time      Severe shock  - septic vs cardiogenic  - resolved now, but required 4 vasopressors at OSH      Sepsis  - sepsis, not sure the exact etiology, but she does have C diff infection diagnosed on 3/25, not present on admission and likely associated with abx  - resolved now      Yeast UTI  - Not sure if she got Diflucan or not while in the ICU--my guess would be that she got some since she was critically ill at that time, but does not appear to be having symptoms at this time, so will monitor      Clostridium difficile colitis  - not present on adm and likely due to abx Rx  - stable on PO Vanc/Probiotics      Chronic a-fib  - Rate controlled  - not on AC due to GI bleed      Acute pulmonary edema  - Cont meds, likely due to cardiogenic etiology as mentioned above      Cardiomyopathy (R/O Takotsubo EF 20%)  - meds per cardiology      Elevated LFTs (R/O Shock Liver)  -   Hypothyroidism  - TSH elevated with normal T4, likely due to critical illness  - cont home dose of med and F/U in 4-6 weeks       Hypocalcemia/Hypokalemia  - replete PRN      Toxic metabolic Encephalopathy  - not sure of baseline, no family present today, mildly confused      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 Appreciate Palliative/Hospice service, planning for  "meeting between Hospice/family today.     DVT prophylaxis: Lovenox      Discharge Planning: TBD. Prognosis guarded, has dysphagia and taking food for comfort. Appears to tolerate food, but was profoundly hypoglycemic yesterday per RN, but \"not symptomatic.\".  Overall nontoxic and appears to be stable. Hospice to meet with son today. Family has toured [facility]. Nothing new to add. Cont with current therapy.        David Bruner MD   04/04/17   1:14 PM    Please note that portions of this note may have been completed with a voice recognition program. Efforts were made to edit the dictations, but occasionally words are mistranscribed.         Electronically signed by David Bruner MD at 4/4/2017  1:19 PM           Consult Notes (most recent note)      Aldo Mauro DO at 3/27/2017  4:24 PM  Version 1 of 1     Consult Orders:    1. Inpatient Consult to Palliative Care MD [[id number]] ordered by Camden Raymond MD at 03/27/17 1204                Sergio Garcia MD  Consulting physician: Mandi    No chief complaint on file.        HPI: Patient currently states that she is having some chest pain but feels that it is starting to improve.  Nursing staff reports that the patient is somewhat confused which seemed to get worse at nighttime.      Past Medical History:   Diagnosis Date   • Back pain    • Chronic a-fib    • CVA (cerebral vascular accident)     about 2-3 years ago, residual is slight gait problems   • Gastrointestinal hemorrhage    • GERD (gastroesophageal reflux disease)    • Guillain Barré syndrome    • Hypothyroid    • Malignant melanoma    • Melanoma    • MRSA (methicillin resistant Staphylococcus aureus)    • Ulcer of esophagus      Past Surgical History:   Procedure Laterality Date   • BACK SURGERY      x3    • ELBOW PROCEDURE     • ENDOSCOPY N/A 12/3/2016    Procedure: ESOPHAGOGASTRODUODENOSCOPY AT BEDSIDE;  Surgeon: Chandler Maher MD;  Location: [facility] ENDOSCOPY;  " Service:    • HIP ARTHROPLASTY Right     Had complications with bone fx and anisha, 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 and wears shoes with elevated soles   • KNEE SURGERY     • SKIN CANCER EXCISION       Current Facility-Administered Medications   Medication Dose Route Frequency Provider Last Rate Last Dose   • acetaminophen (TYLENOL) 160 MG/5ML solution 650 mg  650 mg Oral Q4H PRN Lizandro Almanzar MD   650 mg at 03/23/17 1121   • acetaminophen (TYLENOL) tablet 650 mg  650 mg Oral Q4H PRN VANNA Santamaria        Or   • acetaminophen (TYLENOL) suppository 650 mg  650 mg Rectal Q4H PRN VANNA Santamaria   650 mg at 03/21/17 0947   • aspirin chewable tablet 81 mg  81 mg Oral Daily Lizandro Almanzar MD   81 mg at 03/27/17 0814   • calcium gluconate 1 g in sodium chloride 0.9 % 100 mL IVPB  1 g Intravenous Once PRN VANNA Santamaria        And   • calcium gluconate 3 g in sodium chloride 0.9 % 500 mL IVPB  3 g Intravenous Once PRN VANNA Santamaria       • carvedilol (COREG) tablet 6.25 mg  6.25 mg Oral Q12H Lizandro Almanzar MD   6.25 mg at 03/27/17 0504   • dextrose (D50W) 50 % solution  - ADS Override Pull            • dextrose (D50W) solution 25 g  25 g Intravenous Q15 Min PRN VANNA Sykes   25 g at 03/27/17 0524   • dextrose (GLUTOSE) oral gel 15 g  15 g Oral Q15 Min PRN VANNA Sykes       • enoxaparin (LOVENOX) syringe 40 mg  40 mg Subcutaneous Q24H Ron Flores MD   40 mg at 03/26/17 0906   • famotidine (PEPCID) tablet 20 mg  20 mg Oral Daily Lizandro Almanzar MD   20 mg at 03/27/17 0814   • glucagon (GLUCAGEN) injection 1 mg  1 mg Subcutaneous Q15 Min PRN VANNA Sykes       • heparin flush (porcine) 100 UNIT/ML injection 500 Units  500 Units Intracatheter Q12H Lizandro Almanzar MD   500 Units at 03/27/17 0814   • insulin detemir (LEVEMIR) injection 15 Units  15 Units Subcutaneous Nightly Lizandro Almanzar MD   15 Units  at 03/26/17 2102   • insulin lispro (humaLOG) injection 0-7 Units  0-7 Units Subcutaneous Q6H Lizandro Almanzar MD       • ipratropium-albuterol (DUO-NEB) nebulizer solution 3 mL  3 mL Nebulization Q6H PRN Val Ho MD   3 mL at 03/22/17 1650   • levothyroxine (SYNTHROID, LEVOTHROID) tablet 100 mcg  100 mcg Oral Q AM Lizandro Almanzar MD   100 mcg at 03/27/17 0504   • LORazepam (ATIVAN) injection 1 mg  1 mg Intravenous Q4H PRN Lizandro Almanzar MD       • Magnesium Sulfate 2 gram Bolus, followed by 8 gram infusion (total Mg dose 10 grams)- Mg less than or equal to 1mg/dL  2 g Intravenous PRN VANNA Sykes        Or   • Magnesium Sulfate 6 gram Infusion-Mg 1.1 -1.5 mg/dL  2 g Intravenous PRN VANNA Sykes        Or   • magnesium sulfate 4 gram infusion- Mg 1.6-1.9 mg/dL  4 g Intravenous PRN VANNA Sykes 25 mL/hr at 03/25/17 0223 4 g at 03/25/17 0223   • Morphine 20 MG/ML concentrated solution 5 mg  5 mg Oral Q3H PRN Lizandro Almanzar MD   5 mg at 03/27/17 0503   • ondansetron (ZOFRAN) tablet 4 mg  4 mg Oral Q6H PRN VANNA Santamaria        Or   • ondansetron (ZOFRAN) injection 4 mg  4 mg Intravenous Q6H PRN VANNA Santamaria   4 mg at 03/27/17 1253   • oxyCODONE-acetaminophen (PERCOCET) 5-325 MG per tablet 1 tablet  1 tablet Oral Q4H PRN Lizandro Almanzar MD   1 tablet at 03/26/17 2033   • Pharmacy Consult - MTM   Does not apply Daily Jonathan Kasper RPH       • potassium & sodium phosphates (PHOS-NAK) 280-160-250 MG packet 2 packet  2 packet Oral Q6H PRN VANNA Sykes        Or   • potassium & sodium phosphates (PHOS-NAK) 280-160-250 MG packet 2 packet  2 packet Oral Once PRN VANNA Sykes       • potassium chloride (MICRO-K) CR capsule 40 mEq  40 mEq Oral PRN Lizandro Almanzar MD        Or   • potassium chloride (KLOR-CON) packet 40 mEq  40 mEq Oral PRN Lizandro Almanzar MD   40 mEq at 03/25/17 0548     Or   • potassium chloride 20 mEq in 50 mL IVPB  20 mEq Intravenous Q1H PRN Lizandro Almanzar MD 50 mL/hr at 03/27/17 0306 20 mEq at 03/27/17 0306   • PRO-STAT 1 packet  1 packet Nasogastric Daily Catina JOE Roque, RDN, LD   1 packet at 03/27/17 0816   • sacubitril-valsartan (ENTRESTO) 24-26 MG tablet 1 tablet  1 tablet Oral Q12H Lizandro Almanzar MD   1 tablet at 03/27/17 0504   • sodium chloride 0.9 % flush 1-10 mL  1-10 mL Intravenous PRN Ben Garrison, APRN       • spironolactone (ALDACTONE) tablet 25 mg  25 mg Oral Daily Dustin Donis MD   25 mg at 03/27/17 0814   • vancomycin oral solution 250 mg  250 mg Nasogastric Q6H Anne Parker, VANNA   250 mg at 03/27/17 1116   • venlafaxine (EFFEXOR) tablet 37.5 mg  37.5 mg Oral BID Camden Raymond MD   37.5 mg at 03/27/17 1211        •  acetaminophen  •  acetaminophen **OR** acetaminophen  •  calcium gluconate IVPB **AND** calcium gluconate IVPB  •  dextrose  •  dextrose  •  glucagon (human recombinant)  •  ipratropium-albuterol  •  LORazepam  •  magnesium sulfate **OR** magnesium sulfate **OR** magnesium sulfate  •  Morphine  •  ondansetron **OR** ondansetron  •  oxyCODONE-acetaminophen  •  potassium & sodium phosphates **OR** potassium & sodium phosphates  •  potassium chloride **OR** potassium chloride **OR** potassium chloride  •  sodium chloride  Allergies   Allergen Reactions   • Codeine Nausea And Vomiting   • Lortab [Hydrocodone-Acetaminophen] Nausea And Vomiting     Family History   Problem Relation Age of Onset   • Aneurysm Mother    • Stroke Mother    • No Known Problems Father      Social History     Social History   • Marital status:      Spouse name: N/A   • Number of children: N/A   • Years of education: N/A     Occupational History   • Not on file.     Social History Main Topics   • Smoking status: Former Smoker   • Smokeless tobacco: Never Used   • Alcohol use 0.6 oz/week     1 Glasses of wine per week   • Drug use: No  "  • Sexual activity: Defer     Other Topics Concern   • Not on file     Social History Narrative     Review of Systems - all others reviewed and found negative mentioned in history of present illness      /67  Pulse 76  Temp 97.6 °F (36.4 °C) (Axillary)   Resp 14  Ht 64\" (162.6 cm)  Wt 138 lb 3.2 oz (62.7 kg)  SpO2 95%  BMI 23.72 kg/m2    Intake/Output Summary (Last 24 hours) at 03/27/17 1624  Last data filed at 03/27/17 1100   Gross per 24 hour   Intake           1794.9 ml   Output              750 ml   Net           1044.9 ml     Physical Exam:      General Appearance:    Alert, cooperative, in no acute distress   Head:    Normocephalic, without obvious abnormality, atraumatic   Eyes:            Lids and lashes normal, conjunctivae and sclerae normal, no   icterus, no pallor, corneas clear, PERRLA   Ears:    Ears appear intact with no abnormalities noted   Throat:   No oral lesions, no thrush, oral mucosa moist   Neck:   No adenopathy, supple, trachea midline, no thyromegaly, no     carotid bruit, no JVD   Back:     No kyphosis present, no scoliosis present, no skin lesions,       erythema or scars, no tenderness to percussion or                   palpation,   range of motion normal   Lungs:     Clear to auscultation,respirations regular, even and                   unlabored    Heart:    Regular rhythm and normal rate, normal S1 and S2, no            murmur, no gallop, no rub, no click   Breast Exam:    Deferred   Abdomen:     Normal bowel sounds, no masses, no organomegaly, soft        non-tender, non-distended, no guarding, no rebound                 tenderness   Genitalia:    Deferred   Extremities:   Moves all extremities well, no edema, no cyanosis, no              redness   Pulses:   Pulses palpable and equal bilaterally   Skin:   No bleeding, bruising or rash   Lymph nodes:   No palpable adenopathy   Neurologic:   Cranial nerves 2 - 12 grossly intact, sensation intact, DTR        present and " equal bilaterally         Results from last 7 days  Lab Units 03/27/17  1601 03/27/17  0323   WBC 10*3/mm3  --  9.08   HEMOGLOBIN g/dL 10.3* 9.1*   HEMATOCRIT % 34.4* 29.5*   PLATELETS 10*3/mm3  --  146*       Results from last 7 days  Lab Units 03/27/17  0614 03/27/17  0323   SODIUM mmol/L  --  149*   POTASSIUM mmol/L  --  4.0   CHLORIDE mmol/L  --  111*   TOTAL CO2 mmol/L  --  31.0   BUN mg/dL  --  22   CREATININE mg/dL  --  0.60   CALCIUM mg/dL  --  8.7   BILIRUBIN mg/dL  --  0.5   ALK PHOS U/L  --  375*   ALT (SGPT) U/L  --  269*   AST (SGOT) U/L  --  117*   GLUCOSE mg/dL 129* 94       Results from last 7 days  Lab Units 03/27/17  0614 03/27/17  0323   SODIUM mmol/L  --  149*   POTASSIUM mmol/L  --  4.0   CHLORIDE mmol/L  --  111*   TOTAL CO2 mmol/L  --  31.0   BUN mg/dL  --  22   CREATININE mg/dL  --  0.60   GLUCOSE mg/dL 129* 94   CALCIUM mg/dL  --  8.7     Imaging Results (last 72 hours)     Procedure Component Value Units Date/Time    CT Head Without Contrast [[id number]] Collected:  03/24/17 1640     Updated:  03/25/17 0818    Narrative:       EXAMINATION: CT HEAD WO CONTRAST- 03/24/2017     INDICATION: R13.13-Dysphagia, pharyngeal phase; Z74.09-Other reduced  mobility; I51.81-Takotsubo syndrome         TECHNIQUE:  CT scan of the head was performed at 5 mm intervals. No  intravenous contrast was utilized.     COMPARISON: 12/02/2016      FINDINGS: There is no intra-axial mass.  There is no hemorrhage. There  is no midline shift or extra-axial fluid collection. There is an old  right occipital infarct. There are no other focal abnormalities and  there has been no change since 12/02/2016.        Impression:       Old right occipital infarct. There are no acute findings.     D:  03/24/2017  E:  03/24/2017     This report was finalized on 3/25/2017 8:16 AM by Dr. Rambo Lopez MD.       XR Abdomen KUB [[id number]] Collected:  03/24/17 1645     Updated:  03/27/17 0914    Narrative:       EXAMINATION: XR ABDOMEN KUB-  03/24/2017     INDICATION: Keofeed placement; R13.13-Dysphagia, pharyngeal phase;  Z74.09-Other reduced mobility; I51.81-Takotsubo syndrome      COMPARISON: NONE     FINDINGS: Imaging of the abdomen reveals a feeding tube identified tip  in the fourth portion of the duodenum. There is spinal hardware in place  and intact. Degenerative changes seen within the spine.  The bowel gas  pattern is unremarkable.           Impression:       Feeding tube is identified tip in the fourth portion of the  duodenum.     D:  03/24/2017  E:  03/24/2017     This report was finalized on 3/27/2017 9:12 AM by Dr. Kasey Barakat MD.           Impression: Status post cardiac arrest  Muscle skeletal pain, chest  Confusion  Goals of care  Plan: We'll evaluate on the patient's son to arrive to try to have discussions with him about overall goals of care.  Patient's CODE STATUS is noted to be DNR which I agree with.  Reportedly decided leaning more towards a comfort focus plan of care and does not want to pursue any other types of procedures.  I will also need to discuss with the patient's son about feeding options as the patient has failed her swallowing studies but reportedly does not want any type of feeding tube.        Aldo Mauro DO  03/27/17  4:24 PM               Electronically signed by Aldo Mauro DO at 3/27/2017  4:26 PM           Nutrition Notes (most recent note)      Jolly Pepe RD at 3/31/2017 10:07 AM  Version 1 of 1         Adult Nutrition  Assessment/PES    Patient Name:  Neida Burk  YOB: 1933  MRN: [id number]  Admit Date:  3/20/2017    Assessment Date:  3/31/2017        Reason for Assessment       03/31/17 1006    Reason for Assessment    Reason For Assessment/Visit TF/PN;follow up protocol    Time Spent (min) 10    Diagnosis --   per notes this admission                        Nutrition Prescription Ordered       03/31/17 1006    Nutrition Prescription PO    Current PO Diet  Dysphagia    Dysphagia Level 3  Pureed with some mashed    Fluid Consistency Thin            Evaluation of Received Nutrient/Fluid Intake       03/31/17 1006    PO Evaluation    Number of Meals 4    % PO Intake 25    EN Evaluation    TF Changes Discontinued              Problem/Interventions:        Problem 1       03/31/17 1007    Nutrition Diagnoses Problem 1    Resolved? Yes            Problem 2       03/31/17 1007    Nutrition Diagnoses Problem 2    Resolved? Yes            Problem 3       03/31/17 1007    Nutrition Diagnoses Problem 3    Problem 3 Nutrition Appropriate for Condition at this Time    Etiology (related to) Goals of Care    Signs/Symptoms (evidenced by) --   comfort measures                Intervention Goal       03/31/17 1007    Intervention Goal    General Palliative Care            Nutrition Intervention       03/31/17 1007    Nutrition Intervention    RD/Tech Action Follow Tx progress              Education/Evaluation       03/31/17 1007    Monitor/Evaluation    Monitor Per protocol            Electronically signed by:  Jolly Pepe RD  03/31/17 10:07 AM     Electronically signed by Jolly Pepe RD at 3/31/2017 10:08 AM           Physical Therapy Notes (most recent note)      Radha Hager, PT at 3/28/2017  9:36 AM  Version 1 of 1         ICU ROUNDS: Palliative to meet with family today. Pt currently resting comfortably; increased agitation throughout night. Will cont PT POC as clinically warranted.      Electronically signed by Radha Hager, PT at 3/28/2017  9:37 AM        Occupational Therapy Notes (most recent note)     No notes of this type exist for this encounter.           Speech Language Pathology Notes (most recent note)      Roya Weaver MS CCC-SLP at 3/28/2017  3:27 PM  Version 1 of 1            03/28/17 1524   SLP Deferred Reason   SLP Deferred Reason Routine  (Note pt has moved to full comfort diet.  Adjusted diet to reflect most comfortable diet, further adjust as  per pt's wishes.  All family education well covered by Dr Mauro. No further SLP needs at this time - reconsult if/as needed.  Thanks)        Electronically signed by Roya Weaver MS CCC-SLP at 3/28/2017  3:27 PM             Discharge Summary      Casie M Mayne, PA at 4/5/2017  8:50 AM              [facility] Medicine Services  DISCHARGE SUMMARY       Date of Admission: 3/20/2017  Date of Discharge:  4/5/2017  Primary Care Physician: Sergio Garcia MD    Discharge Diagnoses:  Active Hospital Problems (** Indicates Principal Problem)    Diagnosis Date Noted   • **S/P Cardiac arrest [I46.9] 03/21/2017   • Hypoglycemia [E16.2] 04/02/2017   • Clostridium difficile colitis [A04.7] 03/26/2017   • Right Occipital Infarct (Old) [I63.9] 03/25/2017   • Encephalopathy [G93.40] 03/24/2017   • Elevated LFTs (R/O Shock Liver) [R79.89] 03/22/2017   • Cardiomyopathy (R/O Takotsubo EF 20%) [I51.81] 03/21/2017   • Chronic a-fib [I48.2] 01/10/2017      Resolved Hospital Problems    Diagnosis Date Noted Date Resolved   • Ventricular tachyarrhythmia [I47.2] 03/21/2017 04/05/2017   • Acute pulmonary edema [J81.0] 03/21/2017 04/05/2017   • Shock (R/O Cardiogenic, septic) [R57.9] 03/21/2017 04/05/2017   • R/O Sepsis [A41.9] 01/10/2017 04/05/2017   • Acute respiratory failure with hypoxia [J96.01] 01/10/2017 04/05/2017   • Elevated liver enzymes due to shock liver [R74.8] 12/02/2016 01/10/2017   • Gastrointestinal hemorrhage with melena [K92.1] 12/02/2016 04/05/2017       Presenting Problem/History of Present Illness  Cardiac arrest [I46.9]       History of Present Illness on Day of Discharge:   Sitting  Up in bed, alert, pleasantly confused. Asking for water, provided. Occasional cough, nonproductive, on comfort diet. Not hungry this a.m. No family present at time of visit. Limited ability to obtain ROS 2nd to confusion.     Hospital Course  Patient is a 83 y.o. female with recent hospitalization 1/2017  "for aspiration pna and yeast UTI w/transient hypotension, chronic afib w/ hx of UGIB with discontinuation of warfarin tx.She presented to OSH after fall, hypotension, and cardiac arrest, was resuscitated but requiring significant pressor support, was following commands/arouseable after event. She received volume resuscitation, was intubated and transferred to [facility] 3/21/17 for further care, admitted to ICU. Pt was profoundly acidotic from hypoperfusion/shock. She was placed on broad spectrum abx coverage, asa, plavix,. Cardiology was consulted reviewed labs/testing, felt ECG and troponin profile suggestive if not diagnostic of stress \"Takotsubo\" cardiomyopathy. LHC was deferred 2nd to age, comorbidities, and ongoing concern for sepsis. She received diuresis for pulm edema Patient was able to be extubated and tolerating supplemental O2 via NC. She was able to be weaned off of pressor support. . Low dose carvedilol and entresto per Cardiology. Patient was able to be transferred out of ICU to an acute care bed, followed by hospitalist service.    Cardiology recommending comfort measures given dx, confusion, co morbidities.     Severe shock, septic vs cardiogenic resolved. Sepsis of uknown etiology, was dx with C diff colitis 3/25/17, now resolved. Pt stable on po vancomycin and probiotics. Pt will complete days o tx oral   vancomycin 4/8.     Pt found to have elevated TSH with normal T4, likely skewed given recent critical illness, continues home dose of levothyroxine, to followup labs in 4-6wks per PCP.     Palliative care have been following during course of stay, discussing goals of care with pt and family. Hospice services consulted. Patient is DNR.     SLP performed MBS 2nd to concerns for aspiration, found to have severe pharyngeal dysphagia. Family did not want FT. Swallow was reassessed during course of stay. FEES 3/27 with no improvement, SLP rec NPO with alt route feeding, if comfort diet desired: Dysphagia " level 3 with thins and meds crushed in puree. Family/patient have chosen comfort diet.     Patient with episodic hypoglycemia on FSBS, but asymptomatic and at baseline confusion, alert and interacting with staff, ? Accuracy, unable to get venous stick 2nd to difficult stick. Given comfort goals, will dc FSBS.     Per palliative care/CM , family have requested to go to [place] with Hospice suppport. Mirtha Ott with Hospice having family meeting 4/5. CM has helped arrange ambulance to transport pt.     Patient currently afebrile, satting in low-mid 90's on 3L NC O2, taking about 25% of meals per documentation, LBM 4/4. Antihypertensives held at times during stay 2nd to bp, will need to continue holding parameters at time of transfer if desire to continue this regimen, defer to Hospice/family/primary provider at facility. Patient in medically stable, appropriate for discharge to facility with hospice.     Procedures Performed  TTE 3/21/17       Consults:   Consults     Date and Time Order Name Status Description    3/27/2017 1204 Inpatient Consult to Palliative Care MD Completed     3/21/2017 0132 Inpatient Consult to Cardiology Completed           Pertinent Test Results:    Glucose, Random [[id number]] (Normal) Collected: 04/05/17 0810       Lab Status: Final result Specimen: Blood Updated: 04/05/17 0924        Glucose 80 mg/dL        Glucose, Random [[id number]] (Abnormal) Collected: 04/03/17 1702       Lab Status: Final result Specimen: Blood Updated: 04/03/17 1732        Glucose 179 (H) mg/dL        Hemoglobin & Hematocrit, Blood [[id number]] (Abnormal) Collected: 04/01/17 0517       Lab Status: Final result Specimen: Blood Updated: 04/01/17 0552        Hemoglobin 9.2 (L) g/dL         Hematocrit 31.3 (L) %        Hemoglobin & Hematocrit, Blood [[id number]] (Abnormal) Collected: 03/31/17 0233       Lab Status: Final result Specimen: Blood Updated: 03/31/17 0326        Hemoglobin 8.8 (L) g/dL         Hematocrit 29.4 (L) %         Hemoglobin & Hematocrit, Blood [[id number]] (Abnormal) Collected: 03/30/17 1345       Lab Status: Final result Specimen: Blood Updated: 03/30/17 1408        Hemoglobin 8.4 (L) g/dL         Hematocrit 27.7 (L) %        CBC (No Diff) [[id number]] (Abnormal) Collected: 03/30/17 0600       Lab Status: Final result Specimen: Blood Updated: 03/30/17 0615        WBC 12.73 (H) 10*3/mm3         RBC 3.27 (L) 10*6/mm3         Hemoglobin 7.7 (L) g/dL         Hematocrit 25.0 (L) %         MCV 76.5 (L) fL         MCH 23.5 (L) pg         MCHC 30.8 (L) g/dL         RDW 17.6 (H) %         RDW-SD 49.1 fl         MPV 10.6 fL         Platelets 255 10*3/mm3        Comprehensive Metabolic Panel [[id number]] (Abnormal) Collected: 03/28/17 0352       Lab Status: Final result Specimen: Blood Updated: 03/28/17 0424        Glucose 53 (L) mg/dL         BUN 21 mg/dL         Creatinine 0.60 mg/dL         Sodium 144 mmol/L         Potassium 4.1 mmol/L         Chloride 111 (H) mmol/L         CO2 31.0 mmol/L         Calcium 9.7 mg/dL         Total Protein 6.3 g/dL         Albumin 2.80 (L) g/dL         ALT (SGPT) 244 (H) U/L         AST (SGOT) 105 (H) U/L         Alkaline Phosphatase 405 (H) U/L         Total Bilirubin 0.5 mg/dL         eGFR Non African Amer 95 mL/min/1.73         Globulin 3.5 gm/dL         A/G Ratio 0.8 (L) g/dL         BUN/Creatinine Ratio 35.0 (H)        Anion Gap 2.0 (L) mmol/L        Narrative:         Imaging Results (most recent)     Procedure Component Value Units Date/Time    XR Abdomen KUB [[id number]] Collected:  03/21/17 1215     Updated:  03/21/17 1218    Narrative:       EXAMINATION: XR ABDOMEN, KUB-03/21/2017:      INDICATION: Distention.      COMPARISON: NONE.     FINDINGS: Fusion hardware is present in the lower lumbar region and  sacral wings. Total hip arthroplasty is present on the right. Gas is  present in loops of large bowel. A large amount of stool is present in  the sigmoid colon and rectum. The osseous  structures of the abdomen are  normal.           Impression:       Large quantity of stool in the sigmoid colon and rectum.     D:  03/21/2017  E:  03/21/2017     This report was finalized on 3/21/2017 12:16 PM by Dr. Pedro Garcia MD.       XR Chest 1 View [[id number]] Collected:  03/21/17 1130     Updated:  03/21/17 2301    Narrative:       EXAMINATION: XR CHEST 1 VW- 03/21/2017     INDICATION: post cardiac arrest      COMPARISON: 01/10/2017 2 view chest     FINDINGS: ET tube is now seen a couple of centimeters above the danis.  NG tube is seen in the stomach. The heart is borderline enlarged. There  is diffuse and extensive reticular and groundglass interstitial disease,  increased from the prior study. No consolidated lung effusion or  pneumothorax is seen.           Impression:       ET tube and NG tube in good position. Moderately extensive  interstitial disease, presumably edema.     D:  03/21/2017  E:  03/21/2017     This report was finalized on 3/21/2017 10:59 PM by DR. James Domingo MD.       CT Abdomen Pelvis Without Contrast [[id number]] Collected:  03/22/17 0801     Updated:  03/22/17 1104    Narrative:       EXAMINATION: CT ABDOMEN AND PELVIS WO CONTRAST-      INDICATION: Left lower quadrant pain, questionable hernia.     TECHNIQUE: CT scan of the abdomen and pelvis was performed without  contrast.     The radiation dose reduction device was turned on for each scan per the  ALARA (As Low as Reasonably Achievable) protocol.     COMPARISON: 12/02/2016.     FINDINGS: There is ascites.  The liver and spleen are normal. There is  no adrenal or pancreatic mass. There is no aneurysm or retroperitoneal  lymphadenopathy. There are sigmoid diverticuli without diverticulitis.  There is no inguinal lymphadenopathy.       Impression:       1. Ascites.  2. Sigmoid diverticulosis without diverticulitis.     D:  03/21/2017  E:  03/22/2017     This report was finalized on 3/22/2017 11:02 AM by Dr. Rambo Lopez MD.       CT  Chest Without Contrast [[id number]] Collected:  03/22/17 0841     Updated:  03/22/17 1104    Narrative:       EXAMINATION: CT CHEST WO CONTRAST-      INDICATION: Rule out pneumonia, rib/sternal fracture.     TECHNIQUE: CT scan of the chest was performed at 5 mm intervals. No  intravenous contrast was utilized.     The radiation dose reduction device was turned on for each scan per the  ALARA (As Low as Reasonably Achievable) protocol.     COMPARISON: 01/11/2017.     FINDINGS: There is no axillary lymphadenopathy. There is no mediastinal  or hilar lymphadenopathy.  An endotracheal tube is well positioned.  There are small bibasilar infiltrates and effusions. There are fractures  of the left seventh, eighth and ninth ribs some of which show early  callus formation.  There is no pneumothorax.       Impression:       1. There are bibasilar pulmonary infiltrates and bilateral pleural  effusions.  2. Multiple left rib fractures some of which show early callus  formation. There is no pneumothorax.  3. The endotracheal tube is well positioned.     D:  03/21/2017  E:  03/22/2017        This report was finalized on 3/22/2017 11:02 AM by Dr. Rambo Lopez MD.       XR Chest 1 View [[id number]] Collected:  03/22/17 1109     Updated:  03/23/17 1502    Narrative:       EXAMINATION: XR CHEST 1 VW- 03/22/2017     INDICATION: Respiratory Distress      COMPARISON: 03/21/2017     FINDINGS: Portable chest reveals lines and tubes to be in satisfactory  position. The heart is borderline enlarged. Some improvement seen in  aeration of the midlungs. Mild increased markings at lung bases.  Prominence of the pulmonary vascularity. Small right pleural effusion.            Impression:       Improvement seen in the pulmonary vascularity. Lines and  tubes remain in satisfactory position.     D:  03/22/2017  E:  03/22/2017     This report was finalized on 3/23/2017 3:00 PM by Dr. Kasey Barakat MD.       CT Head Without Contrast [[id number]]  "Collected:  03/24/17 1640     Updated:  03/25/17 0818    Narrative:       EXAMINATION: CT HEAD WO CONTRAST- 03/24/2017     INDICATION: R13.13-Dysphagia, pharyngeal phase; Z74.09-Other reduced  mobility; I51.81-Takotsubo syndrome         TECHNIQUE:  CT scan of the head was performed at 5 mm intervals. No  intravenous contrast was utilized.     COMPARISON: 12/02/2016      FINDINGS: There is no intra-axial mass.  There is no hemorrhage. There  is no midline shift or extra-axial fluid collection. There is an old  right occipital infarct. There are no other focal abnormalities and  there has been no change since 12/02/2016.        Impression:       Old right occipital infarct. There are no acute findings.     D:  03/24/2017  E:  03/24/2017     This report was finalized on 3/25/2017 8:16 AM by Dr. Rambo Lopez MD.             TTE3/21/17   · Left ventricular function is severely decreased. Estimated EF = 20%.  · Severely reduced right ventricular systolic function noted.  · Calculated right ventricular systolic pressure from tricuspid regurgitation is 40 mmHg.  · Mild to moderate tricuspid valve regurgitation is present.  · Peak velocity of the flow distal to the aortic valve is 87.6 cm/s.  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  · Right ventricular cavity is borderline dilated.  · There is no evidence of pericardial effusion.  · Mild pulmonary hypertension is present.  · The aortic valve exhibits sclerosis.  Condition on Discharge:  Guarded, Poor prognosis    Physical Exam on Discharge:/62 (BP Location: Left arm, Patient Position: Lying)  Pulse 80  Temp 97.8 °F (36.6 °C) (Axillary)   Resp 20  Ht 64\" (162.6 cm)  Wt 132 lb 3.2 oz (60 kg)  SpO2 94%  BMI 22.69 kg/m2  Physical Exam  Temp:  [97.8 °F (36.6 °C)] 97.8 °F (36.6 °C)  Heart Rate:  [70-80] 80  BP: ()/(44-62) 104/62  Constitutional: no acute distress, awake, alert  Respiratory: slightly diminished throughout, no " wheezes/rales, nonlabored respirations   Cardiovascular: RRR, no murmurs, rubs, or gallops, palpable pedal pulses bilaterally  Gastrointestinal: Positive bowel sounds, soft, nontender, nondistended  Musculoskeletal: No bilateral ankle edema  Psychiatric:confused, pleasant and cooperative  Neurologic: oriented to person only, follows some commands     Discharge Disposition  Skilled Nursing Facility (DC - External)  Discharge Medications   [place]   Home Medication Instructions YAEL:[id number]    Printed on:04/05/17 1145   Medication Information                      aspirin 81 MG chewable tablet  Chew 81 mg Daily.             carvedilol (COREG) 6.25 MG tablet  Take 1 tablet by mouth Every 12 (Twelve) Hours. Hold for SBP<120             cyclobenzaprine (FLEXERIL) 5 MG tablet  Take 1 tablet by mouth 2 (Two) Times a Day As Needed for Muscle Spasms.             furosemide (LASIX) 40 MG tablet  Take 40 mg by mouth Daily.             ipratropium-albuterol (DUO-NEB) 0.5-2.5 mg/mL nebulizer  Take 3 mL by nebulization Every 6 (Six) Hours As Needed for Shortness of Air.             levothyroxine (SYNTHROID, LEVOTHROID) 100 MCG tablet  Take 1 tablet by mouth Daily.             Morphine 20 MG/ML concentrated solution  Take 0.5 mL by mouth Every 3 (Three) Hours As Needed for Severe Pain (7-10) for up to 3 days.             ondansetron (ZOFRAN) 4 MG tablet  Take 1 tablet by mouth Every 6 (Six) Hours As Needed for Nausea or Vomiting.             oxyCODONE-acetaminophen (PERCOCET) 5-325 MG per tablet  Take 1 tablet by mouth Every 4 (Four) Hours As Needed for Moderate Pain (4-6) for up to 3 days.             pantoprazole (PROTONIX) 40 MG EC tablet  Take 40 mg by mouth Daily.             polyethylene glycol (MIRALAX) packet  Take 17 g by mouth Every Other Day.             saccharomyces boulardii (FLORASTOR) 250 MG capsule  Take 1 capsule by mouth 2 (Two) Times a Day.             sacubitril-valsartan (ENTRESTO) 24-26 MG  tablet  Take 1 tablet by mouth Every 12 (Twelve) Hours. Hold for sbp <120             spironolactone (ALDACTONE) 25 MG tablet  Take 1 tablet by mouth Daily.             vancomycin 50 MG/ML solution oral solution  5 mL by Nasogastric route Every 6 (Six) Hours for 3 days. Indications: Clostridium Difficile Infection             venlafaxine (EFFEXOR) 37.5 MG tablet  Take 1 tablet by mouth Every 12 (Twelve) Hours.                 Discharge Diet:   Diet Instructions     Diet: Dysphagia; Thin Liquids, No Restrictions; Pureed With Some Mashed       Discharge Diet:  Dysphagia   Fluid Consistency:  Thin Liquids, No Restrictions   Pureed Options:  Pureed With Some Mashed   Comfort diet, meds crushed in applesauce/pudding                 Discharge Care Plan / Instructions:    Activity at Discharge:   Activity Instructions     Activity as Tolerated                     Follow-up Appointments  No future appointments.  Additional Instructions for the Follow-ups that You Need to Schedule     Discharge Follow-up with PCP    As directed    Follow Up Details:  to be followed up by provider at facility within 24hr of admit       Discharge Follow-up with Specialty    As directed    Specialty:  Hospice   Follow Up Details:  Patient to followup with Hospice upon transfer to [facility]                 Test Results Pending at Discharge       Casie M Mayne, PA 04/05/17 11:41 AM    Time: Discharge 45 min    Please note that portions of this note may have been completed with a voice recognition program. Efforts were made to edit the dictations, but occasionally words are mistranscribed.       Electronically signed by Casie M Mayne, PA at 4/5/2017 11:42 AM

## 2017-04-05 NOTE — PROGRESS NOTES
Continued Stay Note  [facility]     Patient Name: Neida Burk  MRN: [id number]  Today's Date: 4/5/2017    Admit Date: 3/20/2017          Discharge Plan       04/05/17 1117    Case Management/Social Work Plan    Plan Placement    Patient/Family In Agreement With Plan yes    Additional Comments Discussion over telephone with patient's son, Hu this am. He has spoken with Mirtha Ott RN with hospice and is agreeable for hospice services at the [facility]. He has toured facility on Monday. She will be self pay.      I answered his questions about care giving at the nursing facility and how it is a subacute level of care. He had had questioned if they can do what is done at the hospital. He also questioned if someone would be folowing through with pain management issues and assured him this is part of the care giving with the facility as well as with hospice support on board. Hu is agreeable for transport today at 15:30.    I also spoke with patient and she was pleasant and agreeable to go to the [facility] [address] this afternoon. I have stayed in touch with Maryan Lopez from the [facility] and they are on board with patient coming today.     Hu plans to come by and speak with his mother. He will complete the ambulance DNR form.     Nursing will need to call report to [phone].                Discharge Codes     None        Expected Discharge Date and Time     Expected Discharge Date Expected Discharge Time    Apr 5, 2017             Maryan Phelps RN

## 2017-04-05 NOTE — DISCHARGE SUMMARY
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] 03/25/2017   • Encephalopathy [G93.40] 03/24/2017   • Elevated LFTs (R/O Shock Liver) [R79.89] 03/22/2017   • Cardiomyopathy (R/O Takotsubo EF 20%) [I51.81] 03/21/2017   • Chronic a-fib [I48.2] 01/10/2017      Resolved Hospital Problems    Diagnosis Date Noted Date Resolved   • Ventricular tachyarrhythmia [I47.2] 03/21/2017 04/05/2017   • Acute pulmonary edema [J81.0] 03/21/2017 04/05/2017   • Shock (R/O Cardiogenic, septic) [R57.9] 03/21/2017 04/05/2017   • R/O Sepsis [A41.9] 01/10/2017 04/05/2017   • Acute respiratory failure with hypoxia [J96.01] 01/10/2017 04/05/2017   • Elevated liver enzymes due to shock liver [R74.8] 12/02/2016 01/10/2017   • Gastrointestinal hemorrhage with melena [K92.1] 12/02/2016 04/05/2017       Presenting Problem/History of Present Illness  Cardiac arrest [I46.9]       History of Present Illness on Day of Discharge:   Sitting  Up in bed, alert, pleasantly confused. Asking for water, provided. Occasional cough, nonproductive, on comfort diet. Not hungry this a.m. No family present at time of visit. Limited ability to obtain ROS 2nd to confusion.     Hospital Course  Patient is a 83 y.o. female with recent hospitalization 1/2017 for aspiration pna and yeast UTI w/transient hypotension, chronic afib w/ hx of UGIB with discontinuation of warfarin tx.She presented to OSH after fall, hypotension, and cardiac arrest, was resuscitated but requiring significant pressor support, was following commands/arouseable after event. She received  "volume resuscitation, was intubated and transferred to [facility] 3/21/17 for further care, admitted to ICU. Pt was profoundly acidotic from hypoperfusion/shock. She was placed on broad spectrum abx coverage, asa, plavix,. Cardiology was consulted reviewed labs/testing, felt ECG and troponin profile suggestive if not diagnostic of stress \"Takotsubo\" cardiomyopathy. LHC was deferred 2nd to age, comorbidities, and ongoing concern for sepsis. She received diuresis for pulm edema Patient was able to be extubated and tolerating supplemental O2 via NC. She was able to be weaned off of pressor support. . Low dose carvedilol and entresto per Cardiology. Patient was able to be transferred out of ICU to an acute care bed, followed by hospitalist service.    Cardiology recommending comfort measures given dx, confusion, co morbidities.     Severe shock, septic vs cardiogenic resolved. Sepsis of uknown etiology, was dx with C diff colitis 3/25/17, now resolved. Pt stable on po vancomycin and probiotics. Pt will complete days o tx oral   vancomycin 4/8.     Pt found to have elevated TSH with normal T4, likely skewed given recent critical illness, continues home dose of levothyroxine, to followup labs in 4-6wks per PCP.     Palliative care have been following during course of stay, discussing goals of care with pt and family. Hospice services consulted. Patient is DNR.     SLP performed MBS 2nd to concerns for aspiration, found to have severe pharyngeal dysphagia. Family did not want FT. Swallow was reassessed during course of stay. FEES 3/27 with no improvement, SLP rec NPO with alt route feeding, if comfort diet desired: Dysphagia level 3 with thins and meds crushed in puree. Family/patient have chosen comfort diet.     Patient with episodic hypoglycemia on FSBS, but asymptomatic and at baseline confusion, alert and interacting with staff, ? Accuracy, unable to get venous stick 2nd to difficult stick. Given comfort goals, will dc FSBS. "     Per palliative care/CM , family have requested to go to [facility] with Hospice suppport. Mirtha Ott with Hospice having family meeting 4/5. CM has helped arrange ambulance to transport pt.     Patient currently afebrile, satting in low-mid 90's on 3L NC O2, taking about 25% of meals per documentation, LBM 4/4. Antihypertensives held at times during stay 2nd to bp, will need to continue holding parameters at time of transfer if desire to continue this regimen, defer to Hospice/family/primary provider at facility. Patient in medically stable, appropriate for discharge to facility with hospice.     Procedures Performed  TTE 3/21/17       Consults:   Consults     Date and Time Order Name Status Description    3/27/2017 1204 Inpatient Consult to Palliative Care MD Completed     3/21/2017 0132 Inpatient Consult to Cardiology Completed           Pertinent Test Results:    Glucose, Random [[id number]] (Normal) Collected: 04/05/17 0810       Lab Status: Final result Specimen: Blood Updated: 04/05/17 0924        Glucose 80 mg/dL        Glucose, Random [[id number]] (Abnormal) Collected: 04/03/17 1702       Lab Status: Final result Specimen: Blood Updated: 04/03/17 1732        Glucose 179 (H) mg/dL        Hemoglobin & Hematocrit, Blood [[id number]] (Abnormal) Collected: 04/01/17 0517       Lab Status: Final result Specimen: Blood Updated: 04/01/17 0552        Hemoglobin 9.2 (L) g/dL         Hematocrit 31.3 (L) %        Hemoglobin & Hematocrit, Blood [[id number]] (Abnormal) Collected: 03/31/17 0233       Lab Status: Final result Specimen: Blood Updated: 03/31/17 0326        Hemoglobin 8.8 (L) g/dL         Hematocrit 29.4 (L) %        Hemoglobin & Hematocrit, Blood [[id number]] (Abnormal) Collected: 03/30/17 1345       Lab Status: Final result Specimen: Blood Updated: 03/30/17 1408        Hemoglobin 8.4 (L) g/dL         Hematocrit 27.7 (L) %        CBC (No Diff) [[id number]] (Abnormal) Collected: 03/30/17 0600       Lab Status: Final  result Specimen: Blood Updated: 03/30/17 0615        WBC 12.73 (H) 10*3/mm3         RBC 3.27 (L) 10*6/mm3         Hemoglobin 7.7 (L) g/dL         Hematocrit 25.0 (L) %         MCV 76.5 (L) fL         MCH 23.5 (L) pg         MCHC 30.8 (L) g/dL         RDW 17.6 (H) %         RDW-SD 49.1 fl         MPV 10.6 fL         Platelets 255 10*3/mm3        Comprehensive Metabolic Panel [[id number]] (Abnormal) Collected: 03/28/17 0352       Lab Status: Final result Specimen: Blood Updated: 03/28/17 0424        Glucose 53 (L) mg/dL         BUN 21 mg/dL         Creatinine 0.60 mg/dL         Sodium 144 mmol/L         Potassium 4.1 mmol/L         Chloride 111 (H) mmol/L         CO2 31.0 mmol/L         Calcium 9.7 mg/dL         Total Protein 6.3 g/dL         Albumin 2.80 (L) g/dL         ALT (SGPT) 244 (H) U/L         AST (SGOT) 105 (H) U/L         Alkaline Phosphatase 405 (H) U/L         Total Bilirubin 0.5 mg/dL         eGFR Non African Amer 95 mL/min/1.73         Globulin 3.5 gm/dL         A/G Ratio 0.8 (L) g/dL         BUN/Creatinine Ratio 35.0 (H)        Anion Gap 2.0 (L) mmol/L        Narrative:         Imaging Results (most recent)     Procedure Component Value Units Date/Time    XR Abdomen KUB [[id number]] Collected:  03/21/17 1215     Updated:  03/21/17 1218    Narrative:       EXAMINATION: XR ABDOMEN, KUB-03/21/2017:      INDICATION: Distention.      COMPARISON: NONE.     FINDINGS: Fusion hardware is present in the lower lumbar region and  sacral wings. Total hip arthroplasty is present on the right. Gas is  present in loops of large bowel. A large amount of stool is present in  the sigmoid colon and rectum. The osseous structures of the abdomen are  normal.           Impression:       Large quantity of stool in the sigmoid colon and rectum.     D:  03/21/2017  E:  03/21/2017     This report was finalized on 3/21/2017 12:16 PM by Dr. Pedro Garcia MD.       XR Chest 1 View [[id number]] Collected:  03/21/17 1130     Updated:   03/21/17 2301    Narrative:       EXAMINATION: XR CHEST 1 VW- 03/21/2017     INDICATION: post cardiac arrest      COMPARISON: 01/10/2017 2 view chest     FINDINGS: ET tube is now seen a couple of centimeters above the danis.  NG tube is seen in the stomach. The heart is borderline enlarged. There  is diffuse and extensive reticular and groundglass interstitial disease,  increased from the prior study. No consolidated lung effusion or  pneumothorax is seen.           Impression:       ET tube and NG tube in good position. Moderately extensive  interstitial disease, presumably edema.     D:  03/21/2017  E:  03/21/2017     This report was finalized on 3/21/2017 10:59 PM by DR. James Domingo MD.       CT Abdomen Pelvis Without Contrast [[id number]] Collected:  03/22/17 0801     Updated:  03/22/17 1104    Narrative:       EXAMINATION: CT ABDOMEN AND PELVIS WO CONTRAST-      INDICATION: Left lower quadrant pain, questionable hernia.     TECHNIQUE: CT scan of the abdomen and pelvis was performed without  contrast.     The radiation dose reduction device was turned on for each scan per the  ALARA (As Low as Reasonably Achievable) protocol.     COMPARISON: 12/02/2016.     FINDINGS: There is ascites.  The liver and spleen are normal. There is  no adrenal or pancreatic mass. There is no aneurysm or retroperitoneal  lymphadenopathy. There are sigmoid diverticuli without diverticulitis.  There is no inguinal lymphadenopathy.       Impression:       1. Ascites.  2. Sigmoid diverticulosis without diverticulitis.     D:  03/21/2017  E:  03/22/2017     This report was finalized on 3/22/2017 11:02 AM by Dr. Rambo Lopez MD.       CT Chest Without Contrast [[id number]] Collected:  03/22/17 0841     Updated:  03/22/17 1104    Narrative:       EXAMINATION: CT CHEST WO CONTRAST-      INDICATION: Rule out pneumonia, rib/sternal fracture.     TECHNIQUE: CT scan of the chest was performed at 5 mm intervals. No  intravenous contrast was  utilized.     The radiation dose reduction device was turned on for each scan per the  ALARA (As Low as Reasonably Achievable) protocol.     COMPARISON: 01/11/2017.     FINDINGS: There is no axillary lymphadenopathy. There is no mediastinal  or hilar lymphadenopathy.  An endotracheal tube is well positioned.  There are small bibasilar infiltrates and effusions. There are fractures  of the left seventh, eighth and ninth ribs some of which show early  callus formation.  There is no pneumothorax.       Impression:       1. There are bibasilar pulmonary infiltrates and bilateral pleural  effusions.  2. Multiple left rib fractures some of which show early callus  formation. There is no pneumothorax.  3. The endotracheal tube is well positioned.     D:  03/21/2017  E:  03/22/2017        This report was finalized on 3/22/2017 11:02 AM by Dr. Rambo Lopez MD.       XR Chest 1 View [[id number]] Collected:  03/22/17 1109     Updated:  03/23/17 1502    Narrative:       EXAMINATION: XR CHEST 1 VW- 03/22/2017     INDICATION: Respiratory Distress      COMPARISON: 03/21/2017     FINDINGS: Portable chest reveals lines and tubes to be in satisfactory  position. The heart is borderline enlarged. Some improvement seen in  aeration of the midlungs. Mild increased markings at lung bases.  Prominence of the pulmonary vascularity. Small right pleural effusion.            Impression:       Improvement seen in the pulmonary vascularity. Lines and  tubes remain in satisfactory position.     D:  03/22/2017  E:  03/22/2017     This report was finalized on 3/23/2017 3:00 PM by Dr. Kasey Barakat MD.       CT Head Without Contrast [[id number]] Collected:  03/24/17 1640     Updated:  03/25/17 0818    Narrative:       EXAMINATION: CT HEAD WO CONTRAST- 03/24/2017     INDICATION: R13.13-Dysphagia, pharyngeal phase; Z74.09-Other reduced  mobility; I51.81-Takotsubo syndrome         TECHNIQUE:  CT scan of the head was performed at 5 mm intervals.  "No  intravenous contrast was utilized.     COMPARISON: 12/02/2016      FINDINGS: There is no intra-axial mass.  There is no hemorrhage. There  is no midline shift or extra-axial fluid collection. There is an old  right occipital infarct. There are no other focal abnormalities and  there has been no change since 12/02/2016.        Impression:       Old right occipital infarct. There are no acute findings.     D:  03/24/2017  E:  03/24/2017     This report was finalized on 3/25/2017 8:16 AM by Dr. Rambo Lopez MD.             TTE3/21/17   · Left ventricular function is severely decreased. Estimated EF = 20%.  · Severely reduced right ventricular systolic function noted.  · Calculated right ventricular systolic pressure from tricuspid regurgitation is 40 mmHg.  · Mild to moderate tricuspid valve regurgitation is present.  · Peak velocity of the flow distal to the aortic valve is 87.6 cm/s.  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  · Right ventricular cavity is borderline dilated.  · There is no evidence of pericardial effusion.  · Mild pulmonary hypertension is present.  · The aortic valve exhibits sclerosis.  Condition on Discharge:  Guarded, Poor prognosis    Physical Exam on Discharge:/62 (BP Location: Left arm, Patient Position: Lying)  Pulse 80  Temp 97.8 °F (36.6 °C) (Axillary)   Resp 20  Ht 64\" (162.6 cm)  Wt 132 lb 3.2 oz (60 kg)  SpO2 94%  BMI 22.69 kg/m2  Physical Exam  Temp:  [97.8 °F (36.6 °C)] 97.8 °F (36.6 °C)  Heart Rate:  [70-80] 80  BP: ()/(44-62) 104/62  Constitutional: no acute distress, awake, alert  Respiratory: slightly diminished throughout, no wheezes/rales, nonlabored respirations   Cardiovascular: RRR, no murmurs, rubs, or gallops, palpable pedal pulses bilaterally  Gastrointestinal: Positive bowel sounds, soft, nontender, nondistended  Musculoskeletal: No bilateral ankle edema  Psychiatric:confused, pleasant and cooperative  Neurologic: " oriented to person only, follows some commands     Discharge Disposition  Skilled Nursing Facility (DC - External)  Discharge Medications   Neida Burk   Home Medication Instructions YAEL:[id number]    Printed on:04/05/17 1141   Medication Information                      aspirin 81 MG chewable tablet  Chew 81 mg Daily.             carvedilol (COREG) 6.25 MG tablet  Take 1 tablet by mouth Every 12 (Twelve) Hours. Hold for SBP<120             cyclobenzaprine (FLEXERIL) 5 MG tablet  Take 1 tablet by mouth 2 (Two) Times a Day As Needed for Muscle Spasms.             furosemide (LASIX) 40 MG tablet  Take 40 mg by mouth Daily.             ipratropium-albuterol (DUO-NEB) 0.5-2.5 mg/mL nebulizer  Take 3 mL by nebulization Every 6 (Six) Hours As Needed for Shortness of Air.             levothyroxine (SYNTHROID, LEVOTHROID) 100 MCG tablet  Take 1 tablet by mouth Daily.             Morphine 20 MG/ML concentrated solution  Take 0.5 mL by mouth Every 3 (Three) Hours As Needed for Severe Pain (7-10) for up to 3 days.             ondansetron (ZOFRAN) 4 MG tablet  Take 1 tablet by mouth Every 6 (Six) Hours As Needed for Nausea or Vomiting.             oxyCODONE-acetaminophen (PERCOCET) 5-325 MG per tablet  Take 1 tablet by mouth Every 4 (Four) Hours As Needed for Moderate Pain (4-6) for up to 3 days.             pantoprazole (PROTONIX) 40 MG EC tablet  Take 40 mg by mouth Daily.             polyethylene glycol (MIRALAX) packet  Take 17 g by mouth Every Other Day.             saccharomyces boulardii (FLORASTOR) 250 MG capsule  Take 1 capsule by mouth 2 (Two) Times a Day.             sacubitril-valsartan (ENTRESTO) 24-26 MG tablet  Take 1 tablet by mouth Every 12 (Twelve) Hours. Hold for sbp <120             spironolactone (ALDACTONE) 25 MG tablet  Take 1 tablet by mouth Daily.             vancomycin 50 MG/ML solution oral solution  5 mL by Nasogastric route Every 6 (Six) Hours for 3 days. Indications: Clostridium Difficile  Infection             venlafaxine (EFFEXOR) 37.5 MG tablet  Take 1 tablet by mouth Every 12 (Twelve) Hours.                 Discharge Diet:   Diet Instructions     Diet: Dysphagia; Thin Liquids, No Restrictions; Pureed With Some Mashed       Discharge Diet:  Dysphagia   Fluid Consistency:  Thin Liquids, No Restrictions   Pureed Options:  Pureed With Some Mashed   Comfort diet, meds crushed in applesauce/pudding                 Discharge Care Plan / Instructions:    Activity at Discharge:   Activity Instructions     Activity as Tolerated                     Follow-up Appointments  No future appointments.  Additional Instructions for the Follow-ups that You Need to Schedule     Discharge Follow-up with PCP    As directed    Follow Up Details:  to be followed up by provider at facility within 24hr of admit       Discharge Follow-up with Specialty    As directed    Specialty:  Hospice   Follow Up Details:  Patient to followup with Hospice upon transfer to [facility]                 Test Results Pending at Discharge       Casie M Mayne, PA 04/05/17 11:41 AM    Time: Discharge 45 min    Please note that portions of this note may have been completed with a voice recognition program. Efforts were made to edit the dictations, but occasionally words are mistranscribed.

## 2017-04-05 NOTE — PLAN OF CARE
Problem: Patient Care Overview (Adult)  Goal: Plan of Care Review  Outcome: Ongoing (interventions implemented as appropriate)    04/05/17 1030   Coping/Psychosocial Response Interventions   Plan Of Care Reviewed With patient;caregiver   Patient Care Overview   Progress no change   Outcome Evaluation   Outcome Summary/Follow up Plan Patient is going to the [place] [facility] today and will have [facility] for support for end of life care.

## 2017-04-06 NOTE — THERAPY DISCHARGE NOTE
Acute Care - Physical Therapy Discharge Summary  [facility]       Patient Name: Neida Burk  : 1933  MRN: [id number]    Today's Date: 2017  Onset of Illness/Injury or Date of Surgery Date: 17    Date of Referral to PT: 17  Referring Physician: MD Yohan      Admit Date: 3/20/2017      PT Recommendation and Plan    Visit Dx:    ICD-10-CM ICD-9-CM   1. Pharyngeal dysphagia R13.13 787.23   2. Impaired functional mobility, balance, gait, and endurance Z74.09 V49.89   3. Cardiomyopathy (R/O Takotsubo EF 20%) I51.81 429.83                       IP PT Goals       17 1158          Bed Mobility PT LTG    Bed Mobility PT LTG, Outcome goal ongoing  -LS      Transfer Training PT LTG    Transfer Training PT LTG, Outcome goal ongoing  -LS      Gait Training PT LTG    Gait Training Goal PT LTG, Outcome goal ongoing  -LS      Static Sitting Balance PT LTG    Static Sitting Balance PT LTG, Outcome goal ongoing  -LS        User Key  (r) = Recorded By, (t) = Taken By, (c) = Cosigned By    Initials Name Provider Type    LS Radha Hager, PT Physical Therapist            Goals Status: Treatment plan discontinued secondary to discharge from acute facility.    PT Discharge Summary  Reason for Discharge: Discharge from facility  Outcomes Achieved: Refer to plan of care for updates on goals achieved  Discharge Destination: Extended care facility - LTC (with hospice care)      Valerie Arreguin, PT   2017

## 2017-09-25 ENCOUNTER — DOCUMENTATION (OUTPATIENT)
Dept: OTHER | Facility: HOSPITAL | Age: 82
End: 2017-09-25

## 2017-09-25 NOTE — PROGRESS NOTES
Survivorship Care Plan    Unable to discuss SCP with patient. Patient is confused and is with Hospice at this time. [place]
